# Patient Record
Sex: FEMALE | Race: WHITE | Employment: FULL TIME | URBAN - NONMETROPOLITAN AREA
[De-identification: names, ages, dates, MRNs, and addresses within clinical notes are randomized per-mention and may not be internally consistent; named-entity substitution may affect disease eponyms.]

---

## 2017-11-06 ENCOUNTER — HOSPITAL ENCOUNTER (EMERGENCY)
Age: 22
Discharge: HOME | End: 2017-11-06

## 2017-11-06 VITALS
RESPIRATION RATE: 18 BRPM | DIASTOLIC BLOOD PRESSURE: 79 MMHG | BODY MASS INDEX: 22.68 KG/M2 | SYSTOLIC BLOOD PRESSURE: 122 MMHG | HEIGHT: 63 IN | OXYGEN SATURATION: 97 % | HEART RATE: 85 BPM | WEIGHT: 128 LBS | TEMPERATURE: 97.7 F

## 2017-11-06 DIAGNOSIS — N30.00 ACUTE CYSTITIS WITHOUT HEMATURIA: Primary | ICD-10-CM

## 2017-11-06 LAB
BACTERIA: ABNORMAL /HPF
BILIRUBIN URINE: NEGATIVE
BLOOD, URINE: ABNORMAL
CLARITY: ABNORMAL
COLOR: YELLOW
EPITHELIAL CELLS, UA: 4 /HPF (ref 0–5)
GLUCOSE URINE: NEGATIVE MG/DL
HCG(URINE) PREGNANCY TEST: NEGATIVE
HYALINE CASTS: 3 /HPF (ref 0–8)
KETONES, URINE: NEGATIVE MG/DL
LEUKOCYTE ESTERASE, URINE: ABNORMAL
NITRITE, URINE: NEGATIVE
PH UA: 7.5
PROTEIN UA: NEGATIVE MG/DL
RBC UA: 4 /HPF (ref 0–4)
SPECIFIC GRAVITY UA: 1.01
UROBILINOGEN, URINE: 0.2 E.U./DL
WBC UA: 49 /HPF (ref 0–5)

## 2017-11-06 PROCEDURE — 81001 URINALYSIS AUTO W/SCOPE: CPT

## 2017-11-06 PROCEDURE — 99284 EMERGENCY DEPT VISIT MOD MDM: CPT

## 2017-11-06 PROCEDURE — 99282 EMERGENCY DEPT VISIT SF MDM: CPT | Performed by: NURSE PRACTITIONER

## 2017-11-06 PROCEDURE — 87077 CULTURE AEROBIC IDENTIFY: CPT

## 2017-11-06 PROCEDURE — 81025 URINE PREGNANCY TEST: CPT

## 2017-11-06 PROCEDURE — 87086 URINE CULTURE/COLONY COUNT: CPT

## 2017-11-06 PROCEDURE — 87186 SC STD MICRODIL/AGAR DIL: CPT

## 2017-11-06 RX ORDER — CEPHALEXIN 500 MG/1
500 CAPSULE ORAL 2 TIMES DAILY
Qty: 20 CAPSULE | Refills: 0 | Status: SHIPPED | OUTPATIENT
Start: 2017-11-06 | End: 2017-11-16

## 2017-11-06 ASSESSMENT — ENCOUNTER SYMPTOMS
EYES NEGATIVE: 1
CHEST TIGHTNESS: 0
SHORTNESS OF BREATH: 0
NAUSEA: 0
VOMITING: 1
CONSTIPATION: 0
ABDOMINAL PAIN: 1
COUGH: 0
DIARRHEA: 0
BLOOD IN STOOL: 0

## 2017-11-06 ASSESSMENT — PAIN SCALES - GENERAL: PAINLEVEL_OUTOF10: 8

## 2017-11-07 NOTE — ED PROVIDER NOTES
[address] [facility] [place] EMERGENCY DEPT  eMERGENCY dEPARTMENT eNCOUnter      Pt Name: Jon Soulier  MRN: [id number]  [place] 1995  Date of evaluation: 11/6/2017  Provider: USAMA Fry    CHIEF COMPLAINT       Chief Complaint   Patient presents with    Abdominal Pain     to mid ab x 1 week worsening         HISTORY OF PRESENT ILLNESS  (Location/Symptom, Timing/Onset, Context/Setting, Quality, Duration, Modifying Factors, Severity.)   Jon Soulier is a 25 y.o. female who presents to the emergency department with complaints of lower abdominal pain. Onset 1 week ago with worsening of symptoms over the last 3 days. Patient reports that she started having abdominal pain 1 week ago and vomited one time yesterday. She further reports frequency, urgency and that she may be pregnant. She took a home pregnancy test today that was negative and would like to have a blood test. She denies fever, chills, cough, dysuria, vaginal discharge, odor, itching, or pain, diarrhea, or constipation. Her last menses was 1 week ago, but was not normal for her. HPI    Nursing Notes were reviewed and I agree. REVIEW OF SYSTEMS    (2-9 systems for level 4, 10 or more for level 5)     Review of Systems   Constitutional: Negative for chills, fatigue and fever. HENT: Negative. Eyes: Negative. Respiratory: Negative for cough, chest tightness and shortness of breath. Cardiovascular: Negative. Gastrointestinal: Positive for abdominal pain and vomiting. Negative for blood in stool, constipation, diarrhea and nausea. Abdominal pain below the umbilicus. Vomited one time yesterday. Genitourinary: Positive for frequency and urgency. Negative for decreased urine volume, difficulty urinating, dysuria, flank pain, genital sores, hematuria, menstrual problem, pelvic pain, vaginal bleeding, vaginal discharge and vaginal pain. Musculoskeletal: Negative. Skin: Negative. Neurological: Negative. Psychiatric/Behavioral: Negative. PAST MEDICAL HISTORY     Past Medical History:   Diagnosis Date    ADHD (attention deficit hyperactivity disorder)     Varicella          SURGICAL HISTORY       Past Surgical History:   Procedure Laterality Date    DENTAL SURGERY      patient reports oral surgery 3-4 years ago         CURRENT MEDICATIONS       Discharge Medication List as of 11/6/2017 10:26 PM          ALLERGIES     Review of patient's allergies indicates no known allergies. FAMILY HISTORY       Family History   Problem Relation Age of Onset    High Blood Pressure Maternal Grandmother     Diabetes Maternal Grandmother     COPD Maternal Grandmother     High Blood Pressure Maternal Grandfather     Diabetes Maternal Grandfather           SOCIAL HISTORY       Social History     Social History    Marital status: Single     Spouse name: N/A    Number of children: N/A    Years of education: N/A     Social History Main Topics    Smoking status: Never Smoker    Smokeless tobacco: Never Used    Alcohol use No    Drug use: No    Sexual activity: Yes     Partners: Male     Other Topics Concern    None     Social History Narrative    None       SCREENINGS           PHYSICAL EXAM    (up to 7 for level 4, 8 or more for level 5)     ED Triage Vitals [11/06/17 2054]   BP Temp Temp Source Pulse Resp SpO2 Height Weight   126/71 97.7 °F (36.5 °C) Temporal 98 18 99 % 5' 3\" (1.6 m) 128 lb (58.1 kg)       Physical Exam   Constitutional: She is oriented to person, place, and time. She appears well-developed and well-nourished. HENT:   Head: Normocephalic and atraumatic. Right Ear: External ear normal.   Left Ear: External ear normal.   Eyes: Conjunctivae and EOM are normal. Pupils are equal, round, and reactive to light. Neck: Normal range of motion. Cardiovascular: Normal rate, regular rhythm and normal heart sounds. Pulmonary/Chest: Effort normal and breath sounds normal.   Abdominal: Soft.  Bowel sounds are normal. She exhibits no distension. There is no tenderness. There is no rebound. Musculoskeletal: Normal range of motion. Neurological: She is alert and oriented to person, place, and time. Skin: Skin is warm and dry. Psychiatric: She has a normal mood and affect. Vitals reviewed. DIAGNOSTIC RESULTS     RADIOLOGY:   Non-plain film images such as CT, Ultrasound and MRI are read by the radiologist. [facility] radiographic images are visualized and preliminarily interpreted by No att. providers found with the below findings:      Interpretation per the Radiologist below, if available at the time of this note:    No orders to display       LABS:  Labs Reviewed   URINALYSIS - Abnormal; Notable for the following:        Result Value    Clarity, UA CLOUDY (*)     Blood, Urine MODERATE (*)     Leukocyte Esterase, Urine MODERATE (*)     All other components within normal limits   MICROSCOPIC URINALYSIS - Abnormal; Notable for the following:     WBC, UA 49 (*)     All other components within normal limits   URINE CULTURE   PREGNANCY, URINE       All other labs were within normal range or not returned as of this dictation. RE-ASSESSMENT        EMERGENCY DEPARTMENT COURSE and DIFFERENTIAL DIAGNOSIS/MDM:   Vitals:    Vitals:    11/06/17 2127 11/06/17 2132 11/06/17 2201 11/06/17 2233   BP: 136/81 129/86 120/79 122/79   Pulse: 92 82 88 85   Resp: 17 18 18 18   Temp:    97.7 °F (36.5 °C)   TempSrc:    Oral   SpO2: 99% 99% 99% 97%   Weight:       Height:           MDM  Number of Diagnoses or Management Options  Acute cystitis without hematuria:   Diagnosis management comments: Diagnosis management comments:   25year old female presents to the emergency room with complaints of lower abdominal pain. Onset 1 week ago with worsening 3 days ago, frequency and urgency. Mother reports that the patient has a history of urinary tract infections when she was young.  The patient advises that she thinks she may be

## 2017-11-08 LAB
ORGANISM: ABNORMAL
URINE CULTURE, ROUTINE: ABNORMAL
URINE CULTURE, ROUTINE: ABNORMAL

## 2017-12-01 ENCOUNTER — HOSPITAL ENCOUNTER (EMERGENCY)
Age: 22
Discharge: HOME | End: 2017-12-01

## 2017-12-01 VITALS
RESPIRATION RATE: 20 BRPM | OXYGEN SATURATION: 99 % | DIASTOLIC BLOOD PRESSURE: 74 MMHG | HEART RATE: 112 BPM | TEMPERATURE: 99.2 F | SYSTOLIC BLOOD PRESSURE: 124 MMHG

## 2017-12-01 DIAGNOSIS — J02.9 ACUTE PHARYNGITIS, UNSPECIFIED ETIOLOGY: Primary | ICD-10-CM

## 2017-12-01 LAB
RAPID INFLUENZA  B AGN: NEGATIVE
RAPID INFLUENZA A AGN: NEGATIVE
S PYO AG THROAT QL: NEGATIVE

## 2017-12-01 PROCEDURE — 6360000002 HC RX W HCPCS: Performed by: PHYSICIAN ASSISTANT

## 2017-12-01 PROCEDURE — 99283 EMERGENCY DEPT VISIT LOW MDM: CPT | Performed by: PHYSICIAN ASSISTANT

## 2017-12-01 PROCEDURE — 6370000000 HC RX 637 (ALT 250 FOR IP): Performed by: PHYSICIAN ASSISTANT

## 2017-12-01 PROCEDURE — 87081 CULTURE SCREEN ONLY: CPT

## 2017-12-01 PROCEDURE — 87804 INFLUENZA ASSAY W/OPTIC: CPT

## 2017-12-01 PROCEDURE — 87880 STREP A ASSAY W/OPTIC: CPT

## 2017-12-01 PROCEDURE — 99283 EMERGENCY DEPT VISIT LOW MDM: CPT

## 2017-12-01 RX ORDER — ONDANSETRON 4 MG/1
4 TABLET, ORALLY DISINTEGRATING ORAL EVERY 8 HOURS PRN
Qty: 15 TABLET | Refills: 0 | Status: SHIPPED | OUTPATIENT
Start: 2017-12-01 | End: 2020-09-21

## 2017-12-01 RX ORDER — FLUTICASONE PROPIONATE 50 MCG
1 SPRAY, SUSPENSION (ML) NASAL DAILY
Qty: 1 BOTTLE | Refills: 0 | Status: SHIPPED | OUTPATIENT
Start: 2017-12-01 | End: 2020-09-21

## 2017-12-01 RX ORDER — ONDANSETRON 4 MG/1
4 TABLET, ORALLY DISINTEGRATING ORAL ONCE
Status: COMPLETED | OUTPATIENT
Start: 2017-12-01 | End: 2017-12-01

## 2017-12-01 RX ORDER — AMOXICILLIN 875 MG/1
875 TABLET, COATED ORAL 2 TIMES DAILY
Qty: 20 TABLET | Refills: 0 | Status: SHIPPED | OUTPATIENT
Start: 2017-12-01 | End: 2017-12-11

## 2017-12-01 RX ORDER — IBUPROFEN 200 MG
600 TABLET ORAL ONCE
Status: COMPLETED | OUTPATIENT
Start: 2017-12-01 | End: 2017-12-01

## 2017-12-01 RX ADMIN — IBUPROFEN 600 MG: 200 TABLET, FILM COATED ORAL at 12:40

## 2017-12-01 RX ADMIN — ONDANSETRON 4 MG: 4 TABLET, ORALLY DISINTEGRATING ORAL at 12:38

## 2017-12-01 ASSESSMENT — ENCOUNTER SYMPTOMS
RHINORRHEA: 1
SORE THROAT: 1
SINUS PAIN: 1

## 2017-12-01 ASSESSMENT — PAIN SCALES - GENERAL
PAINLEVEL_OUTOF10: 5
PAINLEVEL_OUTOF10: 5

## 2017-12-01 NOTE — ED PROVIDER NOTES
Social History    Marital status: Single     Spouse name: N/A    Number of children: N/A    Years of education: N/A     Social History Main Topics    Smoking status: Never Smoker    Smokeless tobacco: Never Used    Alcohol use No    Drug use: No    Sexual activity: Yes     Partners: Male     Other Topics Concern    None     Social History Narrative    None       SCREENINGS           PHYSICAL EXAM    (up to 7 for level 4, 8 or more for level 5)     ED Triage Vitals   BP Temp Temp Source Pulse Resp SpO2 Height Weight   12/01/17 1220 12/01/17 1220 12/01/17 1337 12/01/17 1220 12/01/17 1220 12/01/17 1220 -- --   123/75 101 °F (38.3 °C) Oral 127 20 100 %         Physical Exam   Constitutional: She is oriented to person, place, and time. She appears well-developed and well-nourished. No distress. HENT:   Head: Normocephalic and atraumatic. Right Ear: Hearing, tympanic membrane, external ear and ear canal normal.   Left Ear: Hearing, tympanic membrane, external ear and ear canal normal.   Nose: Mucosal edema and rhinorrhea present. Mouth/Throat: Uvula is midline and mucous membranes are normal. No uvula swelling. Oropharyngeal exudate, posterior oropharyngeal edema and posterior oropharyngeal erythema present. No tonsillar abscesses. Cardiovascular: Normal rate, regular rhythm and normal heart sounds. Exam reveals no gallop and no friction rub. No murmur heard. Pulmonary/Chest: Effort normal and breath sounds normal. No respiratory distress. She has no wheezes. She has no rales. Abdominal: Soft. Bowel sounds are normal. She exhibits no distension. There is no tenderness. There is no rebound and no guarding. Neurological: She is alert and oriented to person, place, and time. Skin: Skin is warm and dry. No rash noted. She is not diaphoretic. Psychiatric: She has a normal mood and affect. Nursing note and vitals reviewed.         DIAGNOSTIC RESULTS     RADIOLOGY:   Non-plain film

## 2017-12-01 NOTE — LETTER
[facility] EMERGENCY DEPT  [address]  Phone: [phone]               December 1, 2017    Patient: Shawn Schwarz   YOB: 1995   Date of Visit: 12/1/2017       To Whom It May Concern:    Shawn Schwarz was seen and treated in our emergency department on 12/1/2017. She may return to work Monday 12/4/18.       Sincerely,       ED Provider        Signature:__________________________________

## 2017-12-03 LAB — S PYO THROAT QL CULT: NORMAL

## 2018-03-19 ENCOUNTER — HOSPITAL ENCOUNTER (EMERGENCY)
Facility: HOSPITAL | Age: 23
Discharge: HOME | End: 2018-03-19
Admitting: EMERGENCY MEDICINE

## 2018-03-19 VITALS
WEIGHT: 130 LBS | TEMPERATURE: 97.8 F | SYSTOLIC BLOOD PRESSURE: 113 MMHG | HEIGHT: 63 IN | RESPIRATION RATE: 17 BRPM | OXYGEN SATURATION: 100 % | BODY MASS INDEX: 23.04 KG/M2 | DIASTOLIC BLOOD PRESSURE: 83 MMHG | HEART RATE: 107 BPM

## 2018-03-19 DIAGNOSIS — R11.2 NAUSEA AND VOMITING, INTRACTABILITY OF VOMITING NOT SPECIFIED, UNSPECIFIED VOMITING TYPE: ICD-10-CM

## 2018-03-19 DIAGNOSIS — N39.0 ACUTE UTI: Primary | ICD-10-CM

## 2018-03-19 DIAGNOSIS — J02.0 STREP PHARYNGITIS: ICD-10-CM

## 2018-03-19 LAB
ALBUMIN SERPL-MCNC: 4.6 G/DL (ref 3.5–5)
ALBUMIN/GLOB SERPL: 1.2 G/DL (ref 1.1–2.5)
ALP SERPL-CCNC: 92 U/L (ref 24–120)
ALT SERPL W P-5'-P-CCNC: 22 U/L (ref 0–54)
AMYLASE SERPL-CCNC: 74 U/L (ref 30–110)
ANION GAP SERPL CALCULATED.3IONS-SCNC: 15 MMOL/L (ref 4–13)
AST SERPL-CCNC: 23 U/L (ref 7–45)
B-HCG UR QL: POSITIVE
BACTERIA UR QL AUTO: ABNORMAL /HPF
BASOPHILS # BLD AUTO: 0.08 10*3/MM3 (ref 0–0.2)
BASOPHILS NFR BLD AUTO: 0.3 % (ref 0–2)
BILIRUB SERPL-MCNC: 0.7 MG/DL (ref 0.1–1)
BILIRUB UR QL STRIP: ABNORMAL
BUN BLD-MCNC: 9 MG/DL (ref 5–21)
BUN/CREAT SERPL: 13.4 (ref 7–25)
CALCIUM SPEC-SCNC: 9.5 MG/DL (ref 8.4–10.4)
CHLORIDE SERPL-SCNC: 100 MMOL/L (ref 98–110)
CLARITY UR: ABNORMAL
CO2 SERPL-SCNC: 24 MMOL/L (ref 24–31)
COLOR UR: ABNORMAL
CREAT BLD-MCNC: 0.67 MG/DL (ref 0.5–1.4)
D-LACTATE SERPL-SCNC: 0.7 MMOL/L (ref 0.5–2)
DEPRECATED RDW RBC AUTO: 41.1 FL (ref 40–54)
EOSINOPHIL # BLD AUTO: 0.01 10*3/MM3 (ref 0–0.7)
EOSINOPHIL NFR BLD AUTO: 0 % (ref 0–4)
ERYTHROCYTE [DISTWIDTH] IN BLOOD BY AUTOMATED COUNT: 12.7 % (ref 12–15)
FLUAV AG NPH QL: NEGATIVE
FLUBV AG NPH QL IA: NEGATIVE
GFR SERPL CREATININE-BSD FRML MDRD: 111 ML/MIN/1.73
GLOBULIN UR ELPH-MCNC: 4 GM/DL
GLUCOSE BLD-MCNC: 96 MG/DL (ref 70–100)
GLUCOSE UR STRIP-MCNC: NEGATIVE MG/DL
HCG INTACT+B SERPL-ACNC: 9.8 MIU/ML (ref 0–5)
HCT VFR BLD AUTO: 42.1 % (ref 37–47)
HETEROPH AB SER QL LA: NEGATIVE
HGB BLD-MCNC: 14.2 G/DL (ref 12–16)
HGB UR QL STRIP.AUTO: ABNORMAL
HOLD SPECIMEN: NORMAL
HOLD SPECIMEN: NORMAL
HYALINE CASTS UR QL AUTO: ABNORMAL /LPF
IMM GRANULOCYTES # BLD: 0.11 10*3/MM3 (ref 0–0.03)
IMM GRANULOCYTES NFR BLD: 0.5 % (ref 0–5)
KETONES UR QL STRIP: ABNORMAL
LEUKOCYTE ESTERASE UR QL STRIP.AUTO: ABNORMAL
LIPASE SERPL-CCNC: 29 U/L (ref 23–203)
LYMPHOCYTES # BLD AUTO: 1.27 10*3/MM3 (ref 0.72–4.86)
LYMPHOCYTES NFR BLD AUTO: 5.3 % (ref 15–45)
MCH RBC QN AUTO: 29.8 PG (ref 28–32)
MCHC RBC AUTO-ENTMCNC: 33.7 G/DL (ref 33–36)
MCV RBC AUTO: 88.3 FL (ref 82–98)
MONOCYTES # BLD AUTO: 1.61 10*3/MM3 (ref 0.19–1.3)
MONOCYTES NFR BLD AUTO: 6.7 % (ref 4–12)
MUCOUS THREADS URNS QL MICRO: ABNORMAL /HPF
NEUTROPHILS # BLD AUTO: 20.81 10*3/MM3 (ref 1.87–8.4)
NEUTROPHILS NFR BLD AUTO: 87.2 % (ref 39–78)
NITRITE UR QL STRIP: NEGATIVE
NRBC BLD MANUAL-RTO: 0 /100 WBC (ref 0–0)
PH UR STRIP.AUTO: 6 [PH] (ref 5–8)
PLATELET # BLD AUTO: 301 10*3/MM3 (ref 130–400)
PMV BLD AUTO: 10.9 FL (ref 6–12)
POTASSIUM BLD-SCNC: 3.8 MMOL/L (ref 3.5–5.3)
PROT SERPL-MCNC: 8.6 G/DL (ref 6.3–8.7)
PROT UR QL STRIP: ABNORMAL
RBC # BLD AUTO: 4.77 10*6/MM3 (ref 4.2–5.4)
RBC # UR: ABNORMAL /HPF
REF LAB TEST METHOD: ABNORMAL
S PYO AG THROAT QL: POSITIVE
SODIUM BLD-SCNC: 139 MMOL/L (ref 135–145)
SP GR UR STRIP: >1.03 (ref 1–1.03)
SQUAMOUS #/AREA URNS HPF: ABNORMAL /HPF
UROBILINOGEN UR QL STRIP: ABNORMAL
WBC NRBC COR # BLD: 23.89 10*3/MM3 (ref 4.8–10.8)
WBC UR QL AUTO: ABNORMAL /HPF
WHOLE BLOOD HOLD SPECIMEN: NORMAL
WHOLE BLOOD HOLD SPECIMEN: NORMAL

## 2018-03-19 PROCEDURE — 87804 INFLUENZA ASSAY W/OPTIC: CPT | Performed by: NURSE PRACTITIONER

## 2018-03-19 PROCEDURE — 86308 HETEROPHILE ANTIBODY SCREEN: CPT | Performed by: NURSE PRACTITIONER

## 2018-03-19 PROCEDURE — 87880 STREP A ASSAY W/OPTIC: CPT | Performed by: NURSE PRACTITIONER

## 2018-03-19 PROCEDURE — 84702 CHORIONIC GONADOTROPIN TEST: CPT | Performed by: NURSE PRACTITIONER

## 2018-03-19 PROCEDURE — 85025 COMPLETE CBC W/AUTO DIFF WBC: CPT | Performed by: NURSE PRACTITIONER

## 2018-03-19 PROCEDURE — 80053 COMPREHEN METABOLIC PANEL: CPT | Performed by: NURSE PRACTITIONER

## 2018-03-19 PROCEDURE — 96374 THER/PROPH/DIAG INJ IV PUSH: CPT

## 2018-03-19 PROCEDURE — 87077 CULTURE AEROBIC IDENTIFY: CPT | Performed by: NURSE PRACTITIONER

## 2018-03-19 PROCEDURE — 96361 HYDRATE IV INFUSION ADD-ON: CPT

## 2018-03-19 PROCEDURE — 83605 ASSAY OF LACTIC ACID: CPT | Performed by: NURSE PRACTITIONER

## 2018-03-19 PROCEDURE — 81001 URINALYSIS AUTO W/SCOPE: CPT | Performed by: NURSE PRACTITIONER

## 2018-03-19 PROCEDURE — 82150 ASSAY OF AMYLASE: CPT | Performed by: NURSE PRACTITIONER

## 2018-03-19 PROCEDURE — 96375 TX/PRO/DX INJ NEW DRUG ADDON: CPT

## 2018-03-19 PROCEDURE — 81025 URINE PREGNANCY TEST: CPT | Performed by: NURSE PRACTITIONER

## 2018-03-19 PROCEDURE — 87186 SC STD MICRODIL/AGAR DIL: CPT | Performed by: NURSE PRACTITIONER

## 2018-03-19 PROCEDURE — 83690 ASSAY OF LIPASE: CPT | Performed by: NURSE PRACTITIONER

## 2018-03-19 PROCEDURE — 25010000002 ONDANSETRON PER 1 MG: Performed by: NURSE PRACTITIONER

## 2018-03-19 PROCEDURE — 25010000002 CEFTRIAXONE PER 250 MG: Performed by: NURSE PRACTITIONER

## 2018-03-19 PROCEDURE — 99284 EMERGENCY DEPT VISIT MOD MDM: CPT

## 2018-03-19 PROCEDURE — 87086 URINE CULTURE/COLONY COUNT: CPT | Performed by: NURSE PRACTITIONER

## 2018-03-19 RX ORDER — CEFDINIR 300 MG/1
300 CAPSULE ORAL 2 TIMES DAILY
Qty: 20 CAPSULE | Refills: 0 | Status: SHIPPED | OUTPATIENT
Start: 2018-03-19 | End: 2018-03-29

## 2018-03-19 RX ORDER — ONDANSETRON 2 MG/ML
4 INJECTION INTRAMUSCULAR; INTRAVENOUS ONCE
Status: COMPLETED | OUTPATIENT
Start: 2018-03-19 | End: 2018-03-19

## 2018-03-19 RX ORDER — ONDANSETRON 4 MG/1
4 TABLET, ORALLY DISINTEGRATING ORAL EVERY 6 HOURS PRN
Qty: 10 TABLET | Refills: 0 | Status: SHIPPED | OUTPATIENT
Start: 2018-03-19 | End: 2018-11-30 | Stop reason: HOSPADM

## 2018-03-19 RX ADMIN — CEFTRIAXONE SODIUM 1 G: 1 INJECTION, POWDER, FOR SOLUTION INTRAMUSCULAR; INTRAVENOUS at 14:55

## 2018-03-19 RX ADMIN — ONDANSETRON HYDROCHLORIDE 4 MG: 2 INJECTION INTRAMUSCULAR; INTRAVENOUS at 14:55

## 2018-03-19 RX ADMIN — SODIUM CHLORIDE 1000 ML: 9 INJECTION, SOLUTION INTRAVENOUS at 16:43

## 2018-03-19 RX ADMIN — SODIUM CHLORIDE 1000 ML: 9 INJECTION, SOLUTION INTRAVENOUS at 14:51

## 2018-03-19 NOTE — ED NOTES
Patient reports that last night she started experiencing generalized body aches with shortness of breath. Patient also reports pain to midsternum chest with deep inspiration.      Dominguze Koenig RN  03/19/18 3447

## 2018-03-19 NOTE — ED PROVIDER NOTES
"Subjective   Patient is a 22-year-old white female presents with nausea with vomiting, cough, sore throat that started last night.  She states that she became very dizzy and weak today and had a near syncopal episode after vomiting.  She states that she has had chills with subjective fever as well.  He states last time she vomited was just a few hours ago.  She denies any abdominal pain.        History provided by:  Patient   used: No        Review of Systems   Constitutional: Negative.    HENT:        Patient is a 22-year-old white female presents with nausea with vomiting, cough, sore throat that started last night.  She states that she became very dizzy and weak today and had a near syncopal episode after vomiting.  She states that she has had chills with subjective fever as well.  He states last time she vomited was just a few hours ago.  She denies any abdominal pain.   Eyes: Negative.    Respiratory: Negative.    Cardiovascular: Negative.    Gastrointestinal: Negative.    Endocrine: Negative.    Genitourinary: Negative.    Musculoskeletal: Negative.    Skin: Negative.    Allergic/Immunologic: Negative.    Neurological: Negative.    Hematological: Negative.    Psychiatric/Behavioral: Negative.    All other systems reviewed and are negative.      History reviewed. No pertinent past medical history.    No Known Allergies    Past Surgical History:   Procedure Laterality Date   • DENTAL PROCEDURE         History reviewed. No pertinent family history.    Social History     Social History   • Marital status: Single     Social History Main Topics   • Smoking status: Never Smoker   • Smokeless tobacco: Never Used   • Alcohol use No   • Drug use: No     Other Topics Concern   • Not on file       Prior to Admission medications    Not on File       /83   Pulse 107   Temp 97.8 °F (36.6 °C) (Temporal Artery )   Resp 17   Ht 160 cm (63\")   Wt 59 kg (130 lb)   SpO2 100%   BMI 23.03 kg/m² "     Objective   Physical Exam   Constitutional: She is oriented to person, place, and time. She appears well-developed and well-nourished.   HENT:   Head: Normocephalic and atraumatic.   Moderate tonsillar enlargement noted with eryth. Mild exudate noted. No signs of peritonsillar abscess noted. Airway intact.    Eyes: Conjunctivae and EOM are normal. Pupils are equal, round, and reactive to light.   Neck: Normal range of motion. Neck supple. No tracheal deviation present. No thyromegaly present.   bilat anterior cervical adenopathy noted.    Cardiovascular: Normal rate, regular rhythm, normal heart sounds and intact distal pulses.    Pulmonary/Chest: Effort normal and breath sounds normal. No respiratory distress. She has no wheezes. She has no rales. She exhibits no tenderness.   Abdominal: Soft. Bowel sounds are normal.   Musculoskeletal: Normal range of motion.   Neurological: She is alert and oriented to person, place, and time. She has normal reflexes. No cranial nerve deficit.   Skin: Skin is warm and dry.   Psychiatric: She has a normal mood and affect. Her behavior is normal. Judgment and thought content normal.   Nursing note and vitals reviewed.      Procedures         Lab Results (last 24 hours)     Procedure Component Value Units Date/Time    CBC & Differential [[id number]] Collected:  03/19/18 1450    Specimen:  Blood Updated:  03/19/18 1512    Narrative:       The following orders were created for panel order CBC & Differential.  Procedure                               Abnormality         Status                     ---------                               -----------         ------                     CBC Auto Differential[[id number]]         Abnormal            Final result                 Please view results for these tests on the individual orders.    Comprehensive Metabolic Panel [[id number]]  (Abnormal) Collected:  03/19/18 1450    Specimen:  Blood Updated:  03/19/18 1510     Glucose 96 mg/dL      BUN 9  mg/dL      Creatinine 0.67 mg/dL      Sodium 139 mmol/L      Potassium 3.8 mmol/L      Chloride 100 mmol/L      CO2 24.0 mmol/L      Calcium 9.5 mg/dL      Total Protein 8.6 g/dL      Albumin 4.60 g/dL      ALT (SGPT) 22 U/L      AST (SGOT) 23 U/L      Alkaline Phosphatase 92 U/L      Total Bilirubin 0.7 mg/dL      eGFR Non African Amer 111 mL/min/1.73      Globulin 4.0 gm/dL      A/G Ratio 1.2 g/dL      BUN/Creatinine Ratio 13.4     Anion Gap 15.0 (H) mmol/L     Lipase [[id number]]  (Normal) Collected:  03/19/18 1450    Specimen:  Blood Updated:  03/19/18 1510     Lipase 29 U/L     Amylase [[id number]]  (Normal) Collected:  03/19/18 1450    Specimen:  Blood Updated:  03/19/18 1510     Amylase 74 U/L     Pregnancy, Urine - Urine, Clean Catch [[id number]]  (Abnormal) Collected:  03/19/18 1450    Specimen:  Urine from Urine, Clean Catch Updated:  03/19/18 1523     HCG, Urine QL Positive (A)     Comment: Corrected result. Previous result was Negative on 3/19/2018 at 1507 CDT.       Urinalysis With / Culture If Indicated - Urine, Clean Catch [[id number]]  (Abnormal) Collected:  03/19/18 1450    Specimen:  Urine from Urine, Clean Catch Updated:  03/19/18 1526     Color, UA Dark Yellow (A)     Appearance, UA Cloudy (A)     pH, UA 6.0     Specific Gravity, UA >1.030 (H)     Glucose, UA Negative     Ketones, UA 15 mg/dL (1+) (A)     Bilirubin, UA Small (1+) (A)     Blood, UA Trace (A)     Protein, UA 30 mg/dL (1+) (A)     Leuk Esterase, UA Moderate (2+) (A)     Nitrite, UA Negative     Urobilinogen, UA 2.0 E.U./dL (A)    Rapid Strep A Screen - Swab, Throat [[id number]]  (Abnormal) Collected:  03/19/18 1450    Specimen:  Swab from Throat Updated:  03/19/18 1527     Strep A Ag Positive (A)    Mononucleosis Screen [[id number]]  (Normal) Collected:  03/19/18 1450    Specimen:  Blood Updated:  03/19/18 1509     Monospot Negative    Influenza Antigen, Rapid - Swab, Nasopharynx [[id number]]  (Normal) Collected:  03/19/18 1450    Specimen:  Swab  from Nasopharynx Updated:  03/19/18 1526     Influenza A Ag, EIA Negative     Influenza B Ag, EIA Negative    Narrative:         Recommend confirmation of negative results by viral culture or molecular assay.    CBC Auto Differential [[id number]]  (Abnormal) Collected:  03/19/18 1450    Specimen:  Blood Updated:  03/19/18 1512     WBC 23.89 (H) 10*3/mm3      RBC 4.77 10*6/mm3      Hemoglobin 14.2 g/dL      Hematocrit 42.1 %      MCV 88.3 fL      MCH 29.8 pg      MCHC 33.7 g/dL      RDW 12.7 %      RDW-SD 41.1 fl      MPV 10.9 fL      Platelets 301 10*3/mm3      Neutrophil % 87.2 (H) %      Lymphocyte % 5.3 (L) %      Monocyte % 6.7 %      Eosinophil % 0.0 %      Basophil % 0.3 %      Immature Grans % 0.5 %      Neutrophils, Absolute 20.81 (H) 10*3/mm3      Lymphocytes, Absolute 1.27 10*3/mm3      Monocytes, Absolute 1.61 (H) 10*3/mm3      Eosinophils, Absolute 0.01 10*3/mm3      Basophils, Absolute 0.08 10*3/mm3      Immature Grans, Absolute 0.11 (H) 10*3/mm3      nRBC 0.0 /100 WBC     Urine Culture - Urine, Urine, Clean Catch [[id number]]  (Abnormal) Collected:  03/19/18 1450    Specimen:  Urine from Urine, Clean Catch Updated:  03/20/18 0929     Urine Culture --      >100,000 CFU/mL Gram Negative Bacilli (A)    Urinalysis, Microscopic Only - Urine, Clean Catch [[id number]]  (Abnormal) Collected:  03/19/18 1450    Specimen:  Urine from Urine, Clean Catch Updated:  03/19/18 1526     RBC, UA 3-5 (A) /HPF      WBC, UA 31-50 (A) /HPF      Bacteria, UA 1+ (A) /HPF      Squamous Epithelial Cells, UA 7-12 (A) /HPF      Hyaline Casts, UA None Seen /LPF      Mucus, UA Moderate/2+ (A) /HPF      Methodology Manual Light Microscopy    hCG, Quantitative, Pregnancy [[id number]]  (Abnormal) Collected:  03/19/18 1450    Specimen:  Blood Updated:  03/19/18 1553     HCG Quantitative 9.80 (H) mIU/mL     Lactic Acid, Plasma [[id number]]  (Normal) Collected:  03/19/18 1640    Specimen:  Blood from Arm, Left Updated:  03/19/18 1703     Lactate  0.7 mmol/L           No orders to display       ED Course  ED Course   Comment By Time   Pending lactic acid and infusion of 2nd liter of ivf. Pt states that she is feeling much better at this time. No further vomiting since has been in the er.  Cristal Dickerson, APRN 03/19 1651   Reeval pt - states that she is feeling much better. Reviewed pt and pt care plan with dr nuñez- also assessed pt and in agreement with pt care plan - pt will be discharged home shortly in stable condition to follow up with pcp this week. Advised also she needs to follow up with ob for further. Will be discharged home in stable condition  Cristal Dickerson, APRN 03/19 2646          MDM  Number of Diagnoses or Management Options  Acute UTI: minor  Nausea and vomiting, intractability of vomiting not specified, unspecified vomiting type: minor  Strep pharyngitis: minor  Patient Progress  Patient progress: stable      Final diagnoses:   Acute UTI   Strep pharyngitis   Nausea and vomiting, intractability of vomiting not specified, unspecified vomiting type          Cristal Dickerson, KIANA  03/20/18 1244

## 2018-03-22 LAB
BACTERIA SPEC AEROBE CULT: ABNORMAL
BACTERIA SPEC AEROBE CULT: ABNORMAL

## 2018-03-29 LAB
EXTERNAL HEPATITIS B SURFACE ANTIGEN: NEGATIVE
EXTERNAL HERPES CULTURE: NORMAL
EXTERNAL SYPHILIS RPR SCREEN: NEGATIVE
HIV1 P24 AG SERPL QL IA: NORMAL

## 2018-03-31 LAB — EXTERNAL RUBELLA QUALITATIVE: NORMAL

## 2018-05-17 LAB
EXTERNAL CHLAMYDIA SCREEN: NEGATIVE
EXTERNAL GONORRHEA SCREEN: NEGATIVE

## 2018-09-27 ENCOUNTER — APPOINTMENT (OUTPATIENT)
Dept: LAB | Facility: HOSPITAL | Age: 23
End: 2018-09-27
Attending: OBSTETRICS & GYNECOLOGY

## 2018-09-27 ENCOUNTER — TRANSCRIBE ORDERS (OUTPATIENT)
Dept: ADMINISTRATIVE | Facility: HOSPITAL | Age: 23
End: 2018-09-27

## 2018-09-27 DIAGNOSIS — O09.893 HISTORY OF MATERNAL HEMATOMA OF BROAD LIGAMENT, CURRENTLY PREGNANT, THIRD TRIMESTER: ICD-10-CM

## 2018-09-27 DIAGNOSIS — Z67.91 RHD NEGATIVE: Primary | ICD-10-CM

## 2018-09-27 LAB
ABO GROUP BLD: NORMAL
BLD GP AB SCN SERPL QL: NEGATIVE
NUMBER OF DOSES: NORMAL
RH BLD: NEGATIVE

## 2018-09-27 PROCEDURE — 36415 COLL VENOUS BLD VENIPUNCTURE: CPT | Performed by: OBSTETRICS & GYNECOLOGY

## 2018-09-27 PROCEDURE — 86900 BLOOD TYPING SEROLOGIC ABO: CPT | Performed by: OBSTETRICS & GYNECOLOGY

## 2018-09-27 PROCEDURE — 86850 RBC ANTIBODY SCREEN: CPT | Performed by: OBSTETRICS & GYNECOLOGY

## 2018-09-27 PROCEDURE — 86901 BLOOD TYPING SEROLOGIC RH(D): CPT | Performed by: OBSTETRICS & GYNECOLOGY

## 2018-09-28 ENCOUNTER — HOSPITAL ENCOUNTER (OUTPATIENT)
Facility: HOSPITAL | Age: 23
Discharge: HOME | End: 2018-09-28
Attending: OBSTETRICS & GYNECOLOGY | Admitting: OBSTETRICS & GYNECOLOGY

## 2018-09-28 PROBLEM — Z29.13 NEED FOR RHOGAM DUE TO RH NEGATIVE MOTHER: Status: ACTIVE | Noted: 2018-09-28

## 2018-09-28 PROCEDURE — 96372 THER/PROPH/DIAG INJ SC/IM: CPT

## 2018-09-28 PROCEDURE — 25010000003 RHO D IMMUNE GLOBULIN 1500 UNITS SOLUTION PREFILLED SYRINGE: Performed by: OBSTETRICS & GYNECOLOGY

## 2018-09-28 RX ADMIN — HUMAN RHO(D) IMMUNE GLOBULIN 1500 UNITS: 300 INJECTION, SOLUTION INTRAMUSCULAR at 14:46

## 2018-10-08 NOTE — DISCHARGE INSTRUCTIONS
Antibiotic Medicine, Adult  Antibiotic medicines treat infections caused by a type of germ called bacteria. They work by killing the bacteria that make you sick.  When do I need to take antibiotics?  You often need these medicines to treat bacterial infections, such as:  · A urinary tract infection (UTI).  · Strep throat.  · Meningitis. This affects the spinal cord and brain.  · A bad lung infection.  You may start the medicines while your doctor waits for tests to come back. When the tests come back, your doctor may change or stop your medicine.  When are antibiotics not needed?  You do not need these medicines for most common illnesses, such as:  · A cold.  · The flu.  · A sore throat.  Antibiotics are not always needed for all infections caused by bacteria. Do not ask for these medicines, or take them, when they are not needed.  What are the risks of taking antibiotics?  Most antibiotics can cause an infection called Clostridium difficile.This causes watery poop (diarrhea). Let your doctor know right away if:  · You have watery poop while taking an antibiotic.  · You have watery poop after you stop taking an antibiotic. The illness can happen weeks after you stop the medicine.  You also have a risk of getting an infection in the future that antibiotics cannot treat (antibiotic-resistant infection). This type of infection can be dangerous.  What else should I know about taking antibiotics?  · You need to take the entire prescription.  ¨ Take the medicine for as long as told by your doctor.  ¨ Do not stop taking it even if you start to feel better.  · Try not to miss any doses. If you miss a dose, call your doctor.  · Birth control pills may not work. If you take birth control pills:  ¨ Keep on taking them.  ¨ Use a second form of birth control, such as a condom. Do this for as long as told by your doctor.  · Ask your doctor:  ¨ How long to wait in between doses.  ¨ If you should take the medicine with food.  ¨ If  there is anything you should stay away from while taking the antibiotic, such as:  ¨ Food.  ¨ Drinks.  ¨ Medicines.  ¨ If there are any side effects you should watch for.  · Only take the medicines that your doctor told you to take. Do not take medicines that were given to someone else.  · Drink a large glass of water with the medicine.  · Ask the pharmacist for a tool to measure the medicine, such as:  ¨ A syringe.  ¨ A cup.  ¨ A spoon.  · Throw away any extra medicine.  Contact a doctor if:  · You get worse.  · You have new joint pain or muscle aches after starting the medicine.  · You have side effects from the medicine, such as:  ¨ Stomach pain.  ¨ Watery poop.  ¨ Feeling sick to your stomach (nausea).  Get help right away if:  · You have signs of a very bad allergic reaction. If this happens, stop taking the medicine right away. Signs may include:  ¨ Hives. These are raised, itchy, red bumps on the skin.  ¨ Skin rash.  ¨ Trouble breathing.  ¨ Wheezing.  ¨ Swelling.  ¨ Feeling dizzy.  ¨ Throwing up (vomiting).  · Your pee (urine) is dark, or is the color of blood.  · Your skin turns yellow.  · You bruise easily.  · You bleed easily.  · You have very bad watery poop and cramps in your belly.  · You have a very bad headache.  Summary  · Antibiotics are often used to treat infections caused by bacteria.  · Only take these medicines when needed.  · Let your doctor know if you have watery poop while taking an antibiotic.  · You need to take the entire prescription.  This information is not intended to replace advice given to you by your health care provider. Make sure you discuss any questions you have with your health care provider.  Document Released: 09/26/2009 Document Revised: 12/20/2017 Document Reviewed: 12/20/2017  [employer] Interactive Patient Education © 2017 [employer]      Nausea and Vomiting, Adult  Feeling sick to your stomach (nausea) means that your stomach is upset or you feel like you have to  throw up (vomit). Feeling more and more sick to your stomach can lead to throwing up. Throwing up happens when food and liquid from your stomach are thrown up and out the mouth. Throwing up can make you feel weak and cause you to get dehydrated. Dehydration can make you tired and thirsty, make you have a dry mouth, and make it so you pee (urinate) less often. Older adults and people with other diseases or a weak defense system (immune system) are at higher risk for dehydration. If you feel sick to your stomach or if you throw up, it is important to follow instructions from your doctor about how to take care of yourself.  Follow these instructions at home:  Eating and drinking   Follow these instructions as told by your doctor:  · Take an oral rehydration solution (ORS). This is a drink that is sold at pharmacies and stores.  · Drink clear fluids in small amounts as you are able, such as:  ¨ Water.  ¨ Ice chips.  ¨ Diluted fruit juice.  ¨ Low-calorie sports drinks.  · Eat bland, easy-to-digest foods in small amounts as you are able, such as:  ¨ Bananas.  ¨ Applesauce.  ¨ Rice.  ¨ Low-fat (lean) meats.  ¨ Toast.  ¨ Crackers.  · Avoid fluids that have a lot of sugar or caffeine in them.  · Avoid alcohol.  · Avoid spicy or fatty foods.  General instructions   · Drink enough fluid to keep your pee (urine) clear or pale yellow.  · Wash your hands often. If you cannot use soap and water, use hand .  · Make sure that all people in your home wash their hands well and often.  · Take over-the-counter and prescription medicines only as told by your doctor.  · Rest at home while you get better.  · Watch your condition for any changes.  · Breathe slowly and deeply when you feel sick to your stomach.  · Keep all follow-up visits as told by your doctor. This is important.  Contact a doctor if:  · You have a fever.  · You cannot keep fluids down.  · Your symptoms get worse.  · You have new symptoms.  · You feel sick to your  Name band; stomach for more than two days.  · You feel light-headed or dizzy.  · You have a headache.  · You have muscle cramps.  Get help right away if:  · You have pain in your chest, neck, arm, or jaw.  · You feel very weak or you pass out (faint).  · You throw up again and again.  · You see blood in your throw-up.  · Your throw-up looks like black coffee grounds.  · You have bloody or black poop (stools) or poop that look like tar.  · You have a very bad headache, a stiff neck, or both.  · You have a rash.  · You have very bad pain, cramping, or bloating in your belly (abdomen).  · You have trouble breathing.  · You are breathing very quickly.  · Your heart is beating very quickly.  · Your skin feels cold and clammy.  · You feel confused.  · You have pain when you pee.  · You have signs of dehydration, such as:  ¨ Dark pee, hardly any pee, or no pee.  ¨ Cracked lips.  ¨ Dry mouth.  ¨ Sunken eyes.  ¨ Sleepiness.  ¨ Weakness.  These symptoms may be an emergency. Do not wait to see if the symptoms will go away. Get medical help right away. Call your local emergency services (911 in the [place] Do not drive yourself to the hospital.  This information is not intended to replace advice given to you by your health care provider. Make sure you discuss any questions you have with your health care provider.  Document Released: 06/05/2009 Document Revised: 07/07/2017 Document Reviewed: 08/23/2016  [employer] Interactive Patient Education © 2017 [employer]      Pregnancy and Urinary Tract Infection  What is a urinary tract infection?  A urinary tract infection (UTI) is an infection of any part of the urinary tract. This includes the kidneys, the tubes that connect your kidneys to your bladder (ureters), the bladder, and the tube that carries urine out of your body (urethra). These organs make, store, and get rid of urine in the body. A UTI can be a bladder infection (cystitis) or a kidney infection (pyelonephritis). This infection may  be caused by fungi, viruses, and bacteria. Bacteria are the most common cause of UTIs.  You are more likely to develop a UTI during pregnancy because:  · The physical and hormonal changes your body goes through can make it easier for bacteria to get into your urinary tract.  · Your growing baby puts pressure on your uterus and can affect urine flow.  Does a UTI place my baby at risk?  An untreated UTI during pregnancy could lead to a kidney infection, which can cause health problems that could affect your baby. Possible complications of an untreated UTI include:  · Having your baby before 37 weeks of pregnancy (premature).  · Having a baby with a low birth weight.  · Developing high blood pressure during pregnancy (preeclampsia).  What are the symptoms of a UTI?  Symptoms of a UTI include:  · Fever.  · Frequent urination or passing small amounts of urine frequently.  · Needing to urinate urgently.  · Pain or a burning sensation with urination.  · Urine that smells bad or unusual.  · Cloudy urine.  · Pain in the lower abdomen or back.  · Trouble urinating.  · Blood in the urine.  · Vomiting or being less hungry than normal.  · Diarrhea or abdominal pain.  · Vaginal discharge.  What are the treatment options for a UTI during pregnancy?  Treatment for this condition may include:  · Antibiotic medicines that are safe to take during pregnancy.  · Other medicines to treat less common causes of UTI.  How can I prevent a UTI?     To prevent a UTI:  · Go to the bathroom as soon as you feel the need.  · Always wipe from front to back.  · Wash your genital area with soap and warm water daily.  · Empty your bladder before and after sex.  · Wear cotton underwear.  · Limit your intake of high sugar foods or drinks, such as regular soda, juice, and sweets.  · Drink 6-8 glasses of water daily.  · Do not wear tight-fitting pants.  · Do not douche or use deodorant sprays.  · Do not drink alcohol, caffeine, or carbonated drinks. These  can irritate the bladder.  Contact a health care provider if:  · Your symptoms do not improve or get worse.  · You have a fever after two days of treatment.  · You have a rash.  · You have abnormal vaginal discharge.  · You have back or side pain.  · You have chills.  · You have nausea and vomiting.  Get help right away if:  Seek immediate medical care if you are pregnant and:  · You feel contractions in your uterus.  · You have lower belly pain.  · You have a gush of fluid from your vagina.  · You have blood in your urine.  · You are vomiting and cannot keep down any medicines or water.  This information is not intended to replace advice given to you by your health care provider. Make sure you discuss any questions you have with your health care provider.  Document Released: 04/13/2012 Document Revised: 12/01/2017 Document Reviewed: 11/07/2016  [employer] Interactive Patient Education © 2017 [employer]      Strep Throat  Strep throat is an infection of the throat. It is caused by germs. Strep throat spreads from person to person because of coughing, sneezing, or close contact.  Follow these instructions at home:  Medicines   · Take over-the-counter and prescription medicines only as told by your doctor.  · Take your antibiotic medicine as told by your doctor. Do not stop taking the medicine even if you feel better.  · Have family members who also have a sore throat or fever go to a doctor.  Eating and drinking   · Do not share food, drinking cups, or personal items.  · Try eating soft foods until your sore throat feels better.  · Drink enough fluid to keep your pee (urine) clear or pale yellow.  General instructions   · Rinse your mouth (gargle) with a salt-water mixture 3-4 times per day or as needed. To make a salt-water mixture, stir ½-1 tsp of salt into 1 cup of warm water.  · Make sure that all people in your house wash their hands well.  · Rest.  · Stay home from school or work until you have been taking  antibiotics for 24 hours.  · Keep all follow-up visits as told by your doctor. This is important.  Contact a doctor if:  · Your neck keeps getting bigger.  · You get a rash, cough, or earache.  · You cough up thick liquid that is green, yellow-brown, or bloody.  · You have pain that does not get better with medicine.  · Your problems get worse instead of getting better.  · You have a fever.  Get help right away if:  · You throw up (vomit).  · You get a very bad headache.  · You neck hurts or it feels stiff.  · You have chest pain or you are short of breath.  · You have drooling, very bad throat pain, or changes in your voice.  · Your neck is swollen or the skin gets red and tender.  · Your mouth is dry or you are peeing less than normal.  · You keep feeling more tired or it is hard to wake up.  · Your joints are red or they hurt.  This information is not intended to replace advice given to you by your health care provider. Make sure you discuss any questions you have with your health care provider.  Document Released: 06/05/2009 Document Revised: 08/16/2017 Document Reviewed: 04/11/2016  [employer] Interactive Patient Education © 2017 [employer]

## 2018-10-10 ENCOUNTER — HOSPITAL ENCOUNTER (INPATIENT)
Facility: HOSPITAL | Age: 23
LOS: 1 days | Discharge: HOME | End: 2018-10-11
Attending: EMERGENCY MEDICINE | Admitting: OBSTETRICS & GYNECOLOGY

## 2018-10-10 ENCOUNTER — APPOINTMENT (OUTPATIENT)
Dept: ULTRASOUND IMAGING | Facility: HOSPITAL | Age: 23
End: 2018-10-10
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
## 2018-10-10 DIAGNOSIS — R10.30 LOWER ABDOMINAL PAIN: ICD-10-CM

## 2018-10-10 DIAGNOSIS — Z3A.32 32 WEEKS GESTATION OF PREGNANCY: ICD-10-CM

## 2018-10-10 LAB
ABO GROUP BLD: NORMAL
BASOPHILS # BLD MANUAL: 0.16 10*3/MM3 (ref 0–0.2)
BASOPHILS NFR BLD AUTO: 1 % (ref 0–2)
BILIRUB UR QL STRIP: NEGATIVE
BLD GP AB SCN SERPL QL: POSITIVE
CLARITY UR: CLEAR
COLOR UR: YELLOW
DEPRECATED RDW RBC AUTO: 41.3 FL (ref 40–54)
ERYTHROCYTE [DISTWIDTH] IN BLOOD BY AUTOMATED COUNT: 12.5 % (ref 12–15)
FIBRONECTIN FETAL VAG QL: POSITIVE
GLUCOSE UR STRIP-MCNC: NEGATIVE MG/DL
HCT VFR BLD AUTO: 34.7 % (ref 37–47)
HGB BLD-MCNC: 11.8 G/DL (ref 12–16)
HGB UR QL STRIP.AUTO: NEGATIVE
KETONES UR QL STRIP: NEGATIVE
LEUKOCYTE ESTERASE UR QL STRIP.AUTO: NEGATIVE
LYMPHOCYTES # BLD MANUAL: 1.61 10*3/MM3 (ref 0.72–4.86)
LYMPHOCYTES NFR BLD MANUAL: 10.1 % (ref 15–45)
LYMPHOCYTES NFR BLD MANUAL: 6.1 % (ref 4–12)
MCH RBC QN AUTO: 31.2 PG (ref 28–32)
MCHC RBC AUTO-ENTMCNC: 34 G/DL (ref 33–36)
MCV RBC AUTO: 91.8 FL (ref 82–98)
MONOCYTES # BLD AUTO: 0.97 10*3/MM3 (ref 0.19–1.3)
NEUTROPHILS # BLD AUTO: 12.88 10*3/MM3 (ref 1.87–8.4)
NEUTROPHILS NFR BLD MANUAL: 80.8 % (ref 39–78)
NITRITE UR QL STRIP: NEGATIVE
PH UR STRIP.AUTO: 7 [PH] (ref 5–8)
PLAT MORPH BLD: NORMAL
PLATELET # BLD AUTO: 263 10*3/MM3 (ref 130–400)
PMV BLD AUTO: 10.5 FL (ref 6–12)
POLYCHROMASIA BLD QL SMEAR: ABNORMAL
PROT UR QL STRIP: NEGATIVE
RBC # BLD AUTO: 3.78 10*6/MM3 (ref 4.2–5.4)
RESIDUAL RHIG DETECTED: NORMAL
RH BLD: NEGATIVE
SP GR UR STRIP: 1.02 (ref 1–1.03)
T&S EXPIRATION DATE: NORMAL
UROBILINOGEN UR QL STRIP: NORMAL
VARIANT LYMPHS NFR BLD MANUAL: 2 % (ref 0–5)
WBC MORPH BLD: NORMAL
WBC NRBC COR # BLD: 15.94 10*3/MM3 (ref 4.8–10.8)

## 2018-10-10 PROCEDURE — 87086 URINE CULTURE/COLONY COUNT: CPT | Performed by: OBSTETRICS & GYNECOLOGY

## 2018-10-10 PROCEDURE — 82731 ASSAY OF FETAL FIBRONECTIN: CPT | Performed by: OBSTETRICS & GYNECOLOGY

## 2018-10-10 PROCEDURE — 86901 BLOOD TYPING SEROLOGIC RH(D): CPT | Performed by: OBSTETRICS & GYNECOLOGY

## 2018-10-10 PROCEDURE — 85007 BL SMEAR W/DIFF WBC COUNT: CPT | Performed by: OBSTETRICS & GYNECOLOGY

## 2018-10-10 PROCEDURE — 86900 BLOOD TYPING SEROLOGIC ABO: CPT | Performed by: OBSTETRICS & GYNECOLOGY

## 2018-10-10 PROCEDURE — 25010000002 PENICILLIN G POTASSIUM PER 600000 UNITS: Performed by: OBSTETRICS & GYNECOLOGY

## 2018-10-10 PROCEDURE — 99284 EMERGENCY DEPT VISIT MOD MDM: CPT

## 2018-10-10 PROCEDURE — 86870 RBC ANTIBODY IDENTIFICATION: CPT | Performed by: OBSTETRICS & GYNECOLOGY

## 2018-10-10 PROCEDURE — 76815 OB US LIMITED FETUS(S): CPT

## 2018-10-10 PROCEDURE — 86850 RBC ANTIBODY SCREEN: CPT | Performed by: OBSTETRICS & GYNECOLOGY

## 2018-10-10 PROCEDURE — 81003 URINALYSIS AUTO W/O SCOPE: CPT | Performed by: OBSTETRICS & GYNECOLOGY

## 2018-10-10 PROCEDURE — 0T9B70Z DRAINAGE OF BLADDER WITH DRAINAGE DEVICE, VIA NATURAL OR ARTIFICIAL OPENING: ICD-10-PCS | Performed by: OBSTETRICS & GYNECOLOGY

## 2018-10-10 PROCEDURE — 25010000002 BETAMETHASONE ACET & SOD PHOS PER 4 MG: Performed by: OBSTETRICS & GYNECOLOGY

## 2018-10-10 PROCEDURE — G0378 HOSPITAL OBSERVATION PER HR: HCPCS

## 2018-10-10 PROCEDURE — 85027 COMPLETE CBC AUTOMATED: CPT | Performed by: OBSTETRICS & GYNECOLOGY

## 2018-10-10 RX ORDER — PENICILLIN G POTASSIUM 5000000 [IU]/1
3 INJECTION, POWDER, FOR SOLUTION INTRAMUSCULAR; INTRAVENOUS EVERY 4 HOURS
Status: DISCONTINUED | OUTPATIENT
Start: 2018-10-10 | End: 2018-10-10 | Stop reason: SDUPTHER

## 2018-10-10 RX ORDER — MAGNESIUM SULFATE HEPTAHYDRATE 40 MG/ML
4 INJECTION, SOLUTION INTRAVENOUS ONCE
Status: DISCONTINUED | OUTPATIENT
Start: 2018-10-10 | End: 2018-10-10

## 2018-10-10 RX ORDER — FERROUS SULFATE 325(65) MG
325 TABLET ORAL
COMMUNITY
End: 2020-07-31

## 2018-10-10 RX ORDER — BETAMETHASONE SODIUM PHOSPHATE AND BETAMETHASONE ACETATE 3; 3 MG/ML; MG/ML
12 INJECTION, SUSPENSION INTRA-ARTICULAR; INTRALESIONAL; INTRAMUSCULAR; SOFT TISSUE EVERY 24 HOURS
Status: COMPLETED | OUTPATIENT
Start: 2018-10-10 | End: 2018-10-11

## 2018-10-10 RX ORDER — SODIUM CHLORIDE 0.9 % (FLUSH) 0.9 %
5 SYRINGE (ML) INJECTION AS NEEDED
Status: DISCONTINUED | OUTPATIENT
Start: 2018-10-10 | End: 2018-10-11 | Stop reason: HOSPADM

## 2018-10-10 RX ORDER — ACETAMINOPHEN 325 MG/1
650 TABLET ORAL EVERY 6 HOURS PRN
Status: DISCONTINUED | OUTPATIENT
Start: 2018-10-10 | End: 2018-10-11 | Stop reason: HOSPADM

## 2018-10-10 RX ORDER — PRENATAL VIT NO.126/IRON/FOLIC 28MG-0.8MG
TABLET ORAL DAILY
COMMUNITY
End: 2020-07-31

## 2018-10-10 RX ORDER — PENICILLIN G 3000000 [IU]/50ML
3 INJECTION, SOLUTION INTRAVENOUS EVERY 4 HOURS
Status: DISCONTINUED | OUTPATIENT
Start: 2018-10-10 | End: 2018-10-11 | Stop reason: HOSPADM

## 2018-10-10 RX ORDER — SODIUM CHLORIDE 0.9 % (FLUSH) 0.9 %
3 SYRINGE (ML) INJECTION EVERY 12 HOURS SCHEDULED
Status: DISCONTINUED | OUTPATIENT
Start: 2018-10-10 | End: 2018-10-11 | Stop reason: HOSPADM

## 2018-10-10 RX ORDER — MAGNESIUM SULFATE HEPTAHYDRATE 40 MG/ML
4 INJECTION, SOLUTION INTRAVENOUS ONCE
Status: COMPLETED | OUTPATIENT
Start: 2018-10-10 | End: 2018-10-11

## 2018-10-10 RX ORDER — SODIUM CHLORIDE, SODIUM LACTATE, POTASSIUM CHLORIDE, CALCIUM CHLORIDE 600; 310; 30; 20 MG/100ML; MG/100ML; MG/100ML; MG/100ML
75 INJECTION, SOLUTION INTRAVENOUS CONTINUOUS
Status: DISCONTINUED | OUTPATIENT
Start: 2018-10-10 | End: 2018-10-11

## 2018-10-10 RX ORDER — MAGNESIUM SULFATE HEPTAHYDRATE 40 MG/ML
2 INJECTION, SOLUTION INTRAVENOUS CONTINUOUS
Status: DISPENSED | OUTPATIENT
Start: 2018-10-10 | End: 2018-10-11

## 2018-10-10 RX ORDER — CALCIUM GLUCONATE 94 MG/ML
1 INJECTION, SOLUTION INTRAVENOUS ONCE AS NEEDED
Status: DISCONTINUED | OUTPATIENT
Start: 2018-10-10 | End: 2018-10-11 | Stop reason: HOSPADM

## 2018-10-10 RX ORDER — NITROFURANTOIN 25; 75 MG/1; MG/1
100 CAPSULE ORAL 2 TIMES DAILY
COMMUNITY
End: 2018-11-30 | Stop reason: HOSPADM

## 2018-10-10 RX ADMIN — MAGNESIUM SULFATE HEPTAHYDRATE 2 G/HR: 40 INJECTION, SOLUTION INTRAVENOUS at 23:56

## 2018-10-10 RX ADMIN — SODIUM CHLORIDE 5 MILLION UNITS: 900 INJECTION INTRAVENOUS at 15:50

## 2018-10-10 RX ADMIN — SODIUM CHLORIDE, POTASSIUM CHLORIDE, SODIUM LACTATE AND CALCIUM CHLORIDE 1000 ML: 600; 310; 30; 20 INJECTION, SOLUTION INTRAVENOUS at 13:04

## 2018-10-10 RX ADMIN — SODIUM CHLORIDE, POTASSIUM CHLORIDE, SODIUM LACTATE AND CALCIUM CHLORIDE 75 ML/HR: 600; 310; 30; 20 INJECTION, SOLUTION INTRAVENOUS at 15:28

## 2018-10-10 RX ADMIN — BETAMETHASONE SODIUM PHOSPHATE AND BETAMETHASONE ACETATE 12 MG: 3; 3 INJECTION, SUSPENSION INTRA-ARTICULAR; INTRALESIONAL; INTRAMUSCULAR at 15:02

## 2018-10-10 RX ADMIN — PENICILLIN G 3 MILLION UNITS: 3000000 INJECTION, SOLUTION INTRAVENOUS at 23:58

## 2018-10-10 RX ADMIN — PENICILLIN G 3 MILLION UNITS: 3000000 INJECTION, SOLUTION INTRAVENOUS at 19:52

## 2018-10-10 RX ADMIN — MAGNESIUM SULFATE HEPTAHYDRATE 4 G: 40 INJECTION, SOLUTION INTRAVENOUS at 15:28

## 2018-10-10 RX ADMIN — MAGNESIUM SULFATE HEPTAHYDRATE 2 G/HR: 40 INJECTION, SOLUTION INTRAVENOUS at 15:47

## 2018-10-11 VITALS
HEIGHT: 63 IN | WEIGHT: 135 LBS | TEMPERATURE: 97.1 F | HEART RATE: 109 BPM | OXYGEN SATURATION: 99 % | BODY MASS INDEX: 23.92 KG/M2 | DIASTOLIC BLOOD PRESSURE: 75 MMHG | RESPIRATION RATE: 16 BRPM | SYSTOLIC BLOOD PRESSURE: 126 MMHG

## 2018-10-11 PROBLEM — Z29.13 NEED FOR RHOGAM DUE TO RH NEGATIVE MOTHER: Status: RESOLVED | Noted: 2018-09-28 | Resolved: 2018-10-11
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
## 2018-10-11 PROCEDURE — 25010000002 BETAMETHASONE ACET & SOD PHOS PER 4 MG: Performed by: OBSTETRICS & GYNECOLOGY

## 2018-10-11 PROCEDURE — 25010000002 PENICILLIN G POTASSIUM PER 600000 UNITS: Performed by: OBSTETRICS & GYNECOLOGY

## 2018-10-11 PROCEDURE — 25810000003 DEXTROSE-NACL PER 500 ML: Performed by: OBSTETRICS & GYNECOLOGY

## 2018-10-11 PROCEDURE — 51702 INSERT TEMP BLADDER CATH: CPT

## 2018-10-11 PROCEDURE — G0378 HOSPITAL OBSERVATION PER HR: HCPCS

## 2018-10-11 RX ORDER — DEXTROSE AND SODIUM CHLORIDE 5; .9 G/100ML; G/100ML
75 INJECTION, SOLUTION INTRAVENOUS CONTINUOUS
Status: DISCONTINUED | OUTPATIENT
Start: 2018-10-11 | End: 2018-10-11 | Stop reason: HOSPADM

## 2018-10-11 RX ADMIN — DEXTROSE AND SODIUM CHLORIDE 75 ML/HR: 5; 900 INJECTION, SOLUTION INTRAVENOUS at 09:25

## 2018-10-11 RX ADMIN — PENICILLIN G 3 MILLION UNITS: 3000000 INJECTION, SOLUTION INTRAVENOUS at 12:50

## 2018-10-11 RX ADMIN — PENICILLIN G 3 MILLION UNITS: 3000000 INJECTION, SOLUTION INTRAVENOUS at 04:46

## 2018-10-11 RX ADMIN — SODIUM CHLORIDE, POTASSIUM CHLORIDE, SODIUM LACTATE AND CALCIUM CHLORIDE 75 ML/HR: 600; 310; 30; 20 INJECTION, SOLUTION INTRAVENOUS at 06:41

## 2018-10-11 RX ADMIN — PENICILLIN G 3 MILLION UNITS: 3000000 INJECTION, SOLUTION INTRAVENOUS at 08:40

## 2018-10-11 RX ADMIN — MAGNESIUM SULFATE HEPTAHYDRATE 2 G/HR: 40 INJECTION, SOLUTION INTRAVENOUS at 09:50

## 2018-10-11 RX ADMIN — BETAMETHASONE SODIUM PHOSPHATE AND BETAMETHASONE ACETATE 12 MG: 3; 3 INJECTION, SUSPENSION INTRA-ARTICULAR; INTRALESIONAL; INTRAMUSCULAR at 15:30

## 2018-10-12 LAB — BACTERIA SPEC AEROBE CULT: NORMAL
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
## 2018-11-09 LAB — EXTERNAL GROUP B STREP ANTIGEN: NEGATIVE

## 2018-11-16 ENCOUNTER — HOSPITAL ENCOUNTER (OUTPATIENT)
Facility: HOSPITAL | Age: 23
Setting detail: OBSERVATION
Discharge: HOME | End: 2018-11-17
Attending: OBSTETRICS & GYNECOLOGY | Admitting: OBSTETRICS & GYNECOLOGY

## 2018-11-16 ENCOUNTER — APPOINTMENT (OUTPATIENT)
Dept: ULTRASOUND IMAGING | Facility: HOSPITAL | Age: 23
End: 2018-11-16

## 2018-11-16 PROBLEM — Z34.90 PREGNANCY: Status: ACTIVE | Noted: 2018-11-16

## 2018-11-16 LAB
ABO GROUP BLD: NORMAL
ALBUMIN SERPL-MCNC: 3.3 G/DL (ref 3.5–5)
ALBUMIN/GLOB SERPL: 1 G/DL (ref 1.1–2.5)
ALP SERPL-CCNC: 199 U/L (ref 24–120)
ALT SERPL W P-5'-P-CCNC: <15 U/L (ref 0–54)
ANION GAP SERPL CALCULATED.3IONS-SCNC: 8 MMOL/L (ref 4–13)
ARTERIAL PATENCY WRIST A: POSITIVE
AST SERPL-CCNC: 27 U/L (ref 7–45)
ATMOSPHERIC PRESS: 754 MMHG
BASE EXCESS BLDA CALC-SCNC: -1 MMOL/L (ref 0–2)
BDY SITE: ABNORMAL
BILIRUB SERPL-MCNC: 0.3 MG/DL (ref 0.1–1)
BLD GP AB SCN SERPL QL: POSITIVE
BODY TEMPERATURE: 37 C
BUN BLD-MCNC: 14 MG/DL (ref 5–21)
BUN/CREAT SERPL: 24.1 (ref 7–25)
CALCIUM SPEC-SCNC: 9.1 MG/DL (ref 8.4–10.4)
CHLORIDE SERPL-SCNC: 106 MMOL/L (ref 98–110)
CO2 SERPL-SCNC: 24 MMOL/L (ref 24–31)
CREAT BLD-MCNC: 0.58 MG/DL (ref 0.5–1.4)
DEPRECATED RDW RBC AUTO: 42.7 FL (ref 40–54)
ERYTHROCYTE [DISTWIDTH] IN BLOOD BY AUTOMATED COUNT: 13 % (ref 12–15)
GFR SERPL CREATININE-BSD FRML MDRD: 130 ML/MIN/1.73
GLOBULIN UR ELPH-MCNC: 3.3 GM/DL
GLUCOSE BLD-MCNC: 82 MG/DL (ref 70–100)
HCO3 BLDA-SCNC: 22.5 MMOL/L (ref 20–26)
HCT VFR BLD AUTO: 37.1 % (ref 37–47)
HGB BLD-MCNC: 12.7 G/DL (ref 12–16)
Lab: ABNORMAL
MCH RBC QN AUTO: 30.9 PG (ref 28–32)
MCHC RBC AUTO-ENTMCNC: 34.2 G/DL (ref 33–36)
MCV RBC AUTO: 90.3 FL (ref 82–98)
MODALITY: ABNORMAL
NT-PROBNP SERPL-MCNC: 56.5 PG/ML (ref 0–450)
PCO2 BLDA: 33 MM HG (ref 35–45)
PH BLDA: 7.44 PH UNITS (ref 7.35–7.45)
PLATELET # BLD AUTO: 245 10*3/MM3 (ref 130–400)
PMV BLD AUTO: 11.6 FL (ref 6–12)
PO2 BLDA: 98 MM HG (ref 83–108)
POTASSIUM BLD-SCNC: 4.5 MMOL/L (ref 3.5–5.3)
PROT SERPL-MCNC: 6.6 G/DL (ref 6.3–8.7)
RBC # BLD AUTO: 4.11 10*6/MM3 (ref 4.2–5.4)
RESIDUAL RHIG DETECTED: NORMAL
RH BLD: NEGATIVE
SAO2 % BLDCOA: 97.9 % (ref 94–99)
SODIUM BLD-SCNC: 138 MMOL/L (ref 135–145)
T&S EXPIRATION DATE: NORMAL
TROPONIN I SERPL-MCNC: <0.012 NG/ML (ref 0–0.03)
VENTILATOR MODE: ABNORMAL
WBC NRBC COR # BLD: 17.16 10*3/MM3 (ref 4.8–10.8)

## 2018-11-16 PROCEDURE — 96361 HYDRATE IV INFUSION ADD-ON: CPT

## 2018-11-16 PROCEDURE — G0378 HOSPITAL OBSERVATION PER HR: HCPCS

## 2018-11-16 PROCEDURE — 96360 HYDRATION IV INFUSION INIT: CPT

## 2018-11-16 PROCEDURE — 86870 RBC ANTIBODY IDENTIFICATION: CPT | Performed by: OBSTETRICS & GYNECOLOGY

## 2018-11-16 PROCEDURE — 76815 OB US LIMITED FETUS(S): CPT

## 2018-11-16 PROCEDURE — 84484 ASSAY OF TROPONIN QUANT: CPT | Performed by: HOSPITALIST

## 2018-11-16 PROCEDURE — 86850 RBC ANTIBODY SCREEN: CPT | Performed by: OBSTETRICS & GYNECOLOGY

## 2018-11-16 PROCEDURE — 94799 UNLISTED PULMONARY SVC/PX: CPT

## 2018-11-16 PROCEDURE — 82803 BLOOD GASES ANY COMBINATION: CPT

## 2018-11-16 PROCEDURE — 93010 ELECTROCARDIOGRAM REPORT: CPT | Performed by: INTERNAL MEDICINE

## 2018-11-16 PROCEDURE — 80053 COMPREHEN METABOLIC PANEL: CPT | Performed by: HOSPITALIST

## 2018-11-16 PROCEDURE — 93005 ELECTROCARDIOGRAM TRACING: CPT | Performed by: HOSPITALIST

## 2018-11-16 PROCEDURE — 86900 BLOOD TYPING SEROLOGIC ABO: CPT | Performed by: OBSTETRICS & GYNECOLOGY

## 2018-11-16 PROCEDURE — 36600 WITHDRAWAL OF ARTERIAL BLOOD: CPT

## 2018-11-16 PROCEDURE — 86901 BLOOD TYPING SEROLOGIC RH(D): CPT | Performed by: OBSTETRICS & GYNECOLOGY

## 2018-11-16 PROCEDURE — 83880 ASSAY OF NATRIURETIC PEPTIDE: CPT | Performed by: HOSPITALIST

## 2018-11-16 PROCEDURE — 85027 COMPLETE CBC AUTOMATED: CPT | Performed by: OBSTETRICS & GYNECOLOGY

## 2018-11-16 RX ORDER — SODIUM CHLORIDE 0.9 % (FLUSH) 0.9 %
3 SYRINGE (ML) INJECTION EVERY 12 HOURS SCHEDULED
Status: DISCONTINUED | OUTPATIENT
Start: 2018-11-16 | End: 2018-11-17 | Stop reason: HOSPADM

## 2018-11-16 RX ORDER — SODIUM CHLORIDE 0.9 % (FLUSH) 0.9 %
5 SYRINGE (ML) INJECTION AS NEEDED
Status: DISCONTINUED | OUTPATIENT
Start: 2018-11-16 | End: 2018-11-17 | Stop reason: HOSPADM

## 2018-11-16 RX ORDER — SODIUM CHLORIDE, SODIUM LACTATE, POTASSIUM CHLORIDE, CALCIUM CHLORIDE 600; 310; 30; 20 MG/100ML; MG/100ML; MG/100ML; MG/100ML
125 INJECTION, SOLUTION INTRAVENOUS CONTINUOUS
Status: DISCONTINUED | OUTPATIENT
Start: 2018-11-16 | End: 2018-11-17 | Stop reason: HOSPADM

## 2018-11-16 RX ORDER — AZITHROMYCIN 250 MG/1
250 TABLET, FILM COATED ORAL DAILY
COMMUNITY
End: 2018-11-30 | Stop reason: HOSPADM

## 2018-11-16 RX ORDER — SODIUM CHLORIDE, SODIUM LACTATE, POTASSIUM CHLORIDE, CALCIUM CHLORIDE 600; 310; 30; 20 MG/100ML; MG/100ML; MG/100ML; MG/100ML
999 INJECTION, SOLUTION INTRAVENOUS ONCE
Status: COMPLETED | OUTPATIENT
Start: 2018-11-16 | End: 2018-11-16

## 2018-11-16 RX ADMIN — SODIUM CHLORIDE, POTASSIUM CHLORIDE, SODIUM LACTATE AND CALCIUM CHLORIDE 999 ML/HR: 600; 310; 30; 20 INJECTION, SOLUTION INTRAVENOUS at 21:30

## 2018-11-16 RX ADMIN — SODIUM CHLORIDE, POTASSIUM CHLORIDE, SODIUM LACTATE AND CALCIUM CHLORIDE 125 ML/HR: 600; 310; 30; 20 INJECTION, SOLUTION INTRAVENOUS at 22:08

## 2018-11-17 ENCOUNTER — APPOINTMENT (OUTPATIENT)
Dept: CARDIOLOGY | Facility: HOSPITAL | Age: 23
End: 2018-11-17

## 2018-11-17 VITALS
RESPIRATION RATE: 18 BRPM | DIASTOLIC BLOOD PRESSURE: 65 MMHG | TEMPERATURE: 98.1 F | HEART RATE: 92 BPM | SYSTOLIC BLOOD PRESSURE: 109 MMHG | OXYGEN SATURATION: 97 %

## 2018-11-17 LAB
AMPHET+METHAMPHET UR QL: NEGATIVE
BARBITURATES UR QL SCN: NEGATIVE
BENZODIAZ UR QL SCN: NEGATIVE
BH CV ECHO MEAS - AO MAX PG (FULL): 3.1 MMHG
BH CV ECHO MEAS - AO MAX PG: 9.2 MMHG
BH CV ECHO MEAS - AO MEAN PG (FULL): 1 MMHG
BH CV ECHO MEAS - AO MEAN PG: 5 MMHG
BH CV ECHO MEAS - AO ROOT AREA (BSA CORRECTED): 1.4
BH CV ECHO MEAS - AO ROOT AREA: 4.2 CM^2
BH CV ECHO MEAS - AO ROOT DIAM: 2.3 CM
BH CV ECHO MEAS - AO V2 MAX: 152 CM/SEC
BH CV ECHO MEAS - AO V2 MEAN: 106 CM/SEC
BH CV ECHO MEAS - AO V2 VTI: 33.8 CM
BH CV ECHO MEAS - AVA(I,A): 2.7 CM^2
BH CV ECHO MEAS - AVA(I,D): 2.7 CM^2
BH CV ECHO MEAS - AVA(V,A): 2.6 CM^2
BH CV ECHO MEAS - AVA(V,D): 2.6 CM^2
BH CV ECHO MEAS - BSA(HAYCOCK): 1.7 M^2
BH CV ECHO MEAS - BSA: 1.6 M^2
BH CV ECHO MEAS - BZI_BMI: 23.9 KILOGRAMS/M^2
BH CV ECHO MEAS - BZI_METRIC_HEIGHT: 160 CM
BH CV ECHO MEAS - BZI_METRIC_WEIGHT: 61.2 KG
BH CV ECHO MEAS - EDV(CUBED): 110.6 ML
BH CV ECHO MEAS - EDV(MOD-SP4): 93.7 ML
BH CV ECHO MEAS - EDV(TEICH): 107.5 ML
BH CV ECHO MEAS - EF(CUBED): 73.1 %
BH CV ECHO MEAS - EF(MOD-SP4): 64 %
BH CV ECHO MEAS - EF(TEICH): 64.7 %
BH CV ECHO MEAS - ESV(CUBED): 29.8 ML
BH CV ECHO MEAS - ESV(MOD-SP4): 33.7 ML
BH CV ECHO MEAS - ESV(TEICH): 37.9 ML
BH CV ECHO MEAS - FS: 35.4 %
BH CV ECHO MEAS - IVS/LVPW: 1.5
BH CV ECHO MEAS - IVSD: 0.9 CM
BH CV ECHO MEAS - LA DIMENSION: 2.8 CM
BH CV ECHO MEAS - LA/AO: 1.2
BH CV ECHO MEAS - LAT PEAK E' VEL: 5 CM/SEC
BH CV ECHO MEAS - LV DIASTOLIC VOL/BSA (35-75): 57.3 ML/M^2
BH CV ECHO MEAS - LV MASS(C)D: 116.6 GRAMS
BH CV ECHO MEAS - LV MASS(C)DI: 71.3 GRAMS/M^2
BH CV ECHO MEAS - LV MAX PG: 6.2 MMHG
BH CV ECHO MEAS - LV MEAN PG: 4 MMHG
BH CV ECHO MEAS - LV SYSTOLIC VOL/BSA (12-30): 20.6 ML/M^2
BH CV ECHO MEAS - LV V1 MAX: 124 CM/SEC
BH CV ECHO MEAS - LV V1 MEAN: 88.3 CM/SEC
BH CV ECHO MEAS - LV V1 VTI: 29.5 CM
BH CV ECHO MEAS - LVIDD: 4.8 CM
BH CV ECHO MEAS - LVIDS: 3.1 CM
BH CV ECHO MEAS - LVLD AP4: 7.9 CM
BH CV ECHO MEAS - LVLS AP4: 6.2 CM
BH CV ECHO MEAS - LVOT AREA (M): 3.1 CM^2
BH CV ECHO MEAS - LVOT AREA: 3.1 CM^2
BH CV ECHO MEAS - LVOT DIAM: 2 CM
BH CV ECHO MEAS - LVPWD: 0.6 CM
BH CV ECHO MEAS - MED PEAK E' VEL: 7.7 CM/SEC
BH CV ECHO MEAS - MV A MAX VEL: 34.9 CM/SEC
BH CV ECHO MEAS - MV DEC TIME: 0.24 SEC
BH CV ECHO MEAS - MV E MAX VEL: 87.8 CM/SEC
BH CV ECHO MEAS - MV E/A: 2.5
BH CV ECHO MEAS - RAP SYSTOLE: 5 MMHG
BH CV ECHO MEAS - RVSP: 24.9 MMHG
BH CV ECHO MEAS - SI(AO): 85.8 ML/M^2
BH CV ECHO MEAS - SI(CUBED): 49.4 ML/M^2
BH CV ECHO MEAS - SI(LVOT): 56.6 ML/M^2
BH CV ECHO MEAS - SI(MOD-SP4): 36.7 ML/M^2
BH CV ECHO MEAS - SI(TEICH): 42.5 ML/M^2
BH CV ECHO MEAS - SV(AO): 140.4 ML
BH CV ECHO MEAS - SV(CUBED): 80.8 ML
BH CV ECHO MEAS - SV(LVOT): 92.7 ML
BH CV ECHO MEAS - SV(MOD-SP4): 60 ML
BH CV ECHO MEAS - SV(TEICH): 69.6 ML
BH CV ECHO MEAS - TR MAX VEL: 223 CM/SEC
BH CV ECHO MEASUREMENTS AVERAGE E/E' RATIO: 13.83
CANNABINOIDS SERPL QL: NEGATIVE
COCAINE UR QL: NEGATIVE
LEFT ATRIUM VOLUME INDEX: 26.5 ML/M2
LEFT ATRIUM VOLUME: 43.4 CM3
MAXIMAL PREDICTED HEART RATE: 198 BPM
METHADONE UR QL SCN: NEGATIVE
OPIATES UR QL: NEGATIVE
PCP UR QL SCN: NEGATIVE
STRESS TARGET HR: 168 BPM
TROPONIN I SERPL-MCNC: <0.012 NG/ML (ref 0–0.03)
TROPONIN I SERPL-MCNC: <0.012 NG/ML (ref 0–0.03)

## 2018-11-17 PROCEDURE — 93010 ELECTROCARDIOGRAM REPORT: CPT | Performed by: INTERNAL MEDICINE

## 2018-11-17 PROCEDURE — 80307 DRUG TEST PRSMV CHEM ANLYZR: CPT | Performed by: HOSPITALIST

## 2018-11-17 PROCEDURE — 87486 CHLMYD PNEUM DNA AMP PROBE: CPT | Performed by: HOSPITALIST

## 2018-11-17 PROCEDURE — 87798 DETECT AGENT NOS DNA AMP: CPT | Performed by: HOSPITALIST

## 2018-11-17 PROCEDURE — 93306 TTE W/DOPPLER COMPLETE: CPT | Performed by: INTERNAL MEDICINE

## 2018-11-17 PROCEDURE — 93005 ELECTROCARDIOGRAM TRACING: CPT | Performed by: HOSPITALIST

## 2018-11-17 PROCEDURE — G0378 HOSPITAL OBSERVATION PER HR: HCPCS

## 2018-11-17 PROCEDURE — 93306 TTE W/DOPPLER COMPLETE: CPT

## 2018-11-17 PROCEDURE — 87581 M.PNEUMON DNA AMP PROBE: CPT | Performed by: HOSPITALIST

## 2018-11-17 PROCEDURE — 87633 RESP VIRUS 12-25 TARGETS: CPT | Performed by: HOSPITALIST

## 2018-11-17 PROCEDURE — 96361 HYDRATE IV INFUSION ADD-ON: CPT

## 2018-11-17 PROCEDURE — 94799 UNLISTED PULMONARY SVC/PX: CPT

## 2018-11-17 PROCEDURE — 84484 ASSAY OF TROPONIN QUANT: CPT | Performed by: HOSPITALIST

## 2018-11-17 PROCEDURE — 94760 N-INVAS EAR/PLS OXIMETRY 1: CPT

## 2018-11-17 RX ADMIN — SODIUM CHLORIDE, POTASSIUM CHLORIDE, SODIUM LACTATE AND CALCIUM CHLORIDE 125 ML/HR: 600; 310; 30; 20 INJECTION, SOLUTION INTRAVENOUS at 06:29

## 2018-11-17 NOTE — CONSULTS
[facility] Medicine Consult  Inpatient Hospitalist Consult  Consult performed by: Rut Brooks MD  Consult ordered by: Gretel Lei MD          Date of Admission: 11/16/2018  Date of Consult: 11/16/18    Primary Care Physician: Provider, No Known  Referring Physician: Dr. Harry    Chief Complaint/Reason for Consultation: SOB and peluritic CP    Subjective   History of Present Illness  Patient is a 22 yo female pregnant for the first time at 37 weeks and 3 days. Presented with pleuritic chest pains whenever she takes a deep breath, felt in both anterior and posterior back. Described as pressure and also a squeezing sensation. Started 1 day PTA. Feels a little SOB and nauseous with it. No wheezing or dizziness. No h/o asthma or heart disease. No leg pain or swelling.    Review of Systems   Otherwise complete ROS is negative except as mentioned above.    Past Medical History: History reviewed. No pertinent past medical history.    Past Surgical History:  Past Surgical History:   Procedure Laterality Date   • DENTAL PROCEDURE     • WISDOM TOOTH EXTRACTION         Social History:  reports that  has never smoked. she has never used smokeless tobacco. She reports that she does not drink alcohol or use drugs.    Family History: family history is not on file.     Allergies: No Known Allergies    Medications: Scheduled Meds:  lactated ringers 999 mL/hr Intravenous Once   sodium chloride 3 mL Intravenous Q12H     Continuous Infusions:  lactated ringers 125 mL/hr     PRN Meds:.sodium chloride    Objective     Physical Exam:   Temp:  [98.1 °F (36.7 °C)] 98.1 °F (36.7 °C)  Heart Rate:  [79-94] 94  Resp:  [16] 16  BP: (129-130)/(80-88) 130/88    Constitutional: Patient is oriented to person, place, and time and well-developed, well-nourished, and in no distress.   Head: Normocephalic and atraumatic.   Eyes: Conjunctivae and EOM are normal. Pupils are equal, round, and  reactive to light.   Neck: Normal range of motion. Neck supple. No JVD present. No tracheal deviation present. No thyromegaly present.   Cardiovascular: Normal rate, regular rhythm and normal heart sounds.   Pulmonary/Chest: Effort normal and breath sounds normal. No stridor. No respiratory distress. No wheezes or crackles. Patient exhibits no chest tenderness.   Abdominal: distended/pregnancy. Soft. Bowel sounds are normal. Patient exhibits no distension and no mass. There is no tenderness. There is no rebound and no guarding.   Musculoskeletal: Normal range of motion. Patient exhibits no edema or deformity.   Neurological: Patient is neurological grossly intact.   Skin: Skin is warm and dry. No rash noted. No erythema.   Psychiatric: Mood and affect normal.       Results Reviewed:  I have personally reviewed current lab, radiology and data.    Lab Results (last 24 hours)     ** No results found for the last 24 hours. **          Imaging Results (last 24 hours)     ** No results found for the last 24 hours. **          Assessment / Plan     SOB and pleuritic chest pain (problem)  EKG showed no ischemic changes (SB at 59 with short UT interval of 100 ms  Initial troponin nl, serial troponins pend, on tele  ABGs showed no hypoxia, on continuous pulse ox, PE unlikely, HF unlikely  UDS pend, echo pend  ? Due to pregnancy itself pushing up against the diaphragm - advised to stay on L side    Leukocytosis and anemia  Likely related to pregnancy    I discussed the patient's findings and my recommendations with patient.   Will follow    Rut Brooks MD  11/16/18  9:40 PM

## 2018-11-17 NOTE — NURSING NOTE
2-D echo report shows no valvular abnormalities.  Spoke with Dr. Lei by phone and she is ok for pt to be discharged.

## 2018-11-17 NOTE — PROGRESS NOTES
Alexi Damon  : 1995  MRN: [id number]  CSN: [id number]    Labor progress note    Subjective   She reports is noting fluid leakage, all testing on LDR normal without evidence of ROM.  Also reported CP & SOB - hospitalist evaluating.       Objective   Min/max vitals past 24 hours:  Temp  Min: 98.1 °F (36.7 °C)  Max: 98.1 °F (36.7 °C)   BP  Min: 129/80  Max: 131/80   Pulse  Min: 65  Max: 94   Resp  Min: 16  Max: 16        FHT's: reactive and category 1.  external monitors used   Cervix: was checked (by RN): 1 cm / 50 % / -3   Contractions: irregular      Assessment   1. IUP at 37w4d  2. SOB/CP - normal workup so far  3. No evidence of rupture of membranes     Plan   1. Expectant management   2. OK for d/c per OB when cleared by medicine    Gretel Lei MD  2018  4:24 AM

## 2018-11-20 LAB

## 2018-11-27 ENCOUNTER — HOSPITAL ENCOUNTER (OUTPATIENT)
Facility: HOSPITAL | Age: 23
Setting detail: OBSERVATION
Discharge: HOME | End: 2018-11-28
Attending: OBSTETRICS & GYNECOLOGY | Admitting: OBSTETRICS & GYNECOLOGY

## 2018-11-27 VITALS
SYSTOLIC BLOOD PRESSURE: 140 MMHG | RESPIRATION RATE: 16 BRPM | TEMPERATURE: 97.9 F | HEART RATE: 75 BPM | DIASTOLIC BLOOD PRESSURE: 87 MMHG

## 2018-11-27 PROCEDURE — G0378 HOSPITAL OBSERVATION PER HR: HCPCS

## 2018-11-27 RX ORDER — SODIUM CHLORIDE 0.9 % (FLUSH) 0.9 %
3 SYRINGE (ML) INJECTION EVERY 12 HOURS SCHEDULED
Status: DISCONTINUED | OUTPATIENT
Start: 2018-11-27 | End: 2018-11-28 | Stop reason: HOSPADM

## 2018-11-27 RX ORDER — SODIUM CHLORIDE 0.9 % (FLUSH) 0.9 %
5 SYRINGE (ML) INJECTION AS NEEDED
Status: DISCONTINUED | OUTPATIENT
Start: 2018-11-27 | End: 2018-11-28 | Stop reason: HOSPADM

## 2018-11-27 RX ADMIN — SODIUM CHLORIDE, POTASSIUM CHLORIDE, SODIUM LACTATE AND CALCIUM CHLORIDE 1000 ML: 600; 310; 30; 20 INJECTION, SOLUTION INTRAVENOUS at 21:55

## 2018-11-28 ENCOUNTER — HOSPITAL ENCOUNTER (INPATIENT)
Facility: HOSPITAL | Age: 23
LOS: 2 days | Discharge: HOME | End: 2018-11-30
Attending: OBSTETRICS & GYNECOLOGY | Admitting: OBSTETRICS & GYNECOLOGY

## 2018-11-28 ENCOUNTER — ANESTHESIA EVENT (OUTPATIENT)
Dept: LABOR AND DELIVERY | Facility: HOSPITAL | Age: 23
End: 2018-11-28

## 2018-11-28 ENCOUNTER — ANESTHESIA (OUTPATIENT)
Dept: LABOR AND DELIVERY | Facility: HOSPITAL | Age: 23
End: 2018-11-28

## 2018-11-28 PROBLEM — Z37.9 NORMAL LABOR: Status: RESOLVED | Noted: 2018-11-28 | Resolved: 2018-11-28
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
## 2018-11-28 PROBLEM — Z37.9 NORMAL LABOR: Status: ACTIVE | Noted: 2018-11-28

## 2018-11-28 PROBLEM — Z34.90 PREGNANCY: Status: RESOLVED | Noted: 2018-11-16 | Resolved: 2018-11-28

## 2018-11-28 LAB
A1 MICROGLOB PLACENTAL VAG QL: NEGATIVE
ABO GROUP BLD: NORMAL
BLD GP AB SCN SERPL QL: POSITIVE
DEPRECATED RDW RBC AUTO: 43.1 FL (ref 40–54)
ERYTHROCYTE [DISTWIDTH] IN BLOOD BY AUTOMATED COUNT: 13.5 % (ref 12–15)
GIANT PLATELETS: ABNORMAL
HCT VFR BLD AUTO: 36 % (ref 37–47)
HGB BLD-MCNC: 12.6 G/DL (ref 12–16)
LYMPHOCYTES # BLD MANUAL: 2.12 10*3/MM3 (ref 0.72–4.86)
LYMPHOCYTES NFR BLD MANUAL: 10 % (ref 15–45)
LYMPHOCYTES NFR BLD MANUAL: 12 % (ref 4–12)
MCH RBC QN AUTO: 31 PG (ref 28–32)
MCHC RBC AUTO-ENTMCNC: 35 G/DL (ref 33–36)
MCV RBC AUTO: 88.5 FL (ref 82–98)
MONOCYTES # BLD AUTO: 2.55 10*3/MM3 (ref 0.19–1.3)
NEUTROPHILS # BLD AUTO: 16.55 10*3/MM3 (ref 1.87–8.4)
NEUTROPHILS NFR BLD MANUAL: 78 % (ref 39–78)
PLATELET # BLD AUTO: 238 10*3/MM3 (ref 130–400)
PMV BLD AUTO: 11.1 FL (ref 6–12)
RBC # BLD AUTO: 4.07 10*6/MM3 (ref 4.2–5.4)
RBC MORPH BLD: NORMAL
RESIDUAL RHIG DETECTED: NORMAL
RH BLD: NEGATIVE
T&S EXPIRATION DATE: NORMAL
WBC MORPH BLD: NORMAL
WBC NRBC COR # BLD: 21.22 10*3/MM3 (ref 4.8–10.8)

## 2018-11-28 PROCEDURE — 86870 RBC ANTIBODY IDENTIFICATION: CPT | Performed by: OBSTETRICS & GYNECOLOGY

## 2018-11-28 PROCEDURE — 25010000002 ROPIVACAINE PER 1 MG: Performed by: NURSE ANESTHETIST, CERTIFIED REGISTERED

## 2018-11-28 PROCEDURE — C1755 CATHETER, INTRASPINAL: HCPCS | Performed by: NURSE ANESTHETIST, CERTIFIED REGISTERED

## 2018-11-28 PROCEDURE — 4A1HX4Z MONITORING OF PRODUCTS OF CONCEPTION, CARDIAC ELECTRICAL ACTIVITY, EXTERNAL APPROACH: ICD-10-PCS | Performed by: OBSTETRICS & GYNECOLOGY

## 2018-11-28 PROCEDURE — 0HQ9XZZ REPAIR PERINEUM SKIN, EXTERNAL APPROACH: ICD-10-PCS | Performed by: OBSTETRICS & GYNECOLOGY

## 2018-11-28 PROCEDURE — 85027 COMPLETE CBC AUTOMATED: CPT | Performed by: OBSTETRICS & GYNECOLOGY

## 2018-11-28 PROCEDURE — 84112 EVAL AMNIOTIC FLUID PROTEIN: CPT | Performed by: OBSTETRICS & GYNECOLOGY

## 2018-11-28 PROCEDURE — 25010000002 NALBUPHINE PER 10 MG: Performed by: OBSTETRICS & GYNECOLOGY

## 2018-11-28 PROCEDURE — 25010000002 FENTANYL CITRATE (PF) 250 MCG/5ML SOLUTION: Performed by: NURSE ANESTHETIST, CERTIFIED REGISTERED

## 2018-11-28 PROCEDURE — 25010000002 BUTORPHANOL PER 1 MG: Performed by: OBSTETRICS & GYNECOLOGY

## 2018-11-28 PROCEDURE — G0378 HOSPITAL OBSERVATION PER HR: HCPCS

## 2018-11-28 PROCEDURE — 96374 THER/PROPH/DIAG INJ IV PUSH: CPT

## 2018-11-28 PROCEDURE — 51702 INSERT TEMP BLADDER CATH: CPT

## 2018-11-28 PROCEDURE — 88307 TISSUE EXAM BY PATHOLOGIST: CPT | Performed by: OBSTETRICS & GYNECOLOGY

## 2018-11-28 PROCEDURE — 86850 RBC ANTIBODY SCREEN: CPT | Performed by: OBSTETRICS & GYNECOLOGY

## 2018-11-28 PROCEDURE — 86901 BLOOD TYPING SEROLOGIC RH(D): CPT | Performed by: OBSTETRICS & GYNECOLOGY

## 2018-11-28 PROCEDURE — 86900 BLOOD TYPING SEROLOGIC ABO: CPT | Performed by: OBSTETRICS & GYNECOLOGY

## 2018-11-28 RX ORDER — OXYTOCIN/0.9 % SODIUM CHLORIDE 30/500 ML
2-30 PLASTIC BAG, INJECTION (ML) INTRAVENOUS
Status: DISCONTINUED | OUTPATIENT
Start: 2018-11-28 | End: 2018-11-29

## 2018-11-28 RX ORDER — PROMETHAZINE HYDROCHLORIDE 25 MG/1
12.5 TABLET ORAL EVERY 6 HOURS PRN
Status: DISCONTINUED | OUTPATIENT
Start: 2018-11-28 | End: 2018-11-29 | Stop reason: HOSPADM

## 2018-11-28 RX ORDER — ONDANSETRON 2 MG/ML
4 INJECTION INTRAMUSCULAR; INTRAVENOUS EVERY 6 HOURS PRN
Status: DISCONTINUED | OUTPATIENT
Start: 2018-11-28 | End: 2018-11-29 | Stop reason: HOSPADM

## 2018-11-28 RX ORDER — ONDANSETRON 2 MG/ML
4 INJECTION INTRAMUSCULAR; INTRAVENOUS EVERY 6 HOURS PRN
Status: CANCELLED | OUTPATIENT
Start: 2018-11-28

## 2018-11-28 RX ORDER — FAMOTIDINE 10 MG/ML
20 INJECTION, SOLUTION INTRAVENOUS ONCE AS NEEDED
Status: DISCONTINUED | OUTPATIENT
Start: 2018-11-28 | End: 2018-11-29

## 2018-11-28 RX ORDER — LIDOCAINE HYDROCHLORIDE 20 MG/ML
INJECTION, SOLUTION EPIDURAL; INFILTRATION; INTRACAUDAL; PERINEURAL AS NEEDED
Status: DISCONTINUED | OUTPATIENT
Start: 2018-11-28 | End: 2018-11-29 | Stop reason: SURG

## 2018-11-28 RX ORDER — MORPHINE SULFATE 2 MG/ML
2 INJECTION, SOLUTION INTRAMUSCULAR; INTRAVENOUS
Status: CANCELLED | OUTPATIENT
Start: 2018-11-28 | End: 2018-12-08

## 2018-11-28 RX ORDER — ONDANSETRON 2 MG/ML
4 INJECTION INTRAMUSCULAR; INTRAVENOUS EVERY 6 HOURS PRN
Status: DISCONTINUED | OUTPATIENT
Start: 2018-11-28 | End: 2018-11-29

## 2018-11-28 RX ORDER — FENTANYL CITRATE 50 UG/ML
INJECTION, SOLUTION INTRAMUSCULAR; INTRAVENOUS AS NEEDED
Status: DISCONTINUED | OUTPATIENT
Start: 2018-11-28 | End: 2018-11-29 | Stop reason: SURG

## 2018-11-28 RX ORDER — METHYLERGONOVINE MALEATE 0.2 MG/ML
200 INJECTION INTRAVENOUS ONCE AS NEEDED
Status: DISCONTINUED | OUTPATIENT
Start: 2018-11-28 | End: 2018-11-29 | Stop reason: HOSPADM

## 2018-11-28 RX ORDER — MISOPROSTOL 200 UG/1
800 TABLET ORAL AS NEEDED
Status: CANCELLED | OUTPATIENT
Start: 2018-11-28

## 2018-11-28 RX ORDER — MISOPROSTOL 200 UG/1
800 TABLET ORAL AS NEEDED
Status: DISCONTINUED | OUTPATIENT
Start: 2018-11-28 | End: 2018-11-29 | Stop reason: HOSPADM

## 2018-11-28 RX ORDER — PROMETHAZINE HYDROCHLORIDE 25 MG/ML
12.5 INJECTION, SOLUTION INTRAMUSCULAR; INTRAVENOUS EVERY 6 HOURS PRN
Status: DISCONTINUED | OUTPATIENT
Start: 2018-11-28 | End: 2018-11-29 | Stop reason: HOSPADM

## 2018-11-28 RX ORDER — SODIUM CHLORIDE 0.9 % (FLUSH) 0.9 %
3-10 SYRINGE (ML) INJECTION AS NEEDED
Status: DISCONTINUED | OUTPATIENT
Start: 2018-11-28 | End: 2018-11-29

## 2018-11-28 RX ORDER — ONDANSETRON 4 MG/1
4 TABLET, FILM COATED ORAL EVERY 6 HOURS PRN
Status: CANCELLED | OUTPATIENT
Start: 2018-11-28

## 2018-11-28 RX ORDER — EPHEDRINE SULFATE 50 MG/ML
5 INJECTION, SOLUTION INTRAVENOUS
Status: DISCONTINUED | OUTPATIENT
Start: 2018-11-28 | End: 2018-11-29

## 2018-11-28 RX ORDER — LIDOCAINE HYDROCHLORIDE 10 MG/ML
5 INJECTION, SOLUTION EPIDURAL; INFILTRATION; INTRACAUDAL; PERINEURAL AS NEEDED
Status: DISCONTINUED | OUTPATIENT
Start: 2018-11-28 | End: 2018-11-29

## 2018-11-28 RX ORDER — TRISODIUM CITRATE DIHYDRATE AND CITRIC ACID MONOHYDRATE 500; 334 MG/5ML; MG/5ML
15 SOLUTION ORAL ONCE
Status: DISCONTINUED | OUTPATIENT
Start: 2018-11-28 | End: 2018-11-29

## 2018-11-28 RX ORDER — SODIUM CHLORIDE, SODIUM LACTATE, POTASSIUM CHLORIDE, CALCIUM CHLORIDE 600; 310; 30; 20 MG/100ML; MG/100ML; MG/100ML; MG/100ML
125 INJECTION, SOLUTION INTRAVENOUS CONTINUOUS
Status: DISCONTINUED | OUTPATIENT
Start: 2018-11-28 | End: 2018-11-29

## 2018-11-28 RX ORDER — OXYTOCIN/RINGER'S LACTATE 20/1000 ML
999 PLASTIC BAG, INJECTION (ML) INTRAVENOUS ONCE
Status: COMPLETED | OUTPATIENT
Start: 2018-11-28 | End: 2018-11-29

## 2018-11-28 RX ORDER — CARBOPROST TROMETHAMINE 250 UG/ML
250 INJECTION, SOLUTION INTRAMUSCULAR AS NEEDED
Status: DISCONTINUED | OUTPATIENT
Start: 2018-11-28 | End: 2018-11-29 | Stop reason: HOSPADM

## 2018-11-28 RX ORDER — IBUPROFEN 800 MG/1
800 TABLET ORAL EVERY 8 HOURS PRN
Status: DISCONTINUED | OUTPATIENT
Start: 2018-11-28 | End: 2018-11-29 | Stop reason: HOSPADM

## 2018-11-28 RX ORDER — PROMETHAZINE HYDROCHLORIDE 25 MG/ML
12.5 INJECTION, SOLUTION INTRAMUSCULAR; INTRAVENOUS EVERY 6 HOURS PRN
Status: CANCELLED | OUTPATIENT
Start: 2018-11-28

## 2018-11-28 RX ORDER — SODIUM CHLORIDE, SODIUM LACTATE, POTASSIUM CHLORIDE, CALCIUM CHLORIDE 600; 310; 30; 20 MG/100ML; MG/100ML; MG/100ML; MG/100ML
999 INJECTION, SOLUTION INTRAVENOUS ONCE
Status: COMPLETED | OUTPATIENT
Start: 2018-11-28 | End: 2018-11-28

## 2018-11-28 RX ORDER — ONDANSETRON 4 MG/1
4 TABLET, ORALLY DISINTEGRATING ORAL EVERY 6 HOURS PRN
Status: CANCELLED | OUTPATIENT
Start: 2018-11-28

## 2018-11-28 RX ORDER — PROMETHAZINE HYDROCHLORIDE 12.5 MG/1
12.5 SUPPOSITORY RECTAL EVERY 6 HOURS PRN
Status: DISCONTINUED | OUTPATIENT
Start: 2018-11-28 | End: 2018-11-29 | Stop reason: HOSPADM

## 2018-11-28 RX ORDER — NALBUPHINE HCL 10 MG/ML
5 AMPUL (ML) INJECTION ONCE
Status: COMPLETED | OUTPATIENT
Start: 2018-11-28 | End: 2018-11-28

## 2018-11-28 RX ORDER — HYDROCODONE BITARTRATE AND ACETAMINOPHEN 5; 325 MG/1; MG/1
1 TABLET ORAL EVERY 4 HOURS PRN
Status: CANCELLED | OUTPATIENT
Start: 2018-11-28 | End: 2018-12-08

## 2018-11-28 RX ORDER — CARBOPROST TROMETHAMINE 250 UG/ML
250 INJECTION, SOLUTION INTRAMUSCULAR AS NEEDED
Status: CANCELLED | OUTPATIENT
Start: 2018-11-28

## 2018-11-28 RX ORDER — BUPIVACAINE HCL/0.9 % NACL/PF 0.1 %
2 PLASTIC BAG, INJECTION (ML) EPIDURAL ONCE
Status: DISCONTINUED | OUTPATIENT
Start: 2018-11-28 | End: 2018-11-29

## 2018-11-28 RX ORDER — IBUPROFEN 800 MG/1
800 TABLET ORAL EVERY 8 HOURS PRN
Status: CANCELLED | OUTPATIENT
Start: 2018-11-28

## 2018-11-28 RX ORDER — LIDOCAINE HYDROCHLORIDE AND EPINEPHRINE 15; 5 MG/ML; UG/ML
INJECTION, SOLUTION EPIDURAL AS NEEDED
Status: DISCONTINUED | OUTPATIENT
Start: 2018-11-28 | End: 2018-11-29 | Stop reason: SURG

## 2018-11-28 RX ORDER — BUTORPHANOL TARTRATE 1 MG/ML
1 INJECTION, SOLUTION INTRAMUSCULAR; INTRAVENOUS
Status: DISCONTINUED | OUTPATIENT
Start: 2018-11-28 | End: 2018-11-29

## 2018-11-28 RX ORDER — METHYLERGONOVINE MALEATE 0.2 MG/ML
200 INJECTION INTRAVENOUS AS NEEDED
Status: CANCELLED | OUTPATIENT
Start: 2018-11-28

## 2018-11-28 RX ORDER — ONDANSETRON 4 MG/1
4 TABLET, ORALLY DISINTEGRATING ORAL EVERY 6 HOURS PRN
Status: DISCONTINUED | OUTPATIENT
Start: 2018-11-28 | End: 2018-11-29 | Stop reason: HOSPADM

## 2018-11-28 RX ORDER — ONDANSETRON 4 MG/1
4 TABLET, FILM COATED ORAL EVERY 6 HOURS PRN
Status: DISCONTINUED | OUTPATIENT
Start: 2018-11-28 | End: 2018-11-29 | Stop reason: HOSPADM

## 2018-11-28 RX ORDER — SODIUM CHLORIDE 0.9 % (FLUSH) 0.9 %
3 SYRINGE (ML) INJECTION EVERY 12 HOURS SCHEDULED
Status: DISCONTINUED | OUTPATIENT
Start: 2018-11-28 | End: 2018-11-29

## 2018-11-28 RX ORDER — ROPIVACAINE HYDROCHLORIDE 5 MG/ML
INJECTION, SOLUTION EPIDURAL; INFILTRATION; PERINEURAL AS NEEDED
Status: DISCONTINUED | OUTPATIENT
Start: 2018-11-28 | End: 2018-11-29 | Stop reason: SURG

## 2018-11-28 RX ORDER — PROMETHAZINE HYDROCHLORIDE 12.5 MG/1
12.5 SUPPOSITORY RECTAL EVERY 6 HOURS PRN
Status: CANCELLED | OUTPATIENT
Start: 2018-11-28

## 2018-11-28 RX ORDER — PROMETHAZINE HYDROCHLORIDE 25 MG/1
12.5 TABLET ORAL EVERY 6 HOURS PRN
Status: CANCELLED | OUTPATIENT
Start: 2018-11-28

## 2018-11-28 RX ORDER — OXYTOCIN/RINGER'S LACTATE 20/1000 ML
125 PLASTIC BAG, INJECTION (ML) INTRAVENOUS AS NEEDED
Status: DISCONTINUED | OUTPATIENT
Start: 2018-11-28 | End: 2018-11-29 | Stop reason: HOSPADM

## 2018-11-28 RX ADMIN — ROPIVACAINE HYDROCHLORIDE 8 ML: 5 INJECTION, SOLUTION EPIDURAL; INFILTRATION; PERINEURAL at 20:08

## 2018-11-28 RX ADMIN — LIDOCAINE HYDROCHLORIDE 3 ML: 20 INJECTION, SOLUTION EPIDURAL; INFILTRATION; INTRACAUDAL; PERINEURAL at 20:02

## 2018-11-28 RX ADMIN — LIDOCAINE HYDROCHLORIDE AND EPINEPHRINE 3 ML: 15; 5 INJECTION, SOLUTION EPIDURAL at 20:06

## 2018-11-28 RX ADMIN — NALBUPHINE HYDROCHLORIDE 5 MG: 10 INJECTION, SOLUTION INTRAMUSCULAR; INTRAVENOUS; SUBCUTANEOUS at 00:26

## 2018-11-28 RX ADMIN — FENTANYL CITRATE 150 MCG: 50 INJECTION INTRAMUSCULAR; INTRAVENOUS at 20:15

## 2018-11-28 RX ADMIN — SODIUM CHLORIDE, POTASSIUM CHLORIDE, SODIUM LACTATE AND CALCIUM CHLORIDE 125 ML/HR: 600; 310; 30; 20 INJECTION, SOLUTION INTRAVENOUS at 20:19

## 2018-11-28 RX ADMIN — SODIUM CHLORIDE, POTASSIUM CHLORIDE, SODIUM LACTATE AND CALCIUM CHLORIDE 125 ML/HR: 600; 310; 30; 20 INJECTION, SOLUTION INTRAVENOUS at 16:33

## 2018-11-28 RX ADMIN — OXYTOCIN 999 ML/HR: 10 INJECTION INTRAVENOUS at 22:07

## 2018-11-28 RX ADMIN — ROPIVACAINE HYDROCHLORIDE 4 ML/HR: 2 INJECTION, SOLUTION EPIDURAL; INFILTRATION at 20:15

## 2018-11-28 RX ADMIN — OXYTOCIN-SODIUM CHLORIDE 0.9% IV SOLN 30 UNIT/500ML 2 MILLI-UNITS/MIN: 30-0.9/5 SOLUTION at 21:18

## 2018-11-28 RX ADMIN — BUTORPHANOL TARTRATE 1 MG: 1 INJECTION, SOLUTION INTRAMUSCULAR; INTRAVENOUS at 17:59

## 2018-11-28 RX ADMIN — SODIUM CHLORIDE, POTASSIUM CHLORIDE, SODIUM LACTATE AND CALCIUM CHLORIDE 999 ML/HR: 600; 310; 30; 20 INJECTION, SOLUTION INTRAVENOUS at 19:32

## 2018-11-28 RX ADMIN — FENTANYL CITRATE 100 MCG: 50 INJECTION INTRAMUSCULAR; INTRAVENOUS at 20:08

## 2018-11-28 RX ADMIN — BUTORPHANOL TARTRATE 1 MG: 1 INJECTION, SOLUTION INTRAMUSCULAR; INTRAVENOUS at 15:45

## 2018-11-28 RX ADMIN — SODIUM CHLORIDE, POTASSIUM CHLORIDE, SODIUM LACTATE AND CALCIUM CHLORIDE 1000 ML: 600; 310; 30; 20 INJECTION, SOLUTION INTRAVENOUS at 15:30

## 2018-11-29 LAB
ABO GROUP BLD: NORMAL
BLD GP AB SCN SERPL QL: NEGATIVE
FETAL BLEED: NEGATIVE
HCT VFR BLD AUTO: 28.5 % (ref 37–47)
HGB BLD-MCNC: 9.9 G/DL (ref 12–16)
NUMBER OF DOSES: NORMAL
RH BLD: NEGATIVE

## 2018-11-29 PROCEDURE — 85018 HEMOGLOBIN: CPT | Performed by: OBSTETRICS & GYNECOLOGY

## 2018-11-29 PROCEDURE — 90471 IMMUNIZATION ADMIN: CPT | Performed by: OBSTETRICS & GYNECOLOGY

## 2018-11-29 PROCEDURE — 25010000003 RHO D IMMUNE GLOBULIN 1500 UNITS SOLUTION PREFILLED SYRINGE: Performed by: OBSTETRICS & GYNECOLOGY

## 2018-11-29 PROCEDURE — 86850 RBC ANTIBODY SCREEN: CPT

## 2018-11-29 PROCEDURE — 85461 HEMOGLOBIN FETAL: CPT | Performed by: OBSTETRICS & GYNECOLOGY

## 2018-11-29 PROCEDURE — 86900 BLOOD TYPING SEROLOGIC ABO: CPT | Performed by: OBSTETRICS & GYNECOLOGY

## 2018-11-29 PROCEDURE — 25010000002 TDAP 5-2.5-18.5 LF-MCG/0.5 SUSPENSION: Performed by: OBSTETRICS & GYNECOLOGY

## 2018-11-29 PROCEDURE — 85014 HEMATOCRIT: CPT | Performed by: OBSTETRICS & GYNECOLOGY

## 2018-11-29 PROCEDURE — 86901 BLOOD TYPING SEROLOGIC RH(D): CPT | Performed by: OBSTETRICS & GYNECOLOGY

## 2018-11-29 PROCEDURE — 90715 TDAP VACCINE 7 YRS/> IM: CPT | Performed by: OBSTETRICS & GYNECOLOGY

## 2018-11-29 RX ORDER — PROMETHAZINE HYDROCHLORIDE 25 MG/ML
12.5 INJECTION, SOLUTION INTRAMUSCULAR; INTRAVENOUS EVERY 6 HOURS PRN
Status: DISCONTINUED | OUTPATIENT
Start: 2018-11-29 | End: 2018-11-30 | Stop reason: HOSPADM

## 2018-11-29 RX ORDER — PROMETHAZINE HYDROCHLORIDE 12.5 MG/1
12.5 SUPPOSITORY RECTAL EVERY 6 HOURS PRN
Status: DISCONTINUED | OUTPATIENT
Start: 2018-11-29 | End: 2018-11-30 | Stop reason: HOSPADM

## 2018-11-29 RX ORDER — ONDANSETRON 4 MG/1
4 TABLET, FILM COATED ORAL EVERY 6 HOURS PRN
Status: DISCONTINUED | OUTPATIENT
Start: 2018-11-29 | End: 2018-11-30 | Stop reason: HOSPADM

## 2018-11-29 RX ORDER — MISOPROSTOL 200 UG/1
600 TABLET ORAL ONCE AS NEEDED
Status: DISCONTINUED | OUTPATIENT
Start: 2018-11-29 | End: 2018-11-30 | Stop reason: HOSPADM

## 2018-11-29 RX ORDER — PROMETHAZINE HYDROCHLORIDE 25 MG/1
25 TABLET ORAL EVERY 6 HOURS PRN
Status: DISCONTINUED | OUTPATIENT
Start: 2018-11-29 | End: 2018-11-30 | Stop reason: HOSPADM

## 2018-11-29 RX ORDER — CARBOPROST TROMETHAMINE 250 UG/ML
250 INJECTION, SOLUTION INTRAMUSCULAR ONCE AS NEEDED
Status: DISCONTINUED | OUTPATIENT
Start: 2018-11-29 | End: 2018-11-30 | Stop reason: HOSPADM

## 2018-11-29 RX ORDER — METHYLERGONOVINE MALEATE 0.2 MG/ML
200 INJECTION INTRAVENOUS ONCE AS NEEDED
Status: DISCONTINUED | OUTPATIENT
Start: 2018-11-29 | End: 2018-11-30 | Stop reason: HOSPADM

## 2018-11-29 RX ORDER — DOCUSATE SODIUM 100 MG/1
100 CAPSULE, LIQUID FILLED ORAL 2 TIMES DAILY PRN
Status: DISCONTINUED | OUTPATIENT
Start: 2018-11-29 | End: 2018-11-30 | Stop reason: HOSPADM

## 2018-11-29 RX ORDER — ONDANSETRON 2 MG/ML
4 INJECTION INTRAMUSCULAR; INTRAVENOUS EVERY 6 HOURS PRN
Status: DISCONTINUED | OUTPATIENT
Start: 2018-11-29 | End: 2018-11-30 | Stop reason: HOSPADM

## 2018-11-29 RX ORDER — PRENATAL VIT/IRON FUM/FOLIC AC 27MG-0.8MG
1 TABLET ORAL DAILY
Status: DISCONTINUED | OUTPATIENT
Start: 2018-11-29 | End: 2018-11-30 | Stop reason: HOSPADM

## 2018-11-29 RX ORDER — IBUPROFEN 800 MG/1
800 TABLET ORAL EVERY 8 HOURS PRN
Status: DISCONTINUED | OUTPATIENT
Start: 2018-11-29 | End: 2018-11-30 | Stop reason: HOSPADM

## 2018-11-29 RX ORDER — BISACODYL 10 MG
10 SUPPOSITORY, RECTAL RECTAL DAILY PRN
Status: DISCONTINUED | OUTPATIENT
Start: 2018-11-29 | End: 2018-11-30 | Stop reason: HOSPADM

## 2018-11-29 RX ORDER — ONDANSETRON 4 MG/1
4 TABLET, ORALLY DISINTEGRATING ORAL EVERY 6 HOURS PRN
Status: DISCONTINUED | OUTPATIENT
Start: 2018-11-29 | End: 2018-11-30 | Stop reason: HOSPADM

## 2018-11-29 RX ORDER — OXYCODONE HYDROCHLORIDE AND ACETAMINOPHEN 5; 325 MG/1; MG/1
1 TABLET ORAL EVERY 6 HOURS PRN
Status: DISCONTINUED | OUTPATIENT
Start: 2018-11-29 | End: 2018-11-30 | Stop reason: HOSPADM

## 2018-11-29 RX ORDER — SODIUM CHLORIDE 0.9 % (FLUSH) 0.9 %
1-10 SYRINGE (ML) INJECTION AS NEEDED
Status: DISCONTINUED | OUTPATIENT
Start: 2018-11-29 | End: 2018-11-30 | Stop reason: HOSPADM

## 2018-11-29 RX ADMIN — OXYTOCIN 125 ML/HR: 10 INJECTION INTRAVENOUS at 01:09

## 2018-11-29 RX ADMIN — TETANUS TOXOID, REDUCED DIPHTHERIA TOXOID AND ACELLULAR PERTUSSIS VACCINE, ADSORBED 0.5 ML: 5; 2.5; 8; 8; 2.5 SUSPENSION INTRAMUSCULAR at 13:37

## 2018-11-29 RX ADMIN — OXYCODONE HYDROCHLORIDE AND ACETAMINOPHEN 1 TABLET: 5; 325 TABLET ORAL at 09:28

## 2018-11-29 RX ADMIN — HUMAN RHO(D) IMMUNE GLOBULIN 1500 UNITS: 300 INJECTION, SOLUTION INTRAMUSCULAR at 13:28

## 2018-11-29 RX ADMIN — OXYCODONE HYDROCHLORIDE AND ACETAMINOPHEN 1 TABLET: 5; 325 TABLET ORAL at 19:57

## 2018-11-29 RX ADMIN — IBUPROFEN 800 MG: 800 TABLET ORAL at 02:34

## 2018-11-29 RX ADMIN — IBUPROFEN 800 MG: 800 TABLET ORAL at 13:37

## 2018-11-29 RX ADMIN — PRENATAL VIT W/ FE FUMARATE-FA TAB 27-0.8 MG 1 TABLET: 27-0.8 TAB at 09:28

## 2018-11-29 RX ADMIN — OXYCODONE HYDROCHLORIDE AND ACETAMINOPHEN 1 TABLET: 5; 325 TABLET ORAL at 02:34

## 2018-11-29 NOTE — ANESTHESIA POSTPROCEDURE EVALUATION
Patient: Dianna Damon    Procedure Summary     Date:  11/28/18 Room / Location:      Anesthesia Start:  1944 Anesthesia Stop:  2201    Procedure:  LABOR ANALGESIA Diagnosis:      Scheduled Providers:   Provider:  Jhonny Randall CRNA    Anesthesia Type:  epidural ASA Status:  2          Anesthesia Type: epidural  Last vitals  BP   119/61 (11/29/18 0435)   Temp   98.6 °F (37 °C) (11/29/18 0435)   Pulse   104 (11/29/18 0435)   Resp   18 (11/29/18 0435)     SpO2   96 % (11/29/18 0435)     Post Anesthesia Care and Evaluation    Patient location during evaluation: floor  Patient participation: complete - patient participated  Level of consciousness: awake  Pain score: 2  Pain management: adequate  Airway patency: patent  Anesthetic complications: No anesthetic complications  PONV Status: none  Cardiovascular status: acceptable  Respiratory status: acceptable  Hydration status: acceptable

## 2018-11-29 NOTE — ANESTHESIA PREPROCEDURE EVALUATION
Anesthesia Evaluation     Patient summary reviewed and Nursing notes reviewed   NPO Solid Status: > 8 hours  NPO Liquid Status: > 6 hours           Airway   Mallampati: II  TM distance: >3 FB  Neck ROM: full  No difficulty expected  Dental - normal exam     Pulmonary - negative pulmonary ROS and normal exam   Cardiovascular - negative cardio ROS and normal exam    ECG reviewed      ROS comment: Pt presented to ER 11/17/18 c/o of CP SOB EKG and 2Decho normal  · Structurally normal heart - normal size and function of all chambers, no valvular problems.  · Normal LVEF.    Neuro/Psych- negative ROS  GI/Hepatic/Renal/Endo - negative ROS     Musculoskeletal (-) negative ROS    Abdominal  - normal exam   Substance History - negative use     OB/GYN    (+) Pregnant,         Other - negative ROS                       Anesthesia Plan    ASA 2     epidural     Anesthetic plan, all risks, benefits, and alternatives have been provided, discussed and informed consent has been obtained with: patient.

## 2018-11-29 NOTE — ANESTHESIA PROCEDURE NOTES
Labor Epidural      Patient location during procedure: OB  Indication:at surgeon's request  Preanesthetic Checklist  Completed: patient identified, site marked, surgical consent, pre-op evaluation, timeout performed, IV checked, risks and benefits discussed and monitors and equipment checked  Prep:  Pt Position:sitting  Sterile Tech:cap, gloves and mask  Prep:chlorhexidine gluconate and isopropyl alcohol  Monitoring:blood pressure monitoring and continuous pulse oximetry  Epidural Block Procedure:  Approach:midline  Guidance:landmark technique and palpation technique  Location:L4-L5  Needle Type:Tuohy  Needle Gauge:18 G  Loss of Resistance Medium: saline  Paresthesia: none  Aspiration:negative  Test Dose:negative  Number of Attempts: 1  Post Assessment:  Dressing:biopatch applied, occlusive dressing applied and secured with tape  Pt Tolerance:patient tolerated the procedure well with no apparent complications  Complications:no

## 2018-11-30 ENCOUNTER — APPOINTMENT (OUTPATIENT)
Dept: GENERAL RADIOLOGY | Facility: HOSPITAL | Age: 23
End: 2018-11-30

## 2018-11-30 VITALS
SYSTOLIC BLOOD PRESSURE: 126 MMHG | DIASTOLIC BLOOD PRESSURE: 78 MMHG | RESPIRATION RATE: 16 BRPM | HEART RATE: 67 BPM | TEMPERATURE: 96.7 F | OXYGEN SATURATION: 96 %

## 2018-11-30 LAB
BACTERIA UR QL AUTO: ABNORMAL /HPF
BILIRUB UR QL STRIP: NEGATIVE
CLARITY UR: CLEAR
COLOR UR: YELLOW
FLUAV AG NPH QL: NEGATIVE
FLUBV AG NPH QL IA: NEGATIVE
GLUCOSE UR STRIP-MCNC: NEGATIVE MG/DL
HGB UR QL STRIP.AUTO: ABNORMAL
HYALINE CASTS UR QL AUTO: ABNORMAL /LPF
KETONES UR QL STRIP: NEGATIVE
LEUKOCYTE ESTERASE UR QL STRIP.AUTO: ABNORMAL
NITRITE UR QL STRIP: NEGATIVE
PH UR STRIP.AUTO: 7.5 [PH] (ref 5–8)
PROT UR QL STRIP: NEGATIVE
RBC # UR: ABNORMAL /HPF
REF LAB TEST METHOD: ABNORMAL
S PYO AG THROAT QL: NEGATIVE
SP GR UR STRIP: 1.01 (ref 1–1.03)
SQUAMOUS #/AREA URNS HPF: ABNORMAL /HPF
UROBILINOGEN UR QL STRIP: ABNORMAL
WBC UR QL AUTO: ABNORMAL /HPF

## 2018-11-30 PROCEDURE — 87880 STREP A ASSAY W/OPTIC: CPT | Performed by: OBSTETRICS & GYNECOLOGY

## 2018-11-30 PROCEDURE — 81001 URINALYSIS AUTO W/SCOPE: CPT | Performed by: OBSTETRICS & GYNECOLOGY

## 2018-11-30 PROCEDURE — 87185 SC STD ENZYME DETCJ PER NZM: CPT | Performed by: OBSTETRICS & GYNECOLOGY

## 2018-11-30 PROCEDURE — 71046 X-RAY EXAM CHEST 2 VIEWS: CPT

## 2018-11-30 PROCEDURE — 25010000002 CEFTRIAXONE PER 250 MG: Performed by: OBSTETRICS & GYNECOLOGY

## 2018-11-30 PROCEDURE — 87040 BLOOD CULTURE FOR BACTERIA: CPT | Performed by: OBSTETRICS & GYNECOLOGY

## 2018-11-30 PROCEDURE — 87186 SC STD MICRODIL/AGAR DIL: CPT | Performed by: OBSTETRICS & GYNECOLOGY

## 2018-11-30 PROCEDURE — 87086 URINE CULTURE/COLONY COUNT: CPT | Performed by: OBSTETRICS & GYNECOLOGY

## 2018-11-30 PROCEDURE — 87077 CULTURE AEROBIC IDENTIFY: CPT | Performed by: OBSTETRICS & GYNECOLOGY

## 2018-11-30 PROCEDURE — 87147 CULTURE TYPE IMMUNOLOGIC: CPT | Performed by: OBSTETRICS & GYNECOLOGY

## 2018-11-30 PROCEDURE — 87804 INFLUENZA ASSAY W/OPTIC: CPT | Performed by: OBSTETRICS & GYNECOLOGY

## 2018-11-30 PROCEDURE — 87081 CULTURE SCREEN ONLY: CPT | Performed by: OBSTETRICS & GYNECOLOGY

## 2018-11-30 RX ORDER — OXYCODONE HYDROCHLORIDE AND ACETAMINOPHEN 5; 325 MG/1; MG/1
1 TABLET ORAL EVERY 6 HOURS PRN
Qty: 10 TABLET | Refills: 0 | Status: SHIPPED | OUTPATIENT
Start: 2018-11-30 | End: 2018-12-09

## 2018-11-30 RX ORDER — IBUPROFEN 800 MG/1
800 TABLET ORAL EVERY 8 HOURS PRN
Qty: 90 TABLET | Refills: 2 | Status: ON HOLD | OUTPATIENT
Start: 2018-11-30 | End: 2019-09-12

## 2018-11-30 RX ADMIN — PRENATAL VIT W/ FE FUMARATE-FA TAB 27-0.8 MG 1 TABLET: 27-0.8 TAB at 11:46

## 2018-11-30 RX ADMIN — IBUPROFEN 800 MG: 800 TABLET ORAL at 00:47

## 2018-11-30 RX ADMIN — VARICELLA VIRUS VACCINE LIVE 0.5 ML: 1350 INJECTION, POWDER, LYOPHILIZED, FOR SUSPENSION SUBCUTANEOUS at 18:51

## 2018-11-30 RX ADMIN — SODIUM CHLORIDE, PRESERVATIVE FREE 10 ML: 5 INJECTION INTRAVENOUS at 13:52

## 2018-11-30 RX ADMIN — CEFTRIAXONE SODIUM 2 G: 2 INJECTION, POWDER, FOR SOLUTION INTRAMUSCULAR; INTRAVENOUS at 13:52

## 2018-11-30 RX ADMIN — IBUPROFEN 800 MG: 800 TABLET ORAL at 11:46

## 2018-11-30 RX ADMIN — OXYCODONE HYDROCHLORIDE AND ACETAMINOPHEN 1 TABLET: 5; 325 TABLET ORAL at 19:07

## 2018-12-02 LAB — BACTERIA SPEC AEROBE CULT: NORMAL

## 2018-12-03 LAB
B-LACTAMASE USUAL SUSC ISLT: POSITIVE
BACTERIA SPEC AEROBE CULT: ABNORMAL
CYTO UR: NORMAL
LAB AP CASE REPORT: NORMAL
PATH REPORT.FINAL DX SPEC: NORMAL
PATH REPORT.GROSS SPEC: NORMAL

## 2018-12-05 LAB
BACTERIA SPEC AEROBE CULT: NORMAL
BACTERIA SPEC AEROBE CULT: NORMAL

## 2019-03-14 ENCOUNTER — HOSPITAL ENCOUNTER (EMERGENCY)
Facility: HOSPITAL | Age: 24
Discharge: HOME | End: 2019-03-14
Admitting: EMERGENCY MEDICINE

## 2019-03-14 VITALS
SYSTOLIC BLOOD PRESSURE: 127 MMHG | OXYGEN SATURATION: 100 % | HEIGHT: 63 IN | WEIGHT: 125.2 LBS | TEMPERATURE: 99.3 F | RESPIRATION RATE: 16 BRPM | DIASTOLIC BLOOD PRESSURE: 76 MMHG | HEART RATE: 119 BPM | BODY MASS INDEX: 22.18 KG/M2

## 2019-03-14 DIAGNOSIS — Z34.90 PREGNANCY, UNSPECIFIED GESTATIONAL AGE: Primary | ICD-10-CM

## 2019-03-14 LAB
B-HCG UR QL: POSITIVE
BACTERIA UR QL AUTO: ABNORMAL /HPF
BILIRUB UR QL STRIP: NEGATIVE
CLARITY UR: CLEAR
COLOR UR: YELLOW
GLUCOSE UR STRIP-MCNC: NEGATIVE MG/DL
HGB UR QL STRIP.AUTO: NEGATIVE
HYALINE CASTS UR QL AUTO: ABNORMAL /LPF
INTERNAL NEGATIVE CONTROL: NEGATIVE
INTERNAL POSITIVE CONTROL: POSITIVE
KETONES UR QL STRIP: NEGATIVE
LEUKOCYTE ESTERASE UR QL STRIP.AUTO: ABNORMAL
Lab: ABNORMAL
NITRITE UR QL STRIP: NEGATIVE
PH UR STRIP.AUTO: 6.5 [PH] (ref 5–8)
PROT UR QL STRIP: NEGATIVE
RBC # UR: ABNORMAL /HPF
REF LAB TEST METHOD: ABNORMAL
SP GR UR STRIP: 1.01 (ref 1–1.03)
SQUAMOUS #/AREA URNS HPF: ABNORMAL /HPF
UROBILINOGEN UR QL STRIP: ABNORMAL
WBC UR QL AUTO: ABNORMAL /HPF

## 2019-03-14 PROCEDURE — 81001 URINALYSIS AUTO W/SCOPE: CPT

## 2019-03-14 PROCEDURE — 81025 URINE PREGNANCY TEST: CPT

## 2019-03-14 PROCEDURE — 99283 EMERGENCY DEPT VISIT LOW MDM: CPT

## 2019-03-15 ENCOUNTER — HOSPITAL ENCOUNTER (EMERGENCY)
Facility: HOSPITAL | Age: 24
Discharge: HOME | End: 2019-03-16
Attending: EMERGENCY MEDICINE | Admitting: EMERGENCY MEDICINE

## 2019-03-15 DIAGNOSIS — Z34.90 PREGNANCY, UNSPECIFIED GESTATIONAL AGE: ICD-10-CM

## 2019-03-15 DIAGNOSIS — N30.90 CYSTITIS: Primary | ICD-10-CM

## 2019-03-15 LAB
FLUAV AG NPH QL: NEGATIVE
FLUBV AG NPH QL IA: NEGATIVE
S PYO AG THROAT QL: NEGATIVE

## 2019-03-15 PROCEDURE — 87880 STREP A ASSAY W/OPTIC: CPT | Performed by: PHYSICIAN ASSISTANT

## 2019-03-15 PROCEDURE — 80053 COMPREHEN METABOLIC PANEL: CPT | Performed by: PHYSICIAN ASSISTANT

## 2019-03-15 PROCEDURE — 87086 URINE CULTURE/COLONY COUNT: CPT | Performed by: PHYSICIAN ASSISTANT

## 2019-03-15 PROCEDURE — 85025 COMPLETE CBC W/AUTO DIFF WBC: CPT | Performed by: PHYSICIAN ASSISTANT

## 2019-03-15 PROCEDURE — 81001 URINALYSIS AUTO W/SCOPE: CPT | Performed by: PHYSICIAN ASSISTANT

## 2019-03-15 PROCEDURE — 87081 CULTURE SCREEN ONLY: CPT | Performed by: PHYSICIAN ASSISTANT

## 2019-03-15 PROCEDURE — 87804 INFLUENZA ASSAY W/OPTIC: CPT | Performed by: PHYSICIAN ASSISTANT

## 2019-03-15 PROCEDURE — 99283 EMERGENCY DEPT VISIT LOW MDM: CPT

## 2019-03-15 RX ORDER — ACETAMINOPHEN 500 MG
1000 TABLET ORAL ONCE
Status: COMPLETED | OUTPATIENT
Start: 2019-03-15 | End: 2019-03-16

## 2019-03-16 VITALS
WEIGHT: 125 LBS | RESPIRATION RATE: 16 BRPM | OXYGEN SATURATION: 100 % | HEART RATE: 92 BPM | DIASTOLIC BLOOD PRESSURE: 72 MMHG | SYSTOLIC BLOOD PRESSURE: 120 MMHG | BODY MASS INDEX: 22.15 KG/M2 | HEIGHT: 63 IN | TEMPERATURE: 98.4 F

## 2019-03-16 LAB
ALBUMIN SERPL-MCNC: 4.6 G/DL (ref 3.5–5)
ALBUMIN/GLOB SERPL: 1.2 G/DL (ref 1.1–2.5)
ALP SERPL-CCNC: 96 U/L (ref 24–120)
ALT SERPL W P-5'-P-CCNC: 19 U/L (ref 0–54)
ANION GAP SERPL CALCULATED.3IONS-SCNC: 14 MMOL/L (ref 4–13)
AST SERPL-CCNC: 22 U/L (ref 7–45)
BACTERIA UR QL AUTO: ABNORMAL /HPF
BASOPHILS # BLD AUTO: 0.07 10*3/MM3 (ref 0–0.2)
BASOPHILS NFR BLD AUTO: 0.4 % (ref 0–2)
BILIRUB SERPL-MCNC: 0.4 MG/DL (ref 0.1–1)
BILIRUB UR QL STRIP: NEGATIVE
BUN BLD-MCNC: 9 MG/DL (ref 5–21)
BUN/CREAT SERPL: 18 (ref 7–25)
CALCIUM SPEC-SCNC: 9 MG/DL (ref 8.4–10.4)
CHLORIDE SERPL-SCNC: 99 MMOL/L (ref 98–110)
CLARITY UR: ABNORMAL
CO2 SERPL-SCNC: 24 MMOL/L (ref 24–31)
COLOR UR: ABNORMAL
CREAT BLD-MCNC: 0.5 MG/DL (ref 0.5–1.4)
DEPRECATED RDW RBC AUTO: 38.5 FL (ref 40–54)
EOSINOPHIL # BLD AUTO: 0 10*3/MM3 (ref 0–0.7)
EOSINOPHIL NFR BLD AUTO: 0 % (ref 0–4)
ERYTHROCYTE [DISTWIDTH] IN BLOOD BY AUTOMATED COUNT: 12.4 % (ref 12–15)
GFR SERPL CREATININE-BSD FRML MDRD: >150 ML/MIN/1.73
GLOBULIN UR ELPH-MCNC: 3.7 GM/DL
GLUCOSE BLD-MCNC: 105 MG/DL (ref 70–100)
GLUCOSE UR STRIP-MCNC: NEGATIVE MG/DL
HCT VFR BLD AUTO: 37.2 % (ref 37–47)
HGB BLD-MCNC: 12.6 G/DL (ref 12–16)
HGB UR QL STRIP.AUTO: NEGATIVE
HYALINE CASTS UR QL AUTO: ABNORMAL /LPF
IMM GRANULOCYTES # BLD AUTO: 0.08 10*3/MM3 (ref 0–0.05)
IMM GRANULOCYTES NFR BLD AUTO: 0.4 % (ref 0–5)
KETONES UR QL STRIP: ABNORMAL
LEUKOCYTE ESTERASE UR QL STRIP.AUTO: ABNORMAL
LYMPHOCYTES # BLD AUTO: 1.3 10*3/MM3 (ref 0.72–4.86)
LYMPHOCYTES NFR BLD AUTO: 7 % (ref 15–45)
MCH RBC QN AUTO: 28.8 PG (ref 28–32)
MCHC RBC AUTO-ENTMCNC: 33.9 G/DL (ref 33–36)
MCV RBC AUTO: 85.1 FL (ref 82–98)
MONOCYTES # BLD AUTO: 1.98 10*3/MM3 (ref 0.19–1.3)
MONOCYTES NFR BLD AUTO: 10.7 % (ref 4–12)
NEUTROPHILS # BLD AUTO: 15.1 10*3/MM3 (ref 1.87–8.4)
NEUTROPHILS NFR BLD AUTO: 81.5 % (ref 39–78)
NITRITE UR QL STRIP: NEGATIVE
NRBC BLD AUTO-RTO: 0 /100 WBC (ref 0–0)
PH UR STRIP.AUTO: 6.5 [PH] (ref 5–8)
PLATELET # BLD AUTO: 267 10*3/MM3 (ref 130–400)
PMV BLD AUTO: 10.1 FL (ref 6–12)
POTASSIUM BLD-SCNC: 3.7 MMOL/L (ref 3.5–5.3)
PROT SERPL-MCNC: 8.3 G/DL (ref 6.3–8.7)
PROT UR QL STRIP: ABNORMAL
RBC # BLD AUTO: 4.37 10*6/MM3 (ref 4.2–5.4)
RBC # UR: ABNORMAL /HPF
REF LAB TEST METHOD: ABNORMAL
SODIUM BLD-SCNC: 137 MMOL/L (ref 135–145)
SP GR UR STRIP: 1.02 (ref 1–1.03)
SQUAMOUS #/AREA URNS HPF: ABNORMAL /HPF
UROBILINOGEN UR QL STRIP: ABNORMAL
WBC NRBC COR # BLD: 18.53 10*3/MM3 (ref 4.8–10.8)
WBC UR QL AUTO: ABNORMAL /HPF

## 2019-03-16 PROCEDURE — 96365 THER/PROPH/DIAG IV INF INIT: CPT

## 2019-03-16 PROCEDURE — 25010000002 CEFTRIAXONE PER 250 MG: Performed by: PHYSICIAN ASSISTANT

## 2019-03-16 RX ORDER — NITROFURANTOIN 25; 75 MG/1; MG/1
100 CAPSULE ORAL 2 TIMES DAILY
Qty: 14 CAPSULE | Refills: 0 | Status: ON HOLD | OUTPATIENT
Start: 2019-03-16 | End: 2019-07-27

## 2019-03-16 RX ADMIN — ACETAMINOPHEN 1000 MG: 500 TABLET ORAL at 00:00

## 2019-03-16 RX ADMIN — SODIUM CHLORIDE 1000 ML: 9 INJECTION, SOLUTION INTRAVENOUS at 00:39

## 2019-03-16 RX ADMIN — CEFTRIAXONE SODIUM 1 G: 1 INJECTION, POWDER, FOR SOLUTION INTRAMUSCULAR; INTRAVENOUS at 00:39

## 2019-03-16 NOTE — ED PROVIDER NOTES
Subjective   History of Present Illness  23-year-old female presents the chief complaint of frequency urgency burning fever and cough and felt she is pregnant yesterday and has intermittent lower abdominal pressure but no vaginal bleeding or discharge.    Review of Systems   All other systems reviewed and are negative.      Past Medical History:   Diagnosis Date   • Gonorrhea     + on 4/16/2018... - on 5/17/2018   • Herpes     HSV1- valtrex prescribed   • Urinary tract infection    • Urogenital trichomoniasis     + on 3/29/2018 - on 5/17/2018       No Known Allergies    Past Surgical History:   Procedure Laterality Date   • DENTAL PROCEDURE     • WISDOM TOOTH EXTRACTION         No family history on file.    Social History     Socioeconomic History   • Marital status: Significant Other     Spouse name: Not on file   • Number of children: Not on file   • Years of education: Not on file   • Highest education level: Not on file   Tobacco Use   • Smoking status: Never Smoker   • Smokeless tobacco: Never Used   Substance and Sexual Activity   • Alcohol use: No   • Drug use: No   • Sexual activity: Defer           Objective   Physical Exam   Constitutional: She is oriented to person, place, and time. She appears well-developed.   HENT:   Head: Normocephalic and atraumatic.   Mouth/Throat: Uvula is midline and oropharynx is clear and moist.   Eyes: EOM are normal. Pupils are equal, round, and reactive to light.   Cardiovascular: Normal rate and normal heart sounds.   Pulmonary/Chest: Effort normal and breath sounds normal. No respiratory distress. She has no wheezes.   Abdominal: Soft. Bowel sounds are normal.   Neurological: She is alert and oriented to person, place, and time.   Skin: Skin is warm and dry. Capillary refill takes less than 2 seconds.   Psychiatric: She has a normal mood and affect. Her behavior is normal.   Nursing note and vitals reviewed.      Procedures           ED Course        Labs Reviewed    URINALYSIS W/ CULTURE IF INDICATED - Abnormal; Notable for the following components:       Result Value    Color, UA Dark Yellow (*)     Appearance, UA Cloudy (*)     Ketones, UA 15 mg/dL (1+) (*)     Protein, UA 30 mg/dL (1+) (*)     Leuk Esterase, UA Large (3+) (*)     All other components within normal limits   COMPREHENSIVE METABOLIC PANEL - Abnormal; Notable for the following components:    Glucose 105 (*)     Anion Gap 14.0 (*)     All other components within normal limits    Narrative:     GFR Normal >60  Chronic Kidney Disease <60  Kidney Failure <15   CBC WITH AUTO DIFFERENTIAL - Abnormal; Notable for the following components:    WBC 18.53 (*)     RDW-SD 38.5 (*)     Neutrophil % 81.5 (*)     Lymphocyte % 7.0 (*)     Neutrophils, Absolute 15.10 (*)     Monocytes, Absolute 1.98 (*)     Immature Grans, Absolute 0.08 (*)     All other components within normal limits   URINALYSIS, MICROSCOPIC ONLY - Abnormal; Notable for the following components:    RBC, UA 3-5 (*)     WBC, UA Too Numerous to Count (*)     Bacteria, UA 1+ (*)     Squamous Epithelial Cells, UA 13-20 (*)     All other components within normal limits   RAPID STREP A SCREEN - Normal   INFLUENZA ANTIGEN, RAPID - Normal    Narrative:     Recommend confirmation of negative results by viral culture or molecular assay.   BETA HEMOLYTIC STREP CULTURE, THROAT   URINE CULTURE   CBC AND DIFFERENTIAL    Narrative:     The following orders were created for panel order CBC & Differential.  Procedure                               Abnormality         Status                     ---------                               -----------         ------                     CBC Auto Differential[[id number]]        Abnormal            Final result                 Please view results for these tests on the individual orders.     No orders to display               MDM  Number of Diagnoses or Management Options  Diagnosis management comments: Cystitis, will dc in stable  condition        Amount and/or Complexity of Data Reviewed  Clinical lab tests: reviewed and ordered  Tests in the radiology section of CPT®: ordered and reviewed  Tests in the medicine section of CPT®: ordered and reviewed  Decide to obtain previous medical records or to obtain history from someone other than the patient: yes    Risk of Complications, Morbidity, and/or Mortality  Presenting problems: moderate  Diagnostic procedures: moderate  Management options: moderate    Patient Progress  Patient progress: stable        Final diagnoses:   Cystitis   Pregnancy, unspecified gestational age            Homero Brice PA-C  03/16/19 0229

## 2019-03-17 LAB — BACTERIA SPEC AEROBE CULT: NORMAL

## 2019-03-18 LAB — BACTERIA SPEC AEROBE CULT: ABNORMAL

## 2019-03-21 LAB
EXTERNAL GONORRHEA SCREEN: POSITIVE
EXTERNAL HEPATITIS B SURFACE ANTIGEN: NEGATIVE
EXTERNAL HEPATITIS B SURFACE ANTIGEN: NEGATIVE
EXTERNAL HEPATITIS C AB: NEGATIVE
EXTERNAL RUBELLA QUALITATIVE: NORMAL
EXTERNAL SYPHILIS RPR SCREEN: NORMAL

## 2019-03-28 NOTE — ED PROVIDER NOTES
Subjective   History of Present Illness  23-year-old female presents with a chief complaint of nausea weakness intermittent lightheadedness abdominal bloating.  She has not had vomiting or diarrhea cough chest pain or shortness of breath.  She has felt this way in the past but only when she was pregnant.  Patient is sexually active and is not using protection or any contraceptives.  Review of Systems   All other systems reviewed and are negative.      Past Medical History:   Diagnosis Date   • Gonorrhea     + on 4/16/2018... - on 5/17/2018   • Herpes     HSV1- valtrex prescribed   • Urinary tract infection    • Urogenital trichomoniasis     + on 3/29/2018 - on 5/17/2018       No Known Allergies    Past Surgical History:   Procedure Laterality Date   • DENTAL PROCEDURE     • WISDOM TOOTH EXTRACTION         History reviewed. No pertinent family history.    Social History     Socioeconomic History   • Marital status: Significant Other     Spouse name: Not on file   • Number of children: Not on file   • Years of education: Not on file   • Highest education level: Not on file   Tobacco Use   • Smoking status: Never Smoker   • Smokeless tobacco: Never Used   Substance and Sexual Activity   • Alcohol use: No   • Drug use: No   • Sexual activity: Defer           Objective   Physical Exam   Constitutional: She is oriented to person, place, and time. She appears well-developed and well-nourished.   HENT:   Head: Normocephalic and atraumatic.   Eyes: EOM are normal. Pupils are equal, round, and reactive to light.   Cardiovascular: Normal rate, regular rhythm and normal heart sounds.   Pulmonary/Chest: Effort normal and breath sounds normal.   Abdominal: Normal appearance and normal aorta.   Neurological: She is alert and oriented to person, place, and time.   Skin: Skin is warm and dry. Capillary refill takes less than 2 seconds.   Psychiatric: She has a normal mood and affect. Her behavior is normal.   Nursing note and vitals  reviewed.      Procedures           ED Course                  MDM  Number of Diagnoses or Management Options  Pregnancy, unspecified gestational age: new and requires workup  Diagnosis management comments: Patient seen in triage.  Her pregnancy test is positive.  She reports that she thought she might be pregnant and would like to leave.  She was here essentially for pregnancy test.  Further evaluation was offered to the patient and she has declined.  Will be discharged in stable condition.  Strong return precautions were given if symptoms change.  No vaginal bleeding or discharge       Amount and/or Complexity of Data Reviewed  Clinical lab tests: reviewed and ordered  Tests in the medicine section of CPT®: ordered and reviewed    Risk of Complications, Morbidity, and/or Mortality  Presenting problems: moderate  Diagnostic procedures: moderate  Management options: moderate    Patient Progress  Patient progress: stable        Final diagnoses:   Pregnancy, unspecified gestational age            Homero Brice PA-C  03/28/19 1039

## 2019-05-17 LAB — EXTERNAL HERPES CULTURE: NORMAL

## 2019-07-27 ENCOUNTER — HOSPITAL ENCOUNTER (OUTPATIENT)
Facility: HOSPITAL | Age: 24
Setting detail: OBSERVATION
Discharge: HOME | End: 2019-07-28
Attending: OBSTETRICS & GYNECOLOGY | Admitting: OBSTETRICS & GYNECOLOGY

## 2019-07-27 ENCOUNTER — HOSPITAL ENCOUNTER (EMERGENCY)
Facility: HOSPITAL | Age: 24
Discharge: STILL ADMITTED OR ADMINISTRATIVE | End: 2019-07-27

## 2019-07-27 VITALS
DIASTOLIC BLOOD PRESSURE: 70 MMHG | TEMPERATURE: 97.2 F | SYSTOLIC BLOOD PRESSURE: 118 MMHG | RESPIRATION RATE: 18 BRPM | HEART RATE: 85 BPM

## 2019-07-27 VITALS
OXYGEN SATURATION: 100 % | DIASTOLIC BLOOD PRESSURE: 67 MMHG | HEART RATE: 82 BPM | WEIGHT: 128 LBS | SYSTOLIC BLOOD PRESSURE: 106 MMHG | RESPIRATION RATE: 18 BRPM | HEIGHT: 62 IN | TEMPERATURE: 98.2 F | BODY MASS INDEX: 23.55 KG/M2

## 2019-07-27 PROBLEM — N93.9 VAGINAL BLEEDING: Status: ACTIVE | Noted: 2019-07-27

## 2019-07-28 ENCOUNTER — APPOINTMENT (OUTPATIENT)
Dept: ULTRASOUND IMAGING | Facility: HOSPITAL | Age: 24
End: 2019-07-28

## 2019-07-28 LAB
AMORPH URATE CRY URNS QL MICRO: ABNORMAL /HPF
BACTERIA UR QL AUTO: ABNORMAL /HPF
BILIRUB UR QL STRIP: NEGATIVE
CLARITY UR: ABNORMAL
COLOR UR: YELLOW
GLUCOSE UR STRIP-MCNC: NEGATIVE MG/DL
HGB UR QL STRIP.AUTO: NEGATIVE
HYALINE CASTS UR QL AUTO: ABNORMAL /LPF
KETONES UR QL STRIP: ABNORMAL
LEUKOCYTE ESTERASE UR QL STRIP.AUTO: ABNORMAL
NITRITE UR QL STRIP: NEGATIVE
PH UR STRIP.AUTO: 6.5 [PH] (ref 5–8)
PROT UR QL STRIP: NEGATIVE
RBC # UR: ABNORMAL /HPF
REF LAB TEST METHOD: ABNORMAL
SP GR UR STRIP: 1.02 (ref 1–1.03)
SQUAMOUS #/AREA URNS HPF: ABNORMAL /HPF
UROBILINOGEN UR QL STRIP: ABNORMAL
WBC UR QL AUTO: ABNORMAL /HPF

## 2019-07-28 PROCEDURE — 81001 URINALYSIS AUTO W/SCOPE: CPT | Performed by: OBSTETRICS & GYNECOLOGY

## 2019-07-28 PROCEDURE — 87086 URINE CULTURE/COLONY COUNT: CPT | Performed by: OBSTETRICS & GYNECOLOGY

## 2019-07-28 PROCEDURE — 87185 SC STD ENZYME DETCJ PER NZM: CPT | Performed by: OBSTETRICS & GYNECOLOGY

## 2019-07-28 PROCEDURE — P9612 CATHETERIZE FOR URINE SPEC: HCPCS

## 2019-07-28 PROCEDURE — 87186 SC STD MICRODIL/AGAR DIL: CPT | Performed by: OBSTETRICS & GYNECOLOGY

## 2019-07-28 PROCEDURE — 87077 CULTURE AEROBIC IDENTIFY: CPT | Performed by: OBSTETRICS & GYNECOLOGY

## 2019-07-28 PROCEDURE — G0463 HOSPITAL OUTPT CLINIC VISIT: HCPCS

## 2019-07-28 PROCEDURE — 87147 CULTURE TYPE IMMUNOLOGIC: CPT | Performed by: OBSTETRICS & GYNECOLOGY

## 2019-07-28 PROCEDURE — G0378 HOSPITAL OBSERVATION PER HR: HCPCS

## 2019-07-28 PROCEDURE — 76815 OB US LIMITED FETUS(S): CPT

## 2019-07-30 LAB
B-LACTAMASE USUAL SUSC ISLT: POSITIVE
BACTERIA SPEC AEROBE CULT: ABNORMAL

## 2019-08-13 ENCOUNTER — HOSPITAL ENCOUNTER (OUTPATIENT)
Facility: HOSPITAL | Age: 24
Discharge: HOME | End: 2019-08-13
Attending: OBSTETRICS & GYNECOLOGY | Admitting: OBSTETRICS & GYNECOLOGY

## 2019-08-13 ENCOUNTER — APPOINTMENT (OUTPATIENT)
Dept: LAB | Facility: HOSPITAL | Age: 24
End: 2019-08-13

## 2019-08-13 ENCOUNTER — TRANSCRIBE ORDERS (OUTPATIENT)
Dept: LAB | Facility: HOSPITAL | Age: 24
End: 2019-08-13

## 2019-08-13 DIAGNOSIS — Z67.41 TYPE O BLOOD, RH NEGATIVE: Primary | ICD-10-CM

## 2019-08-13 LAB
ABO GROUP BLD: NORMAL
BLD GP AB SCN SERPL QL: NEGATIVE
NUMBER OF DOSES: NORMAL
RH BLD: NEGATIVE

## 2019-08-13 PROCEDURE — G0378 HOSPITAL OBSERVATION PER HR: HCPCS

## 2019-08-13 PROCEDURE — 86900 BLOOD TYPING SEROLOGIC ABO: CPT | Performed by: OBSTETRICS & GYNECOLOGY

## 2019-08-13 PROCEDURE — 36415 COLL VENOUS BLD VENIPUNCTURE: CPT | Performed by: OBSTETRICS & GYNECOLOGY

## 2019-08-13 PROCEDURE — 86850 RBC ANTIBODY SCREEN: CPT | Performed by: OBSTETRICS & GYNECOLOGY

## 2019-08-13 PROCEDURE — 96372 THER/PROPH/DIAG INJ SC/IM: CPT

## 2019-08-13 PROCEDURE — 86901 BLOOD TYPING SEROLOGIC RH(D): CPT | Performed by: OBSTETRICS & GYNECOLOGY

## 2019-08-13 PROCEDURE — 25010000003 RHO D IMMUNE GLOBULIN 1500 UNITS SOLUTION PREFILLED SYRINGE: Performed by: OBSTETRICS & GYNECOLOGY

## 2019-08-13 RX ADMIN — HUMAN RHO(D) IMMUNE GLOBULIN 1500 UNITS: 300 INJECTION, SOLUTION INTRAMUSCULAR at 21:47

## 2019-08-14 PROBLEM — Z34.90 PREGNANCY: Status: ACTIVE | Noted: 2019-08-14

## 2019-09-12 ENCOUNTER — HOSPITAL ENCOUNTER (OUTPATIENT)
Facility: HOSPITAL | Age: 24
Setting detail: OBSERVATION
Discharge: HOME | End: 2019-09-12
Attending: OBSTETRICS & GYNECOLOGY | Admitting: OBSTETRICS & GYNECOLOGY

## 2019-09-12 VITALS — OXYGEN SATURATION: 96 % | SYSTOLIC BLOOD PRESSURE: 122 MMHG | HEART RATE: 91 BPM | DIASTOLIC BLOOD PRESSURE: 65 MMHG

## 2019-09-12 LAB
A1 MICROGLOB PLACENTAL VAG QL: NEGATIVE
BACTERIA UR QL AUTO: ABNORMAL /HPF
BILIRUB UR QL STRIP: NEGATIVE
CLARITY UR: ABNORMAL
COLOR UR: ABNORMAL
GLUCOSE UR STRIP-MCNC: NEGATIVE MG/DL
HGB UR QL STRIP.AUTO: NEGATIVE
HYALINE CASTS UR QL AUTO: ABNORMAL /LPF
KETONES UR QL STRIP: ABNORMAL
LEUKOCYTE ESTERASE UR QL STRIP.AUTO: ABNORMAL
MUCOUS THREADS URNS QL MICRO: ABNORMAL /HPF
NITRITE UR QL STRIP: NEGATIVE
PH UR STRIP.AUTO: 6.5 [PH] (ref 5–8)
PROT UR QL STRIP: ABNORMAL
RBC # UR: ABNORMAL /HPF
REF LAB TEST METHOD: ABNORMAL
SP GR UR STRIP: 1.02 (ref 1–1.03)
SQUAMOUS #/AREA URNS HPF: ABNORMAL /HPF
UROBILINOGEN UR QL STRIP: ABNORMAL
WBC UR QL AUTO: ABNORMAL /HPF
YEAST URNS QL MICRO: ABNORMAL /HPF

## 2019-09-12 PROCEDURE — G0378 HOSPITAL OBSERVATION PER HR: HCPCS

## 2019-09-12 PROCEDURE — G0463 HOSPITAL OUTPT CLINIC VISIT: HCPCS

## 2019-09-12 PROCEDURE — 93010 ELECTROCARDIOGRAM REPORT: CPT | Performed by: INTERNAL MEDICINE

## 2019-09-12 PROCEDURE — 84112 EVAL AMNIOTIC FLUID PROTEIN: CPT | Performed by: OBSTETRICS & GYNECOLOGY

## 2019-09-12 PROCEDURE — 81001 URINALYSIS AUTO W/SCOPE: CPT | Performed by: OBSTETRICS & GYNECOLOGY

## 2019-09-12 PROCEDURE — 87086 URINE CULTURE/COLONY COUNT: CPT | Performed by: OBSTETRICS & GYNECOLOGY

## 2019-09-12 PROCEDURE — 51702 INSERT TEMP BLADDER CATH: CPT

## 2019-09-12 PROCEDURE — 93005 ELECTROCARDIOGRAM TRACING: CPT | Performed by: OBSTETRICS & GYNECOLOGY

## 2019-09-13 LAB — BACTERIA SPEC AEROBE CULT: NO GROWTH

## 2019-09-29 NOTE — PROGRESS NOTES
[facility]  INPATIENT PROGRESS NOTE    Patient Name: Dianna Damon  Date of Admission: 11/16/2018  Today's Date: 11/17/18  Length of Stay: 0  Primary Care Physician: Provider, No Known    Subjective   Chief Complaint: shortness of breath     HPI   Currently laying in bed on toco monitor with FHR at 154 bpm.  The patient has multiple family members at the bedside.  She states that she is doing well and has no symptoms at all currently and has never had chest pain just some shortness of breath.  Can go home when ECHO report is available.  Discussed with VIDA Smith.     Review of Systems   Constitutional: Negative for activity change, appetite change, chills, fatigue and fever.   HENT: Negative for hearing loss, nosebleeds, tinnitus and trouble swallowing.    Eyes: Negative for visual disturbance.   Respiratory: Negative for cough, chest tightness, shortness of breath and wheezing.    Cardiovascular: Negative for chest pain, palpitations and leg swelling.   Gastrointestinal: Negative for abdominal distention, abdominal pain, blood in stool, constipation, diarrhea, nausea and vomiting.   Endocrine: Negative for cold intolerance, heat intolerance, polydipsia, polyphagia and polyuria.   Genitourinary: Negative for decreased urine volume, difficulty urinating, dysuria, flank pain, frequency and hematuria.   Musculoskeletal: Negative for arthralgias, joint swelling and myalgias.   Skin: Negative for rash.   Allergic/Immunologic: Negative for immunocompromised state.   Neurological: Negative for dizziness, syncope, weakness, light-headedness and headaches.   Hematological: Negative for adenopathy. Does not bruise/bleed easily.   Psychiatric/Behavioral: Negative for confusion and sleep disturbance. The patient is not nervous/anxious.       All pertinent negatives and positives are as above. All other systems have been reviewed and are negative unless otherwise stated.      Objective    Temp:  [98.1 °F (36.7 °C)-98.2 °F (36.8 °C)] 98.2 °F (36.8 °C)  Heart Rate:  [54-98] 98  Resp:  [16-18] 16  BP: (100-131)/(59-92) 106/62     Physical Exam   Constitutional: She is oriented to person, place, and time. She appears well-developed and well-nourished.   HENT:   Head: Normocephalic and atraumatic.   Eyes: Conjunctivae and EOM are normal. Pupils are equal, round, and reactive to light.   Neck: Neck supple. No JVD present. No thyromegaly present.   Cardiovascular: Normal rate, regular rhythm, normal heart sounds and intact distal pulses. Exam reveals no gallop and no friction rub.   No murmur heard.  Pulmonary/Chest: Effort normal and breath sounds normal. No respiratory distress. She has no wheezes. She has no rales. She exhibits no tenderness.   Abdominal: Soft. Bowel sounds are normal. She exhibits no distension. There is no tenderness. There is no rebound and no guarding.   Musculoskeletal: Normal range of motion. She exhibits no edema, tenderness or deformity.   Lymphadenopathy:     She has no cervical adenopathy.   Neurological: She is alert and oriented to person, place, and time. She displays normal reflexes. No cranial nerve deficit. She exhibits normal muscle tone.   Skin: Skin is warm and dry. No rash noted.   Psychiatric: She has a normal mood and affect. Her behavior is normal. Judgment and thought content normal.   Nursing note and vitals reviewed.    Results Review:  I have reviewed the labs, radiology results, and diagnostic studies.    Laboratory Data:   Results from last 7 days   Lab Units  11/16/18   2138   WBC 10*3/mm3  17.16*   HEMOGLOBIN g/dL  12.7   HEMATOCRIT %  37.1   PLATELETS 10*3/mm3  245     Results from last 7 days   Lab Units  11/16/18   2249   SODIUM mmol/L  138   POTASSIUM mmol/L  4.5   CHLORIDE mmol/L  106   CO2 mmol/L  24.0   BUN mg/dL  14   CREATININE mg/dL  0.58   CALCIUM mg/dL  9.1   BILIRUBIN mg/dL  0.3   ALK PHOS U/L  199*   ALT (SGPT) U/L  <15   AST  (SGOT) U/L  27   GLUCOSE mg/dL  82     Radiology Data:   Imaging Results (last 24 hours)     Procedure Component Value Units Date/Time    US Ob Limited 1 + Fetuses [[id number]] Collected:  11/17/18 0723     Updated:  11/17/18 0727    Narrative:       EXAMINATION: US OB LIMITED 1 + FETUSES- 11/17/2018 7:23 AM CST     HISTORY: CHECK TRACEE FOR FLUIDS.     REPORT: Limited obstetric sonography was performed transabdominally,  comparison is made with the study from 10/10/2018.     A viable IUP is identified with a heart rate of 138 bpm. The fetus is in  a vertex position. The TRACEE measures 10.5 cm. The placenta is seen  anteriorly without evidence of placenta previa.       Impression:       Single viable IUP with a heart rate of 138 bpm, in a vertex  position. The TRACEE measures 10.5 cm.  This report was finalized on 11/17/2018 07:24 by Dr. Jhonny Barragan MD.        I have reviewed the patient's current medications.     Assessment/Plan     Assessment:  1.  Pleuritic chest pain, resolved  2.  Shortness of breath, likely secondary to pregnancy   3.  Anemia, secondary to pregnancy  4.  Intrauterine pregnancy, 37w4d    Plan:  1.  Troponin's negative   2.  2D echocardiogram   3.  Symptoms have resolved    Currently asymptomatic, has been up to the bathroom and did well.  No issues with shortness of breath.  OK to go home ECHO normal.     KIANA Martin   11/17/18   12:37 PM     I personally evaluated and examined the patient in conjunction with KIANA Brand and agree with the assessment, treatment plan, and disposition of the patient as recorded by her. My history, exam, and further recommendations are:     I visited the patient with Sunday in LDR.  She is doing better and no longer has any symptoms of chest pain or sob.   Work up as outlined above.  · Echo: Structurally normal heart - normal size and function of all chambers, no valvular problems.  · Normal LVEF.     ABG is ok w/o evidence of hypoxia  s1 s2 rrr, TCA, pregnant;  she is attached to tocometer  Vitals:    11/17/18 1315   BP: 109/65   Pulse: 92   Resp: 18   Temp:    SpO2: 97%     Agree with Sunday.  Medically appropriate from my standpoint to go home.      Hugo Ruano MD  11/17/18  3:14 PM       no

## 2019-10-14 LAB — EXTERNAL GROUP B STREP ANTIGEN: NEGATIVE

## 2019-10-25 ENCOUNTER — HOSPITAL ENCOUNTER (OUTPATIENT)
Facility: HOSPITAL | Age: 24
Setting detail: OBSERVATION
Discharge: HOME | End: 2019-10-25
Attending: OBSTETRICS & GYNECOLOGY | Admitting: OBSTETRICS & GYNECOLOGY

## 2019-10-25 VITALS
BODY MASS INDEX: 24.84 KG/M2 | SYSTOLIC BLOOD PRESSURE: 113 MMHG | HEIGHT: 62 IN | WEIGHT: 135 LBS | TEMPERATURE: 98.5 F | DIASTOLIC BLOOD PRESSURE: 61 MMHG | OXYGEN SATURATION: 98 % | HEART RATE: 107 BPM | RESPIRATION RATE: 18 BRPM

## 2019-10-25 PROCEDURE — G0378 HOSPITAL OBSERVATION PER HR: HCPCS

## 2019-10-25 PROCEDURE — G0463 HOSPITAL OUTPT CLINIC VISIT: HCPCS

## 2019-11-12 ENCOUNTER — ANESTHESIA (OUTPATIENT)
Dept: LABOR AND DELIVERY | Facility: HOSPITAL | Age: 24
End: 2019-11-12

## 2019-11-12 ENCOUNTER — HOSPITAL ENCOUNTER (INPATIENT)
Facility: HOSPITAL | Age: 24
LOS: 2 days | Discharge: HOME | End: 2019-11-14
Attending: OBSTETRICS & GYNECOLOGY | Admitting: OBSTETRICS & GYNECOLOGY

## 2019-11-12 ENCOUNTER — HOSPITAL ENCOUNTER (OUTPATIENT)
Facility: HOSPITAL | Age: 24
Setting detail: OBSERVATION
Discharge: HOME | End: 2019-11-12
Attending: OBSTETRICS & GYNECOLOGY | Admitting: OBSTETRICS & GYNECOLOGY

## 2019-11-12 ENCOUNTER — ANESTHESIA EVENT (OUTPATIENT)
Dept: LABOR AND DELIVERY | Facility: HOSPITAL | Age: 24
End: 2019-11-12

## 2019-11-12 VITALS
WEIGHT: 137 LBS | SYSTOLIC BLOOD PRESSURE: 124 MMHG | HEIGHT: 62 IN | DIASTOLIC BLOOD PRESSURE: 73 MMHG | TEMPERATURE: 97.7 F | BODY MASS INDEX: 25.21 KG/M2 | HEART RATE: 90 BPM | RESPIRATION RATE: 20 BRPM

## 2019-11-12 PROBLEM — Z34.90 SUPERVISION OF NORMAL PREGNANCY: Status: RESOLVED | Noted: 2019-11-12 | Resolved: 2019-11-12

## 2019-11-12 PROBLEM — Z37.9 NORMAL LABOR: Status: RESOLVED | Noted: 2019-11-12 | Resolved: 2019-11-12

## 2019-11-12 PROBLEM — Z37.9 NORMAL LABOR: Status: ACTIVE | Noted: 2019-11-12

## 2019-11-12 PROBLEM — Z34.90 PREGNANCY: Status: RESOLVED | Noted: 2019-08-14 | Resolved: 2019-11-12

## 2019-11-12 PROBLEM — Z34.90 SUPERVISION OF NORMAL PREGNANCY: Status: ACTIVE | Noted: 2019-11-12

## 2019-11-12 PROBLEM — N93.9 VAGINAL BLEEDING: Status: RESOLVED | Noted: 2019-07-27 | Resolved: 2019-11-12

## 2019-11-12 LAB
A1 MICROGLOB PLACENTAL VAG QL: POSITIVE
ABO GROUP BLD: NORMAL
BASOPHILS # BLD AUTO: 0.08 10*3/MM3 (ref 0–0.2)
BASOPHILS NFR BLD AUTO: 0.4 % (ref 0–1.5)
BLD GP AB SCN SERPL QL: POSITIVE
DEPRECATED RDW RBC AUTO: 41.6 FL (ref 37–54)
EOSINOPHIL # BLD AUTO: 0.28 10*3/MM3 (ref 0–0.4)
EOSINOPHIL NFR BLD AUTO: 1.3 % (ref 0.3–6.2)
ERYTHROCYTE [DISTWIDTH] IN BLOOD BY AUTOMATED COUNT: 13.8 % (ref 12.3–15.4)
HCT VFR BLD AUTO: 32.4 % (ref 34–46.6)
HGB BLD-MCNC: 10.8 G/DL (ref 12–15.9)
IMM GRANULOCYTES # BLD AUTO: 0.09 10*3/MM3 (ref 0–0.05)
IMM GRANULOCYTES NFR BLD AUTO: 0.4 % (ref 0–0.5)
LYMPHOCYTES # BLD AUTO: 2.02 10*3/MM3 (ref 0.7–3.1)
LYMPHOCYTES NFR BLD AUTO: 9.7 % (ref 19.6–45.3)
MCH RBC QN AUTO: 27.7 PG (ref 26.6–33)
MCHC RBC AUTO-ENTMCNC: 33.3 G/DL (ref 31.5–35.7)
MCV RBC AUTO: 83.1 FL (ref 79–97)
MONOCYTES # BLD AUTO: 1.2 10*3/MM3 (ref 0.1–0.9)
MONOCYTES NFR BLD AUTO: 5.7 % (ref 5–12)
NEUTROPHILS # BLD AUTO: 17.2 10*3/MM3 (ref 1.7–7)
NEUTROPHILS NFR BLD AUTO: 82.5 % (ref 42.7–76)
NRBC BLD AUTO-RTO: 0 /100 WBC (ref 0–0.2)
PLATELET # BLD AUTO: 383 10*3/MM3 (ref 140–450)
PMV BLD AUTO: 9.9 FL (ref 6–12)
RBC # BLD AUTO: 3.9 10*6/MM3 (ref 3.77–5.28)
RESIDUAL RHIG DETECTED: NORMAL
RH BLD: NEGATIVE
T&S EXPIRATION DATE: NORMAL
WBC NRBC COR # BLD: 20.87 10*3/MM3 (ref 3.4–10.8)

## 2019-11-12 PROCEDURE — 25010000002 FENTANYL CITRATE (PF) 250 MCG/5ML SOLUTION: Performed by: NURSE ANESTHETIST, CERTIFIED REGISTERED

## 2019-11-12 PROCEDURE — 85027 COMPLETE CBC AUTOMATED: CPT | Performed by: OBSTETRICS & GYNECOLOGY

## 2019-11-12 PROCEDURE — 88307 TISSUE EXAM BY PATHOLOGIST: CPT | Performed by: OBSTETRICS & GYNECOLOGY

## 2019-11-12 PROCEDURE — 86901 BLOOD TYPING SEROLOGIC RH(D): CPT | Performed by: OBSTETRICS & GYNECOLOGY

## 2019-11-12 PROCEDURE — 96360 HYDRATION IV INFUSION INIT: CPT

## 2019-11-12 PROCEDURE — C1755 CATHETER, INTRASPINAL: HCPCS | Performed by: NURSE ANESTHETIST, CERTIFIED REGISTERED

## 2019-11-12 PROCEDURE — 84112 EVAL AMNIOTIC FLUID PROTEIN: CPT | Performed by: OBSTETRICS & GYNECOLOGY

## 2019-11-12 PROCEDURE — G0463 HOSPITAL OUTPT CLINIC VISIT: HCPCS

## 2019-11-12 PROCEDURE — 36415 COLL VENOUS BLD VENIPUNCTURE: CPT | Performed by: OBSTETRICS & GYNECOLOGY

## 2019-11-12 PROCEDURE — 86900 BLOOD TYPING SEROLOGIC ABO: CPT | Performed by: OBSTETRICS & GYNECOLOGY

## 2019-11-12 PROCEDURE — 25010000002 ROPIVACAINE PER 1 MG: Performed by: NURSE ANESTHETIST, CERTIFIED REGISTERED

## 2019-11-12 PROCEDURE — G0378 HOSPITAL OBSERVATION PER HR: HCPCS

## 2019-11-12 PROCEDURE — 51703 INSERT BLADDER CATH COMPLEX: CPT

## 2019-11-12 PROCEDURE — 86870 RBC ANTIBODY IDENTIFICATION: CPT | Performed by: OBSTETRICS & GYNECOLOGY

## 2019-11-12 PROCEDURE — 86850 RBC ANTIBODY SCREEN: CPT | Performed by: OBSTETRICS & GYNECOLOGY

## 2019-11-12 RX ORDER — SODIUM CHLORIDE, SODIUM LACTATE, POTASSIUM CHLORIDE, CALCIUM CHLORIDE 600; 310; 30; 20 MG/100ML; MG/100ML; MG/100ML; MG/100ML
125 INJECTION, SOLUTION INTRAVENOUS CONTINUOUS
Status: DISCONTINUED | OUTPATIENT
Start: 2019-11-12 | End: 2019-11-13

## 2019-11-12 RX ORDER — OXYTOCIN/0.9 % SODIUM CHLORIDE 30/500 ML
250 PLASTIC BAG, INJECTION (ML) INTRAVENOUS CONTINUOUS
Status: DISPENSED | OUTPATIENT
Start: 2019-11-12 | End: 2019-11-12

## 2019-11-12 RX ORDER — SODIUM CHLORIDE, SODIUM LACTATE, POTASSIUM CHLORIDE, CALCIUM CHLORIDE 600; 310; 30; 20 MG/100ML; MG/100ML; MG/100ML; MG/100ML
999 INJECTION, SOLUTION INTRAVENOUS CONTINUOUS
Status: DISCONTINUED | OUTPATIENT
Start: 2019-11-12 | End: 2019-11-12 | Stop reason: HOSPADM

## 2019-11-12 RX ORDER — LIDOCAINE HYDROCHLORIDE AND EPINEPHRINE 15; 5 MG/ML; UG/ML
INJECTION, SOLUTION EPIDURAL AS NEEDED
Status: DISCONTINUED | OUTPATIENT
Start: 2019-11-12 | End: 2019-11-13 | Stop reason: SURG

## 2019-11-12 RX ORDER — SODIUM CHLORIDE 0.9 % (FLUSH) 0.9 %
10 SYRINGE (ML) INJECTION AS NEEDED
Status: DISCONTINUED | OUTPATIENT
Start: 2019-11-12 | End: 2019-11-12 | Stop reason: HOSPADM

## 2019-11-12 RX ORDER — ONDANSETRON 2 MG/ML
4 INJECTION INTRAMUSCULAR; INTRAVENOUS EVERY 6 HOURS PRN
Status: DISCONTINUED | OUTPATIENT
Start: 2019-11-12 | End: 2019-11-12 | Stop reason: HOSPADM

## 2019-11-12 RX ORDER — PROMETHAZINE HYDROCHLORIDE 25 MG/1
12.5 TABLET ORAL EVERY 6 HOURS PRN
Status: DISCONTINUED | OUTPATIENT
Start: 2019-11-12 | End: 2019-11-12 | Stop reason: HOSPADM

## 2019-11-12 RX ORDER — METHYLERGONOVINE MALEATE 0.2 MG/ML
200 INJECTION INTRAVENOUS ONCE AS NEEDED
Status: DISCONTINUED | OUTPATIENT
Start: 2019-11-12 | End: 2019-11-12 | Stop reason: HOSPADM

## 2019-11-12 RX ORDER — TERBUTALINE SULFATE 1 MG/ML
0.25 INJECTION, SOLUTION SUBCUTANEOUS AS NEEDED
Status: DISCONTINUED | OUTPATIENT
Start: 2019-11-12 | End: 2019-11-12 | Stop reason: HOSPADM

## 2019-11-12 RX ORDER — SODIUM CHLORIDE 0.9 % (FLUSH) 0.9 %
3 SYRINGE (ML) INJECTION EVERY 12 HOURS SCHEDULED
Status: DISCONTINUED | OUTPATIENT
Start: 2019-11-12 | End: 2019-11-12 | Stop reason: HOSPADM

## 2019-11-12 RX ORDER — LIDOCAINE HYDROCHLORIDE 10 MG/ML
5 INJECTION, SOLUTION EPIDURAL; INFILTRATION; INTRACAUDAL; PERINEURAL AS NEEDED
Status: DISCONTINUED | OUTPATIENT
Start: 2019-11-12 | End: 2019-11-12 | Stop reason: HOSPADM

## 2019-11-12 RX ORDER — ONDANSETRON 4 MG/1
4 TABLET, FILM COATED ORAL EVERY 6 HOURS PRN
Status: DISCONTINUED | OUTPATIENT
Start: 2019-11-12 | End: 2019-11-12 | Stop reason: HOSPADM

## 2019-11-12 RX ORDER — BUTORPHANOL TARTRATE 1 MG/ML
2 INJECTION, SOLUTION INTRAMUSCULAR; INTRAVENOUS
Status: DISCONTINUED | OUTPATIENT
Start: 2019-11-12 | End: 2019-11-12 | Stop reason: HOSPADM

## 2019-11-12 RX ORDER — PROMETHAZINE HYDROCHLORIDE 12.5 MG/1
12.5 SUPPOSITORY RECTAL EVERY 6 HOURS PRN
Status: DISCONTINUED | OUTPATIENT
Start: 2019-11-12 | End: 2019-11-12 | Stop reason: HOSPADM

## 2019-11-12 RX ORDER — OXYTOCIN/0.9 % SODIUM CHLORIDE 30/500 ML
999 PLASTIC BAG, INJECTION (ML) INTRAVENOUS ONCE
Status: COMPLETED | OUTPATIENT
Start: 2019-11-12 | End: 2019-11-12

## 2019-11-12 RX ORDER — BUTORPHANOL TARTRATE 1 MG/ML
1 INJECTION, SOLUTION INTRAMUSCULAR; INTRAVENOUS
Status: DISCONTINUED | OUTPATIENT
Start: 2019-11-12 | End: 2019-11-12 | Stop reason: HOSPADM

## 2019-11-12 RX ORDER — PROMETHAZINE HYDROCHLORIDE 25 MG/ML
12.5 INJECTION, SOLUTION INTRAMUSCULAR; INTRAVENOUS EVERY 6 HOURS PRN
Status: DISCONTINUED | OUTPATIENT
Start: 2019-11-12 | End: 2019-11-12 | Stop reason: HOSPADM

## 2019-11-12 RX ORDER — EPHEDRINE SULFATE 50 MG/ML
10 INJECTION, SOLUTION INTRAVENOUS
Status: DISCONTINUED | OUTPATIENT
Start: 2019-11-12 | End: 2019-11-12 | Stop reason: HOSPADM

## 2019-11-12 RX ORDER — CARBOPROST TROMETHAMINE 250 UG/ML
250 INJECTION, SOLUTION INTRAMUSCULAR AS NEEDED
Status: DISCONTINUED | OUTPATIENT
Start: 2019-11-12 | End: 2019-11-12 | Stop reason: HOSPADM

## 2019-11-12 RX ORDER — IBUPROFEN 800 MG/1
800 TABLET ORAL EVERY 8 HOURS PRN
Status: DISCONTINUED | OUTPATIENT
Start: 2019-11-12 | End: 2019-11-12 | Stop reason: HOSPADM

## 2019-11-12 RX ORDER — FENTANYL CITRATE 50 UG/ML
INJECTION, SOLUTION INTRAMUSCULAR; INTRAVENOUS AS NEEDED
Status: DISCONTINUED | OUTPATIENT
Start: 2019-11-12 | End: 2019-11-13 | Stop reason: SURG

## 2019-11-12 RX ORDER — MISOPROSTOL 200 UG/1
800 TABLET ORAL AS NEEDED
Status: DISCONTINUED | OUTPATIENT
Start: 2019-11-12 | End: 2019-11-12 | Stop reason: HOSPADM

## 2019-11-12 RX ORDER — OXYTOCIN/0.9 % SODIUM CHLORIDE 30/500 ML
125 PLASTIC BAG, INJECTION (ML) INTRAVENOUS CONTINUOUS PRN
Status: COMPLETED | OUTPATIENT
Start: 2019-11-12 | End: 2019-11-12

## 2019-11-12 RX ADMIN — OXYTOCIN-SODIUM CHLORIDE 0.9% IV SOLN 30 UNIT/500ML 999 ML/HR: 30-0.9/5 SOLUTION at 21:39

## 2019-11-12 RX ADMIN — FENTANYL CITRATE 200 MCG: 50 INJECTION INTRAMUSCULAR; INTRAVENOUS at 18:09

## 2019-11-12 RX ADMIN — SODIUM CHLORIDE, POTASSIUM CHLORIDE, SODIUM LACTATE AND CALCIUM CHLORIDE 1000 ML: 600; 310; 30; 20 INJECTION, SOLUTION INTRAVENOUS at 17:51

## 2019-11-12 RX ADMIN — SODIUM CHLORIDE, POTASSIUM CHLORIDE, SODIUM LACTATE AND CALCIUM CHLORIDE 999 ML/HR: 600; 310; 30; 20 INJECTION, SOLUTION INTRAVENOUS at 09:40

## 2019-11-12 RX ADMIN — FENTANYL CITRATE 50 MCG: 50 INJECTION INTRAMUSCULAR; INTRAVENOUS at 18:03

## 2019-11-12 RX ADMIN — SODIUM CHLORIDE, POTASSIUM CHLORIDE, SODIUM LACTATE AND CALCIUM CHLORIDE 125 ML/HR: 600; 310; 30; 20 INJECTION, SOLUTION INTRAVENOUS at 18:24

## 2019-11-12 RX ADMIN — SODIUM CHLORIDE, POTASSIUM CHLORIDE, SODIUM LACTATE AND CALCIUM CHLORIDE 1000 ML: 600; 310; 30; 20 INJECTION, SOLUTION INTRAVENOUS at 17:30

## 2019-11-12 RX ADMIN — LIDOCAINE HYDROCHLORIDE AND EPINEPHRINE 3 ML: 15; 5 INJECTION, SOLUTION EPIDURAL at 17:58

## 2019-11-12 RX ADMIN — ROPIVACAINE HYDROCHLORIDE 5 ML: 2 INJECTION, SOLUTION EPIDURAL; INFILTRATION at 18:08

## 2019-11-12 RX ADMIN — OXYTOCIN-SODIUM CHLORIDE 0.9% IV SOLN 30 UNIT/500ML 999 ML/HR: 30-0.9/5 SOLUTION at 21:16

## 2019-11-12 RX ADMIN — ROPIVACAINE HYDROCHLORIDE 5 ML: 2 INJECTION, SOLUTION EPIDURAL; INFILTRATION at 18:03

## 2019-11-13 LAB
ABO GROUP BLD: NORMAL
BLD GP AB SCN SERPL QL: NEGATIVE
FETAL BLEED: NEGATIVE
HCT VFR BLD AUTO: 30.9 % (ref 34–46.6)
HGB BLD-MCNC: 10.3 G/DL (ref 12–15.9)
NUMBER OF DOSES: NORMAL
RH BLD: NEGATIVE

## 2019-11-13 PROCEDURE — 85461 HEMOGLOBIN FETAL: CPT | Performed by: OBSTETRICS & GYNECOLOGY

## 2019-11-13 PROCEDURE — 85014 HEMATOCRIT: CPT | Performed by: OBSTETRICS & GYNECOLOGY

## 2019-11-13 PROCEDURE — 86850 RBC ANTIBODY SCREEN: CPT

## 2019-11-13 PROCEDURE — 85018 HEMOGLOBIN: CPT | Performed by: OBSTETRICS & GYNECOLOGY

## 2019-11-13 PROCEDURE — 86900 BLOOD TYPING SEROLOGIC ABO: CPT | Performed by: OBSTETRICS & GYNECOLOGY

## 2019-11-13 PROCEDURE — 25010000003 RHO D IMMUNE GLOBULIN 1500 UNITS SOLUTION PREFILLED SYRINGE: Performed by: OBSTETRICS & GYNECOLOGY

## 2019-11-13 PROCEDURE — 86901 BLOOD TYPING SEROLOGIC RH(D): CPT | Performed by: OBSTETRICS & GYNECOLOGY

## 2019-11-13 RX ORDER — CARBOPROST TROMETHAMINE 250 UG/ML
250 INJECTION, SOLUTION INTRAMUSCULAR ONCE AS NEEDED
Status: DISCONTINUED | OUTPATIENT
Start: 2019-11-13 | End: 2019-11-14 | Stop reason: HOSPADM

## 2019-11-13 RX ORDER — MISOPROSTOL 200 UG/1
600 TABLET ORAL ONCE AS NEEDED
Status: DISCONTINUED | OUTPATIENT
Start: 2019-11-13 | End: 2019-11-14 | Stop reason: HOSPADM

## 2019-11-13 RX ORDER — OXYTOCIN/0.9 % SODIUM CHLORIDE 30/500 ML
125 PLASTIC BAG, INJECTION (ML) INTRAVENOUS CONTINUOUS PRN
Status: DISCONTINUED | OUTPATIENT
Start: 2019-11-13 | End: 2019-11-14 | Stop reason: HOSPADM

## 2019-11-13 RX ORDER — OXYTOCIN/0.9 % SODIUM CHLORIDE 30/500 ML
250 PLASTIC BAG, INJECTION (ML) INTRAVENOUS CONTINUOUS
Status: ACTIVE | OUTPATIENT
Start: 2019-11-13 | End: 2019-11-13

## 2019-11-13 RX ORDER — PRENATAL VIT/IRON FUM/FOLIC AC 27MG-0.8MG
1 TABLET ORAL DAILY
Status: DISCONTINUED | OUTPATIENT
Start: 2019-11-13 | End: 2019-11-14 | Stop reason: HOSPADM

## 2019-11-13 RX ORDER — OXYCODONE HYDROCHLORIDE AND ACETAMINOPHEN 5; 325 MG/1; MG/1
1 TABLET ORAL EVERY 6 HOURS PRN
Status: DISCONTINUED | OUTPATIENT
Start: 2019-11-13 | End: 2019-11-14 | Stop reason: HOSPADM

## 2019-11-13 RX ORDER — ONDANSETRON 4 MG/1
4 TABLET, FILM COATED ORAL EVERY 6 HOURS PRN
Status: DISCONTINUED | OUTPATIENT
Start: 2019-11-13 | End: 2019-11-14 | Stop reason: HOSPADM

## 2019-11-13 RX ORDER — SODIUM CHLORIDE, SODIUM LACTATE, POTASSIUM CHLORIDE, CALCIUM CHLORIDE 600; 310; 30; 20 MG/100ML; MG/100ML; MG/100ML; MG/100ML
125 INJECTION, SOLUTION INTRAVENOUS CONTINUOUS
Status: DISCONTINUED | OUTPATIENT
Start: 2019-11-13 | End: 2019-11-14 | Stop reason: HOSPADM

## 2019-11-13 RX ORDER — ONDANSETRON 2 MG/ML
4 INJECTION INTRAMUSCULAR; INTRAVENOUS EVERY 6 HOURS PRN
Status: DISCONTINUED | OUTPATIENT
Start: 2019-11-13 | End: 2019-11-14 | Stop reason: HOSPADM

## 2019-11-13 RX ORDER — OXYTOCIN/0.9 % SODIUM CHLORIDE 30/500 ML
999 PLASTIC BAG, INJECTION (ML) INTRAVENOUS ONCE
Status: DISCONTINUED | OUTPATIENT
Start: 2019-11-13 | End: 2019-11-14 | Stop reason: HOSPADM

## 2019-11-13 RX ORDER — SODIUM CHLORIDE 0.9 % (FLUSH) 0.9 %
1-10 SYRINGE (ML) INJECTION AS NEEDED
Status: DISCONTINUED | OUTPATIENT
Start: 2019-11-13 | End: 2019-11-14 | Stop reason: HOSPADM

## 2019-11-13 RX ORDER — IBUPROFEN 800 MG/1
800 TABLET ORAL EVERY 8 HOURS PRN
Status: DISCONTINUED | OUTPATIENT
Start: 2019-11-13 | End: 2019-11-14 | Stop reason: HOSPADM

## 2019-11-13 RX ORDER — PROMETHAZINE HYDROCHLORIDE 25 MG/ML
12.5 INJECTION, SOLUTION INTRAMUSCULAR; INTRAVENOUS EVERY 6 HOURS PRN
Status: DISCONTINUED | OUTPATIENT
Start: 2019-11-13 | End: 2019-11-14 | Stop reason: HOSPADM

## 2019-11-13 RX ORDER — BISACODYL 10 MG
10 SUPPOSITORY, RECTAL RECTAL DAILY PRN
Status: DISCONTINUED | OUTPATIENT
Start: 2019-11-13 | End: 2019-11-14 | Stop reason: HOSPADM

## 2019-11-13 RX ORDER — DOCUSATE SODIUM 100 MG/1
100 CAPSULE, LIQUID FILLED ORAL 2 TIMES DAILY PRN
Status: DISCONTINUED | OUTPATIENT
Start: 2019-11-13 | End: 2019-11-14 | Stop reason: HOSPADM

## 2019-11-13 RX ORDER — PROMETHAZINE HYDROCHLORIDE 25 MG/1
25 TABLET ORAL EVERY 6 HOURS PRN
Status: DISCONTINUED | OUTPATIENT
Start: 2019-11-13 | End: 2019-11-14 | Stop reason: HOSPADM

## 2019-11-13 RX ORDER — PROMETHAZINE HYDROCHLORIDE 12.5 MG/1
12.5 SUPPOSITORY RECTAL EVERY 6 HOURS PRN
Status: DISCONTINUED | OUTPATIENT
Start: 2019-11-13 | End: 2019-11-14 | Stop reason: HOSPADM

## 2019-11-13 RX ORDER — METHYLERGONOVINE MALEATE 0.2 MG/ML
200 INJECTION INTRAVENOUS ONCE AS NEEDED
Status: DISCONTINUED | OUTPATIENT
Start: 2019-11-13 | End: 2019-11-14 | Stop reason: HOSPADM

## 2019-11-13 RX ADMIN — SODIUM CHLORIDE, PRESERVATIVE FREE 3 ML: 5 INJECTION INTRAVENOUS at 21:31

## 2019-11-13 RX ADMIN — HUMAN RHO(D) IMMUNE GLOBULIN 1500 UNITS: 300 INJECTION, SOLUTION INTRAMUSCULAR at 16:35

## 2019-11-13 RX ADMIN — IBUPROFEN 800 MG: 800 TABLET, FILM COATED ORAL at 16:48

## 2019-11-13 RX ADMIN — PRENATAL VIT W/ FE FUMARATE-FA TAB 27-0.8 MG 1 TABLET: 27-0.8 TAB at 09:59

## 2019-11-13 RX ADMIN — OXYCODONE HYDROCHLORIDE AND ACETAMINOPHEN 1 TABLET: 5; 325 TABLET ORAL at 22:13

## 2019-11-13 RX ADMIN — OXYCODONE HYDROCHLORIDE AND ACETAMINOPHEN 1 TABLET: 5; 325 TABLET ORAL at 04:21

## 2019-11-13 NOTE — PLAN OF CARE
"Problem: Patient Care Overview  Goal: Plan of Care Review  Outcome: Ongoing (interventions implemented as appropriate)   19   Coping/Psychosocial   Plan of Care Reviewed With patient;family;mother;significant other   Plan of Care Review   Progress improving   OTHER   Outcome Summary Successful vaginally delivery of  Syed walter, on 19 @ 2102. No LACs or Repairs. Uterus has been midline 1 above the umbilicus and firm with scant to light output, no clots noted. Uterus is slighty displaced to the right per last uterine fundal check. patient encouraged to ambulate to BR to void.      Goal: Individualization and Mutuality  Outcome: Ongoing (interventions implemented as appropriate)   19   Individualization   Patient Specific Preferences Patient prefers to formula feed her  for now.    Patient Specific Goals (Include Timeframe) Patient states her goal is to keep her pain at or under a level \"1\", out of \"10\" pain scale, by discharge   Patient Specific Interventions Uterine fundal checks q15min, pain meds offered PRN, support and encouragement provided, assistance with hygiene, questions encouraged and answered   Mutuality/Individual Preferences   What Anxieties, Fears, Concerns, or Questions Do You Have About Your Care? Patient denies any at this time   What Information Would Help Us Give You More Personalized Care? Patient would like to be kept updated frequently on her POC   How Would You and/or Your Support Person Like to Participate in Your Care? FOB @ BS & involved in newborns life per pt report     Goal: Discharge Needs Assessment  Outcome: Ongoing (interventions implemented as appropriate)   19   Discharge Needs Assessment   Concerns to be Addressed no discharge needs identified     Goal: Interprofessional Rounds/Family Conf  Outcome: Ongoing (interventions implemented as appropriate)   19   Interdisciplinary Rounds/Family Conf   Participants " family;nursing;patient;physician       Problem: Anesthesia/Analgesia, Neuraxial (Obstetrics)  Goal: Signs and Symptoms of Listed Potential Problems Will be Absent, Minimized or Managed (Anesthesia/Analgesia, Neuraxial)  Outcome: Ongoing (interventions implemented as appropriate)   11/12/19 4463   Goal/Outcome Evaluation   Problems Assessed (Neuraxial Anesthesia/Analgesia, OB) all   Problems Present (Neuraxial Anesth OB) none

## 2019-11-13 NOTE — PLAN OF CARE
Problem: Patient Care Overview  Goal: Plan of Care Review  Outcome: Ongoing (interventions implemented as appropriate)   11/13/19 0257   Coping/Psychosocial   Plan of Care Reviewed With patient   Plan of Care Review   Progress improving   OTHER   Outcome Summary vss, voiding, ff 1u to the right of umbilicus, frequent voiding encouraged, no lacs, formula feeding,      Goal: Individualization and Mutuality  Outcome: Ongoing (interventions implemented as appropriate)    Goal: Discharge Needs Assessment  Outcome: Ongoing (interventions implemented as appropriate)    Goal: Interprofessional Rounds/Family Conf  Outcome: Ongoing (interventions implemented as appropriate)      Problem: Anesthesia/Analgesia, Neuraxial (Obstetrics)  Goal: Signs and Symptoms of Listed Potential Problems Will be Absent, Minimized or Managed (Anesthesia/Analgesia, Neuraxial)  Outcome: Ongoing (interventions implemented as appropriate)      Problem: Postpartum (Vaginal Delivery) (Adult,Obstetrics,Pediatric)  Goal: Signs and Symptoms of Listed Potential Problems Will be Absent, Minimized or Managed (Postpartum)  Outcome: Ongoing (interventions implemented as appropriate)

## 2019-11-13 NOTE — PLAN OF CARE
Problem: Patient Care Overview  Goal: Plan of Care Review  Outcome: Ongoing (interventions implemented as appropriate)   11/13/19 0323   Coping/Psychosocial   Plan of Care Reviewed With patient   Plan of Care Review   Progress improving   OTHER   Outcome Summary VSS. FF 1U to the right of umbilicus scant to light bleeding. Voiding. Ambulating in room.     Goal: Individualization and Mutuality  Outcome: Ongoing (interventions implemented as appropriate)    Goal: Discharge Needs Assessment  Outcome: Ongoing (interventions implemented as appropriate)    Goal: Interprofessional Rounds/Family Conf  Outcome: Ongoing (interventions implemented as appropriate)      Problem: Postpartum (Vaginal Delivery) (Adult,Obstetrics,Pediatric)  Goal: Signs and Symptoms of Listed Potential Problems Will be Absent, Minimized or Managed (Postpartum)  Outcome: Ongoing (interventions implemented as appropriate)

## 2019-11-13 NOTE — PLAN OF CARE
Problem: Patient Care Overview  Goal: Plan of Care Review  Outcome: Ongoing (interventions implemented as appropriate)   11/12/19 1839   Coping/Psychosocial   Plan of Care Reviewed With patient   Plan of Care Review   Progress improving     Goal: Individualization and Mutuality  Outcome: Ongoing (interventions implemented as appropriate)   11/12/19 1839   Individualization   Patient Specific Preferences bottlefeeding   Patient Specific Goals (Include Timeframe) uncomplicated delivery   Patient Specific Interventions pt involvement in care     Goal: Discharge Needs Assessment  Outcome: Ongoing (interventions implemented as appropriate)   11/12/19 1839   Discharge Needs Assessment   Concerns to be Addressed denies needs/concerns at this time       Problem: Labor (Cervical Ripen, Induct, Augment) (Adult,Obstetrics,Pediatric)  Goal: Signs and Symptoms of Listed Potential Problems Will be Absent, Minimized or Managed (Labor)  Outcome: Ongoing (interventions implemented as appropriate)   11/12/19 1839   Goal/Outcome Evaluation   Problems Assessed (Labor) all   Problems Present (Labor) none

## 2019-11-13 NOTE — L&D DELIVERY NOTE
[facility]  Vaginal Delivery Note    Delivery details     Delivery: Vaginal, Spontaneous     YOB: 2018    Time of Birth: 9:02 PM      Anesthesia: Epidural     Delivering clinician: Gretel Lei    Forceps?   No   Vacuum? No    Shoulder dystocia present: No      Delivery narrative:      Infant details    Findings: male  infant     Infant observations: Weight: 3005 g (6 lb 10 oz)   Length: 18  in   Apgars: 8   @ 1 minute /    9   @ 5 minutes     Placenta, Cord, and Fluid    Placenta delivered  Spontaneous  at   2019  9:16 PM     Cord: 3 vessels  present.   Nuchal Cord?  yes; Number of nuchal loops present:  1    Cord blood obtained: Yes      Repair    Episiotomy: None    Lacerations: No   Estimated Blood Loss: Est. Blood Loss (mL): 100 mL(Filed from Delivery Summary) (19)   Suture used for repair: n/a       Complications  none    Disposition  Mother to Mother Baby/Postpartum  in stable condition currently.  Baby to remains with mom  in stable condition currently.      Gretel Lei MD  19  8:33 AM

## 2019-11-13 NOTE — ANESTHESIA POSTPROCEDURE EVALUATION
"Patient: Dianna Damon    Procedure Summary     Date:  11/12/19 Room / Location:      Anesthesia Start:  1751 Anesthesia Stop:  2102    Procedure:  LABOR ANALGESIA Diagnosis:      Scheduled Providers:   Provider:  Dora Marinelli CRNA    Anesthesia Type:  epidural ASA Status:  2          Anesthesia Type: epidural  Last vitals  BP   114/74 (11/13/19 1200)   Temp   98.7 °F (37.1 °C) (11/13/19 1200)   Pulse   87 (11/13/19 1200)   Resp   17 (11/13/19 1200)     SpO2   97 % (11/13/19 1200)     Post Anesthesia Care and Evaluation    Patient location during evaluation: bedside  Patient participation: complete - patient participated  Level of consciousness: awake and alert  Pain management: adequate  Airway patency: patent  Anesthetic complications: No anesthetic complications    Cardiovascular status: acceptable  Respiratory status: acceptable  Hydration status: acceptable  Post Neuraxial Block status: Motor and sensory function returned to baseline and No signs or symptoms of PDPH  Comments: Blood pressure 114/74, pulse 87, temperature 98.7 °F (37.1 °C), temperature source Oral, resp. rate 17, height 157.5 cm (62\"), weight 62.1 kg (137 lb), last menstrual period 02/04/2019, SpO2 97 %, not currently breastfeeding.    Pt discharged from PACU based on suri score >8      "

## 2019-11-13 NOTE — PLAN OF CARE
Problem: Patient Care Overview  Goal: Plan of Care Review  Outcome: Ongoing (interventions implemented as appropriate)   11/13/19 2796   Coping/Psychosocial   Plan of Care Reviewed With patient   Plan of Care Review   Progress improving   OTHER   Outcome Summary VSS WNL, fundus fml U1 scant lochia, ambulating room     Goal: Individualization and Mutuality  Outcome: Ongoing (interventions implemented as appropriate)    Goal: Discharge Needs Assessment  Outcome: Ongoing (interventions implemented as appropriate)    Goal: Interprofessional Rounds/Family Conf  Outcome: Ongoing (interventions implemented as appropriate)      Problem: Anesthesia/Analgesia, Neuraxial (Obstetrics)  Goal: Signs and Symptoms of Listed Potential Problems Will be Absent, Minimized or Managed (Anesthesia/Analgesia, Neuraxial)  Outcome: Ongoing (interventions implemented as appropriate)      Problem: Postpartum (Vaginal Delivery) (Adult,Obstetrics,Pediatric)  Goal: Signs and Symptoms of Listed Potential Problems Will be Absent, Minimized or Managed (Postpartum)  Outcome: Ongoing (interventions implemented as appropriate)

## 2019-11-13 NOTE — PROGRESS NOTES
"[facility]  Vaginal Delivery Progress Note    Subjective   Postpartum Day 1: Vaginal Delivery    The patient feels well.  Her pain is well controlled with prescribed pain medications.   She is ambulating well.  Patient describes her bleeding as moderate lochia.    Breastfeeding: declines.    Objective     Vital Signs Range for the last 24 hours  Temperature: Temp:  [97.7 °F (36.5 °C)-99.9 °F (37.7 °C)] 98.7 °F (37.1 °C)   Temp Source: Temp src: Oral   BP: BP: ()/(42-90) 114/74   Pulse: Heart Rate:  [] 87   Respirations: Resp:  [16-20] 17   SPO2: SpO2:  [97 %-100 %] 97 %   O2 Amount (l/min):     O2 Devices Device (Oxygen Therapy): room air   Weight: Weight:  [62.1 kg (137 lb)] 62.1 kg (137 lb)     Admit Height:  Height: 157.5 cm (62\")      Physical Exam:  General:  no acute distresss.  Abdomen: Fundus: appropriate, firm, non tender  Extremities: normal, atraumatic, no cyanosis, and trace edema.     Lab results reviewed:  Yes   Rubella:  No results found for: RUBELLAIGGIN Nurse Transcribed from prenatal record --  No components found for: EXTRUBELQUAL  Rh Status:    RH type   Date Value Ref Range Status   11/13/2019 Negative  Final     Immunizations:   Immunization History   Administered Date(s) Administered   • Rho (D) Immune Globulin 09/28/2018, 11/29/2018, 08/13/2019   • Tdap 11/29/2018   • Varicella 11/30/2018       Assessment/Plan       * No active hospital problems. *      Dianna HARDY Nelson is Day 1  post-partum  Vaginal, Spontaneous    .      Plan:  Continue current care.      Gretel Lei MD  11/13/2019  12:40 PM  "

## 2019-11-13 NOTE — ANESTHESIA PROCEDURE NOTES
Labor Epidural      Patient reassessed immediately prior to procedure    Patient location during procedure: OB  Performed By  CRNA: Dora Marinelli CRNA  Preanesthetic Checklist  Completed: patient identified, surgical consent, pre-op evaluation, timeout performed, IV checked, risks and benefits discussed and monitors and equipment checked  Prep:  Pt Position:sitting  Sterile Tech:cap, gloves, mask and sterile barrier  Prep:chlorhexidine gluconate and isopropyl alcohol  Monitoring:continuous pulse oximetry and blood pressure monitoring  Epidural Block Procedure:  Approach:midline  Guidance:palpation technique  Location:L3-L4  Needle Type:Tuohy  Needle Gauge:18 G  Loss of Resistance Medium: saline  Loss of resistance: 3.5.  Cath Depth at skin:10 cm  Paresthesia: none  Aspiration:negative  Test Dose:negative  Number of Attempts: 1  Post Assessment:  Dressing:occlusive dressing applied and secured with tape  Pt Tolerance:patient tolerated the procedure well with no apparent complications  Complications:no

## 2019-11-13 NOTE — ANESTHESIA PREPROCEDURE EVALUATION
Anesthesia Evaluation     Patient summary reviewed and Nursing notes reviewed   no history of anesthetic complications:               Airway   Mallampati: I  TM distance: >3 FB  Neck ROM: full  Dental    (+) poor dentition    Pulmonary - negative pulmonary ROS   Cardiovascular - negative cardio ROS        Neuro/Psych- negative ROS  GI/Hepatic/Renal/Endo - negative ROS     Musculoskeletal (-) negative ROS    Abdominal    Substance History      OB/GYN    (+) Pregnant,         Other                 Lab Results   Component Value Date    WBC 20.87 (H) 11/12/2019    HGB 10.8 (L) 11/12/2019    HCT 32.4 (L) 11/12/2019    MCV 83.1 11/12/2019     11/12/2019     Ruptured, afebrile         Anesthesia Plan    ASA 2     epidural       Anesthetic plan, all risks, benefits, and alternatives have been provided, discussed and informed consent has been obtained with: patient.

## 2019-11-14 VITALS
HEART RATE: 76 BPM | HEIGHT: 62 IN | SYSTOLIC BLOOD PRESSURE: 129 MMHG | RESPIRATION RATE: 18 BRPM | DIASTOLIC BLOOD PRESSURE: 78 MMHG | OXYGEN SATURATION: 97 % | BODY MASS INDEX: 25.21 KG/M2 | WEIGHT: 137 LBS | TEMPERATURE: 98 F

## 2019-11-14 RX ORDER — IBUPROFEN 800 MG/1
800 TABLET ORAL EVERY 8 HOURS PRN
Qty: 90 TABLET | Refills: 2 | Status: SHIPPED | OUTPATIENT
Start: 2019-11-14 | End: 2020-07-31

## 2019-11-14 RX ORDER — OXYCODONE HYDROCHLORIDE AND ACETAMINOPHEN 5; 325 MG/1; MG/1
1 TABLET ORAL EVERY 6 HOURS PRN
Qty: 10 TABLET | Refills: 0 | Status: SHIPPED | OUTPATIENT
Start: 2019-11-14 | End: 2019-11-23

## 2019-11-14 RX ADMIN — PRENATAL VIT W/ FE FUMARATE-FA TAB 27-0.8 MG 1 TABLET: 27-0.8 TAB at 09:44

## 2019-11-14 NOTE — PROGRESS NOTES
"[facility]  Vaginal Delivery Progress Note    Subjective   Postpartum Day 2: Vaginal Delivery    The patient feels well.  Her pain is well controlled with prescribed pain medications.   She is ambulating well.  Patient describes her bleeding as thin lochia.    Breastfeeding: declines.    Objective     Vital Signs Range for the last 24 hours  Temperature: Temp:  [97.7 °F (36.5 °C)-98.7 °F (37.1 °C)] 98.1 °F (36.7 °C)   Temp Source: Temp src: Temporal   BP: BP: (112-116)/(63-74) 116/63   Pulse: Heart Rate:  [70-87] 70   Respirations: Resp:  [16-24] 18   SPO2: SpO2:  [96 %-97 %] 97 %   O2 Amount (l/min):     O2 Devices Device (Oxygen Therapy): room air   Weight:       Admit Height:  Height: 157.5 cm (62\")      Physical Exam:  General:  no acute distresss.  Abdomen: Fundus: appropriate, firm, non tender  Extremities: normal, atraumatic, no cyanosis, and trace edema.     Lab results reviewed:  Yes   Rubella:  No results found for: RUBELLAIGGIN Nurse Transcribed from prenatal record --  No components found for: EXTRUBELQUAL  Rh Status:    RH type   Date Value Ref Range Status   11/13/2019 Negative  Final     Immunizations:   Immunization History   Administered Date(s) Administered   • Rho (D) Immune Globulin 09/28/2018, 11/29/2018, 08/13/2019, 11/13/2019   • Tdap 11/29/2018   • Varicella 11/30/2018       Assessment/Plan       * No active hospital problems. *      Dianna Damon is Day 2  post-partum  Vaginal, Spontaneous    .      Plan:  Discharge home with standard precautions and return to clinic in 4-6 weeks.      Gretel Lei MD  11/14/2019  9:37 AM  "

## 2019-11-14 NOTE — PLAN OF CARE
Problem: Patient Care Overview  Goal: Plan of Care Review  Outcome: Ongoing (interventions implemented as appropriate)   11/14/19 0233   Coping/Psychosocial   Plan of Care Reviewed With patient   Plan of Care Review   Progress improving   OTHER   Outcome Summary VITASLS TABLE, FUNDUS FIRM ML U1, LOCHIA SMALL, PAIN AT LEVEL OF TOLERANCE AFTER PAIN MEDICATION GIVEN. BONDING WITH INFANT. CONTINUES TO FORMULA FEED INFANT.      Goal: Individualization and Mutuality  Outcome: Ongoing (interventions implemented as appropriate)   11/13/19 1554   Individualization   Patient Specific Preferences formula feed    Patient Specific Goals (Include Timeframe) formula feed   Patient Specific Interventions encourage to feed every 3 hours     Goal: Discharge Needs Assessment  Outcome: Ongoing (interventions implemented as appropriate)   11/14/19 0233   Discharge Needs Assessment   Readmission Within the Last 30 Days no previous admission in last 30 days   Concerns to be Addressed no discharge needs identified   Patient/Family Anticipates Transition to home with family   Patient/Family Anticipated Services at Transition none   Transportation Concerns car, none   Transportation Anticipated family or friend will provide   Anticipated Changes Related to Illness none   Equipment Needed After Discharge none   Disability   Equipment Currently Used at Home none     Goal: Interprofessional Rounds/Family Conf  Outcome: Ongoing (interventions implemented as appropriate)   11/14/19 0233   Interdisciplinary Rounds/Family Conf   Participants family;nursing;patient;physician       Problem: Postpartum (Vaginal Delivery) (Adult,Obstetrics,Pediatric)  Goal: Signs and Symptoms of Listed Potential Problems Will be Absent, Minimized or Managed (Postpartum)  Outcome: Ongoing (interventions implemented as appropriate)   11/14/19 0233   Goal/Outcome Evaluation   Problems Assessed (Postpartum Vaginal Delivery) all   Problems Present (Postpartum Vag Deliv)  none

## 2019-11-14 NOTE — DISCHARGE SUMMARY
Discharge Summary     Alexi Damon  : 1995  MRN: [id number]  CSN: [id number]    Date of Admission: 2019   Date of Discharge:  2019   Delivering Physician: Gretel Lei        Admission Diagnosis: 1. Normal labor [O80, Z37.9]  2. Normal delivery [O80]   Discharge Diagnosis: 1. Pregnancy at 39w2d - delivered       Procedures: 2019  - Vaginal, Spontaneous       Hospital Course  Patient is a 24 y.o.  who at 39w2d had a uncomplicated vaginal delivery.  Her postpartum course was without complications.  On PPD #2 she was ready for discharge.  She had normal lochia and pain was well controlled with oral medications.    Infant  male  fetus weighing 3005 g (6 lb 10 oz)   Apgars -  8  @ 1 minute /  9  @ 5 minutes.    Discharge labs  Lab Results   Component Value Date    WBC 20.87 (H) 2019    HGB 10.3 (L) 2019    HCT 30.9 (L) 2019     2019       Discharge Medications     Discharge Medications      New Medications      Instructions Start Date   ibuprofen 800 MG tablet  Commonly known as:  ADVIL,MOTRIN   800 mg, Oral, Every 8 Hours PRN      oxyCODONE-acetaminophen 5-325 MG per tablet  Commonly known as:  PERCOCET   1 tablet, Oral, Every 6 Hours PRN         Continue These Medications      Instructions Start Date   ferrous sulfate 325 (65 FE) MG tablet   325 mg, Oral, Daily With Breakfast      prenatal (CLASSIC) vitamin 28-0.8 MG tablet tablet   Oral, Daily             Discharge Disposition Home or Self Care   Condition on Discharge: good   Follow-up: 4 weeks with Dr. Lei, if no appointment, please call office     Gretel Lei MD  2019

## 2019-11-20 LAB
LAB AP CASE REPORT: NORMAL
LAB AP CLINICAL INFORMATION: NORMAL
PATH REPORT.FINAL DX SPEC: NORMAL
PATH REPORT.GROSS SPEC: NORMAL

## 2020-02-05 ENCOUNTER — HOSPITAL ENCOUNTER (EMERGENCY)
Facility: HOSPITAL | Age: 25
Discharge: HOME | End: 2020-02-05
Admitting: INTERNAL MEDICINE

## 2020-02-05 ENCOUNTER — APPOINTMENT (OUTPATIENT)
Dept: GENERAL RADIOLOGY | Facility: HOSPITAL | Age: 25
End: 2020-02-05

## 2020-02-05 VITALS
OXYGEN SATURATION: 100 % | RESPIRATION RATE: 16 BRPM | HEIGHT: 62 IN | SYSTOLIC BLOOD PRESSURE: 114 MMHG | BODY MASS INDEX: 21.71 KG/M2 | DIASTOLIC BLOOD PRESSURE: 70 MMHG | WEIGHT: 118 LBS | HEART RATE: 82 BPM | TEMPERATURE: 98.1 F

## 2020-02-05 DIAGNOSIS — N30.00 ACUTE CYSTITIS WITHOUT HEMATURIA: Primary | ICD-10-CM

## 2020-02-05 DIAGNOSIS — K52.9 GASTROENTERITIS: ICD-10-CM

## 2020-02-05 LAB
ALBUMIN SERPL-MCNC: 4.9 G/DL (ref 3.5–5.2)
ALBUMIN/GLOB SERPL: 1.4 G/DL
ALP SERPL-CCNC: 85 U/L (ref 39–117)
ALT SERPL W P-5'-P-CCNC: 17 U/L (ref 1–33)
ANION GAP SERPL CALCULATED.3IONS-SCNC: 10 MMOL/L (ref 5–15)
AST SERPL-CCNC: 19 U/L (ref 1–32)
B-HCG UR QL: NEGATIVE
BACTERIA UR QL AUTO: ABNORMAL /HPF
BASOPHILS # BLD AUTO: 0.07 10*3/MM3 (ref 0–0.2)
BASOPHILS NFR BLD AUTO: 0.8 % (ref 0–1.5)
BILIRUB SERPL-MCNC: 0.2 MG/DL (ref 0.2–1.2)
BILIRUB UR QL STRIP: NEGATIVE
BUN BLD-MCNC: 13 MG/DL (ref 6–20)
BUN/CREAT SERPL: 25.5 (ref 7–25)
CALCIUM SPEC-SCNC: 9.4 MG/DL (ref 8.6–10.5)
CHLORIDE SERPL-SCNC: 102 MMOL/L (ref 98–107)
CLARITY UR: CLEAR
CO2 SERPL-SCNC: 26 MMOL/L (ref 22–29)
COLOR UR: YELLOW
CREAT BLD-MCNC: 0.51 MG/DL (ref 0.57–1)
DEPRECATED RDW RBC AUTO: 51.9 FL (ref 37–54)
EOSINOPHIL # BLD AUTO: 0.42 10*3/MM3 (ref 0–0.4)
EOSINOPHIL NFR BLD AUTO: 4.8 % (ref 0.3–6.2)
ERYTHROCYTE [DISTWIDTH] IN BLOOD BY AUTOMATED COUNT: 16.2 % (ref 12.3–15.4)
GFR SERPL CREATININE-BSD FRML MDRD: 149 ML/MIN/1.73
GLOBULIN UR ELPH-MCNC: 3.5 GM/DL
GLUCOSE BLD-MCNC: 86 MG/DL (ref 65–99)
GLUCOSE UR STRIP-MCNC: NEGATIVE MG/DL
HCT VFR BLD AUTO: 40.5 % (ref 34–46.6)
HGB BLD-MCNC: 13.5 G/DL (ref 12–15.9)
HGB UR QL STRIP.AUTO: NEGATIVE
HOLD SPECIMEN: NORMAL
HOLD SPECIMEN: NORMAL
HYALINE CASTS UR QL AUTO: ABNORMAL /LPF
IMM GRANULOCYTES # BLD AUTO: 0.03 10*3/MM3 (ref 0–0.05)
IMM GRANULOCYTES NFR BLD AUTO: 0.3 % (ref 0–0.5)
INTERNAL NEGATIVE CONTROL: NEGATIVE
INTERNAL POSITIVE CONTROL: POSITIVE
KETONES UR QL STRIP: NEGATIVE
LEUKOCYTE ESTERASE UR QL STRIP.AUTO: ABNORMAL
LIPASE SERPL-CCNC: 23 U/L (ref 13–60)
LYMPHOCYTES # BLD AUTO: 3.17 10*3/MM3 (ref 0.7–3.1)
LYMPHOCYTES NFR BLD AUTO: 35.9 % (ref 19.6–45.3)
Lab: NORMAL
MCH RBC QN AUTO: 28.8 PG (ref 26.6–33)
MCHC RBC AUTO-ENTMCNC: 33.3 G/DL (ref 31.5–35.7)
MCV RBC AUTO: 86.5 FL (ref 79–97)
MONOCYTES # BLD AUTO: 0.76 10*3/MM3 (ref 0.1–0.9)
MONOCYTES NFR BLD AUTO: 8.6 % (ref 5–12)
NEUTROPHILS # BLD AUTO: 4.37 10*3/MM3 (ref 1.7–7)
NEUTROPHILS NFR BLD AUTO: 49.6 % (ref 42.7–76)
NITRITE UR QL STRIP: NEGATIVE
NRBC BLD AUTO-RTO: 0 /100 WBC (ref 0–0.2)
PH UR STRIP.AUTO: <=5 [PH] (ref 5–8)
PLATELET # BLD AUTO: 329 10*3/MM3 (ref 140–450)
PMV BLD AUTO: 10.3 FL (ref 6–12)
POTASSIUM BLD-SCNC: 4 MMOL/L (ref 3.5–5.2)
PROT SERPL-MCNC: 8.4 G/DL (ref 6–8.5)
PROT UR QL STRIP: NEGATIVE
RBC # BLD AUTO: 4.68 10*6/MM3 (ref 3.77–5.28)
RBC # UR: ABNORMAL /HPF
REF LAB TEST METHOD: ABNORMAL
SODIUM BLD-SCNC: 138 MMOL/L (ref 136–145)
SP GR UR STRIP: 1.03 (ref 1–1.03)
SQUAMOUS #/AREA URNS HPF: ABNORMAL /HPF
UROBILINOGEN UR QL STRIP: ABNORMAL
WBC NRBC COR # BLD: 8.82 10*3/MM3 (ref 3.4–10.8)
WBC UR QL AUTO: ABNORMAL /HPF
WHOLE BLOOD HOLD SPECIMEN: NORMAL
WHOLE BLOOD HOLD SPECIMEN: NORMAL

## 2020-02-05 PROCEDURE — 96374 THER/PROPH/DIAG INJ IV PUSH: CPT

## 2020-02-05 PROCEDURE — 85025 COMPLETE CBC W/AUTO DIFF WBC: CPT | Performed by: NURSE PRACTITIONER

## 2020-02-05 PROCEDURE — 80053 COMPREHEN METABOLIC PANEL: CPT | Performed by: NURSE PRACTITIONER

## 2020-02-05 PROCEDURE — 83690 ASSAY OF LIPASE: CPT | Performed by: NURSE PRACTITIONER

## 2020-02-05 PROCEDURE — 99283 EMERGENCY DEPT VISIT LOW MDM: CPT

## 2020-02-05 PROCEDURE — 81025 URINE PREGNANCY TEST: CPT | Performed by: NURSE PRACTITIONER

## 2020-02-05 PROCEDURE — 74019 RADEX ABDOMEN 2 VIEWS: CPT

## 2020-02-05 PROCEDURE — 81001 URINALYSIS AUTO W/SCOPE: CPT | Performed by: NURSE PRACTITIONER

## 2020-02-05 PROCEDURE — 25010000002 ONDANSETRON PER 1 MG: Performed by: NURSE PRACTITIONER

## 2020-02-05 RX ORDER — ONDANSETRON 2 MG/ML
4 INJECTION INTRAMUSCULAR; INTRAVENOUS ONCE
Status: COMPLETED | OUTPATIENT
Start: 2020-02-05 | End: 2020-02-05

## 2020-02-05 RX ORDER — SODIUM CHLORIDE 0.9 % (FLUSH) 0.9 %
10 SYRINGE (ML) INJECTION AS NEEDED
Status: DISCONTINUED | OUTPATIENT
Start: 2020-02-05 | End: 2020-02-05 | Stop reason: HOSPADM

## 2020-02-05 RX ORDER — ONDANSETRON 4 MG/1
4 TABLET, ORALLY DISINTEGRATING ORAL EVERY 6 HOURS PRN
Qty: 8 TABLET | Refills: 0 | Status: SHIPPED | OUTPATIENT
Start: 2020-02-05 | End: 2020-02-07

## 2020-02-05 RX ORDER — NITROFURANTOIN 25; 75 MG/1; MG/1
100 CAPSULE ORAL 2 TIMES DAILY
Qty: 14 CAPSULE | Refills: 0 | Status: SHIPPED | OUTPATIENT
Start: 2020-02-05 | End: 2020-02-12

## 2020-02-05 RX ADMIN — ONDANSETRON HYDROCHLORIDE 4 MG: 2 SOLUTION INTRAMUSCULAR; INTRAVENOUS at 20:44

## 2020-02-05 RX ADMIN — SODIUM CHLORIDE 1000 ML: 9 INJECTION, SOLUTION INTRAVENOUS at 20:44

## 2020-02-06 NOTE — ED PROVIDER NOTES
Subjective   Patient is a 24 old female that presents ER today with complaint of abdominal pain.  The patient reports that she had felt nauseated but denies vomiting.  She states she did have an episode of diarrhea last night.  She denies any fever.  She denies a difficulty with urination or hematuria.  Patient reports that both of her children have had a stomach virus over the past several days.  The patient presents to the ER today for further evaluation.      History provided by:  Patient   used: No    Abdominal Pain   Pain location:  Generalized  Pain quality: aching and cramping    Pain radiates to:  Does not radiate  Pain severity:  Mild  Onset quality:  Sudden  Duration:  1 day  Timing:  Constant  Progression:  Unchanged  Chronicity:  New  Context: not alcohol use, not awakening from sleep, not diet changes, not eating, not laxative use, not medication withdrawal, not previous surgeries, not recent illness, not recent sexual activity, not recent travel, not retching, not sick contacts, not suspicious food intake and not trauma    Relieved by:  Nothing  Worsened by:  Nothing  Ineffective treatments:  None tried  Associated symptoms: diarrhea and nausea    Associated symptoms: no anorexia, no belching, no chest pain, no chills, no constipation, no cough, no dysuria, no fatigue, no fever, no flatus, no hematemesis, no hematochezia, no hematuria, no melena, no shortness of breath, no sore throat, no vaginal bleeding, no vaginal discharge and no vomiting    Risk factors: obesity    Risk factors: no alcohol abuse, no aspirin use, not elderly, has not had multiple surgeries, no NSAID use, not pregnant and no recent hospitalization        Review of Systems   Constitutional: Negative for chills, fatigue and fever.   HENT: Negative for sore throat.    Respiratory: Negative for cough and shortness of breath.    Cardiovascular: Negative for chest pain.   Gastrointestinal: Positive for abdominal pain,  diarrhea and nausea. Negative for anorexia, constipation, flatus, hematemesis, hematochezia, melena and vomiting.   Genitourinary: Negative for dysuria, hematuria, vaginal bleeding and vaginal discharge.   All other systems reviewed and are negative.      Past Medical History:   Diagnosis Date   • Gonorrhea     + on 4/16/2018... - on 5/17/2018   • Herpes     HSV1- valtrex prescribed   • Urinary tract infection    • Urogenital trichomoniasis     + on 3/29/2018 - on 5/17/2018       No Known Allergies    Past Surgical History:   Procedure Laterality Date   • DENTAL PROCEDURE     • WISDOM TOOTH EXTRACTION         History reviewed. No pertinent family history.    Social History     Socioeconomic History   • Marital status: Single     Spouse name: Not on file   • Number of children: Not on file   • Years of education: Not on file   • Highest education level: Not on file   Tobacco Use   • Smoking status: Never Smoker   • Smokeless tobacco: Never Used   Substance and Sexual Activity   • Alcohol use: No   • Drug use: No   • Sexual activity: Yes     Partners: Male     Comment: 2 weeks           Objective   Physical Exam   Constitutional: She appears well-developed and well-nourished.   HENT:   Head: Normocephalic and atraumatic.   Eyes: Conjunctivae are normal.   Cardiovascular: Normal rate, regular rhythm and normal heart sounds.   Pulmonary/Chest: Effort normal and breath sounds normal.   Abdominal: Soft. Bowel sounds are normal. There is generalized tenderness.   Neurological: She is alert.   Skin: Skin is warm and dry. Capillary refill takes less than 2 seconds.   Psychiatric: She has a normal mood and affect.   Nursing note and vitals reviewed.      Procedures           ED Course  ED Course as of Feb 05 2136 Wed Feb 05, 2020 2133 Patient's labs show normal white blood cell count.  Her CMP was unremarkable.  Urinalysis shows trace amount of leukocytes with 3-5 RBCs, WBCs, and 1+ bacteria.  There is 7-12 squamous  epithelial cells.  She is having lower abdominal cramping so we will go ahead and treat this as urinary tract infection.    [LF]   2134 Patient's x-ray shows no acute findings.  At this time she be discharged home in stable condition.  I will give her prescription for Zofran that she can use for nausea.  She advised to follow-up with primary care provider 1 to 2 days for recheck.  She is advised to return to the ER for any new or worsening symptoms.    [LF]      ED Course User Index  [LF] Blossom Willson, APRN                                     XR Abdomen Flat & Upright   Final Result   No acute findings.           This report was finalized on 02/05/2020 21:30 by Dr. Ryne Garcia MD.        Labs Reviewed   COMPREHENSIVE METABOLIC PANEL - Abnormal; Notable for the following components:       Result Value    Creatinine 0.51 (*)     BUN/Creatinine Ratio 25.5 (*)     All other components within normal limits    Narrative:     GFR Normal >60  Chronic Kidney Disease <60  Kidney Failure <15     URINALYSIS W/ CULTURE IF INDICATED - Abnormal; Notable for the following components:    Leuk Esterase, UA Trace (*)     All other components within normal limits   CBC WITH AUTO DIFFERENTIAL - Abnormal; Notable for the following components:    RDW 16.2 (*)     Lymphocytes, Absolute 3.17 (*)     Eosinophils, Absolute 0.42 (*)     All other components within normal limits   URINALYSIS, MICROSCOPIC ONLY - Abnormal; Notable for the following components:    RBC, UA 3-5 (*)     WBC, UA 3-5 (*)     Bacteria, UA 1+ (*)     Squamous Epithelial Cells, UA 7-12 (*)     All other components within normal limits   LIPASE - Normal   POCT PEFORM URINE PREGNANCY - Normal   RAINBOW DRAW    Narrative:     The following orders were created for panel order [place] Draw.  Procedure                               Abnormality         Status                     ---------                               -----------         ------                     Light  Blue Top[[id number]]                                   In process                 Green Top (Gel)[[id number]]                                  In process                 Lavender Top[[id number]]                                     In process                 Red Top[[id number]]                                          In process                   Please view results for these tests on the individual orders.   CBC AND DIFFERENTIAL    Narrative:     The following orders were created for panel order CBC & Differential.  Procedure                               Abnormality         Status                     ---------                               -----------         ------                     CBC Auto Differential[[id number]]        Abnormal            Final result                 Please view results for these tests on the individual orders.   LIGHT BLUE TOP   GREEN TOP   LAVENDER TOP   RED TOP             MDM  Number of Diagnoses or Management Options  Acute cystitis without hematuria: new and requires workup  Gastroenteritis: new and requires workup     Amount and/or Complexity of Data Reviewed  Clinical lab tests: ordered and reviewed  Tests in the radiology section of CPT®: ordered and reviewed    Patient Progress  Patient progress: stable      Final diagnoses:   Acute cystitis without hematuria   Gastroenteritis            Blossom Willson, APRN  02/05/20 213

## 2020-02-06 NOTE — DISCHARGE INSTRUCTIONS
Follow up with one of the [facility] physician groups below to setup primary care. If you have trouble making an appointment, please call the [facility] Nurse Line at [phone]    Dr. Yelena Bryant DO, Dr. Keshia Acuña DO, and KIANA Liu  [facility]  [address] [place] [address], [address] [phone]    Dr. Armin Abad MD  [facility] - [address] [address], [address], [place], [place] [address] [phone]    Dr. Irvin Fernandes DO, Dr. Kali Ordaz DO,  KIANA Hendrickson, and KIANA Collado  [facility] - [address] [address], [address], [place], [place] [address] [phone]     Dr. Zari Ornelas MD, and KIANA Horton  [facility] [address] [address], [place], [place] [address] [phone]    Dr. Rah Correia MD and Dr. Aleksander Lucio MD  [address], [address] [phone]    Dr. Ramon Brody MD  [facility]  [address] [place] [address], [address], [place], [place], [address] [phone]    Dr. Robert Olvera MD  [facility]  [address] [place], [place], [address] [phone]

## 2020-03-18 ENCOUNTER — APPOINTMENT (OUTPATIENT)
Dept: GENERAL RADIOLOGY | Facility: HOSPITAL | Age: 25
End: 2020-03-18

## 2020-03-18 ENCOUNTER — HOSPITAL ENCOUNTER (EMERGENCY)
Facility: HOSPITAL | Age: 25
Discharge: HOME | End: 2020-03-18
Admitting: EMERGENCY MEDICINE

## 2020-03-18 VITALS
DIASTOLIC BLOOD PRESSURE: 66 MMHG | HEART RATE: 84 BPM | HEIGHT: 62 IN | BODY MASS INDEX: 21.35 KG/M2 | WEIGHT: 116 LBS | SYSTOLIC BLOOD PRESSURE: 113 MMHG | TEMPERATURE: 98.5 F | OXYGEN SATURATION: 100 % | RESPIRATION RATE: 20 BRPM

## 2020-03-18 DIAGNOSIS — J06.9 VIRAL URI WITH COUGH: Primary | ICD-10-CM

## 2020-03-18 LAB
B-HCG UR QL: NEGATIVE
FLUAV AG NPH QL: NEGATIVE
FLUBV AG NPH QL IA: NEGATIVE
INTERNAL NEGATIVE CONTROL: NEGATIVE
INTERNAL POSITIVE CONTROL: POSITIVE
Lab: NORMAL
S PYO AG THROAT QL: NEGATIVE

## 2020-03-18 PROCEDURE — 99284 EMERGENCY DEPT VISIT MOD MDM: CPT

## 2020-03-18 PROCEDURE — 87880 STREP A ASSAY W/OPTIC: CPT | Performed by: PHYSICIAN ASSISTANT

## 2020-03-18 PROCEDURE — 87804 INFLUENZA ASSAY W/OPTIC: CPT | Performed by: PHYSICIAN ASSISTANT

## 2020-03-18 PROCEDURE — 87081 CULTURE SCREEN ONLY: CPT | Performed by: PHYSICIAN ASSISTANT

## 2020-03-18 PROCEDURE — 71046 X-RAY EXAM CHEST 2 VIEWS: CPT

## 2020-03-18 PROCEDURE — 63710000001 DEXAMETHASONE PER 0.25 MG: Performed by: PHYSICIAN ASSISTANT

## 2020-03-18 PROCEDURE — 81025 URINE PREGNANCY TEST: CPT | Performed by: PHYSICIAN ASSISTANT

## 2020-03-18 RX ORDER — KETOROLAC TROMETHAMINE 10 MG/1
10 TABLET, FILM COATED ORAL EVERY 6 HOURS PRN
Status: DISCONTINUED | OUTPATIENT
Start: 2020-03-18 | End: 2020-03-18 | Stop reason: HOSPADM

## 2020-03-18 RX ORDER — KETOROLAC TROMETHAMINE 10 MG/1
10 TABLET, FILM COATED ORAL EVERY 6 HOURS PRN
Qty: 12 TABLET | Refills: 0 | Status: SHIPPED | OUTPATIENT
Start: 2020-03-18 | End: 2020-07-31

## 2020-03-18 RX ORDER — DEXAMETHASONE 4 MG/1
4 TABLET ORAL ONCE
Status: COMPLETED | OUTPATIENT
Start: 2020-03-18 | End: 2020-03-18

## 2020-03-18 RX ORDER — DEXTROMETHORPHAN HYDROBROMIDE AND PROMETHAZINE HYDROCHLORIDE 15; 6.25 MG/5ML; MG/5ML
5 SYRUP ORAL 4 TIMES DAILY PRN
Qty: 100 ML | Refills: 0 | Status: SHIPPED | OUTPATIENT
Start: 2020-03-18 | End: 2020-07-31

## 2020-03-18 RX ADMIN — DEXAMETHASONE 4 MG: 4 TABLET ORAL at 14:00

## 2020-03-18 RX ADMIN — ALUMINUM HYDROXIDE, MAGNESIUM HYDROXIDE, SIMETHICONE 30 ML: 400; 400; 40 SUSPENSION ORAL at 14:24

## 2020-03-18 RX ADMIN — KETOROLAC TROMETHAMINE 10 MG: 10 TABLET, FILM COATED ORAL at 14:24

## 2020-03-18 NOTE — DISCHARGE INSTRUCTIONS
It is very important to establish care the primary care provider.  Please see list below for available providers in this area  As we talked about today you have a viral upper respiratory illness.  At this time it is important to treat your symptoms.  This means keeping yourself well-hydrated.  You are being given a prescription today for medication called Toradol, this cannot be used at the same time as ibuprofen as they are in same class of medications.  You are also being given a medication for cough syrup to help you get adequate rest during the evening time.  It is important that you establish care with a primary care provider and are reevaluated within the next 48 hours to assure improvement if not resolution of your symptoms.  Please read below for further information regarding viral URIs, also known as viral upper respiratory infections.    Follow up with one of the [facility] physician groups below to setup primary care. If you have trouble making an appointment, please call the [facility] Nurse Line at [phone]    Dr. Yelena Bryant DO, Dr. Keshia Acuña DO, and KIANA Liu  [facility]  [address] [place] [address], [address] [phone]    Dr. Armin Abad MD  [facility] - [address] [address], [address], [place] [address] [phone]    Dr. Irvin Fernandes DO, Dr. Kali Ordaz DO,  KIANA Hendrickson, and KIANA Collado  [facility] - [address] [address], [address], [place] [address] [phone]     Dr. Zari Ornelas MD, and KIANA Horton  [facility] - [address] [address], [place] [address] [phone]    Dr. Rah Correia MD and Dr. Aleksander Lucio MD  [facility] [address],  [address]  [phone]    Dr. Ramon Brody MD  [facility]  [address] [place] [address], [address], [place], [place], [address] [phone]    Dr. Robert Olvera MD  [facility]  [address] [place], [place], [address] [phone]        Viral Respiratory Infection  A respiratory infection is an illness that affects part of the respiratory system, such as the lungs, nose, or throat. A respiratory infection that is caused by a virus is called a viral respiratory infection.  Common types of viral respiratory infections include:  · A cold.  · The flu (influenza).  · A respiratory syncytial virus (RSV) infection.  What are the causes?  This condition is caused by a virus.  What are the signs or symptoms?  Symptoms of this condition include:  · A stuffy or runny nose.  · Yellow or green nasal discharge.  · A cough.  · Sneezing.  · Fatigue.  · Achy muscles.  · A sore throat.  · Sweating or chills.  · A fever.  · A headache.  How is this diagnosed?  This condition may be diagnosed based on:  · Your symptoms.  · A physical exam.  · Testing of nasal swabs.  How is this treated?  This condition may be treated with medicines, such as:  · Antiviral medicine. This may shorten the length of time a person has symptoms.  · Expectorants. These make it easier to cough up mucus.  · Decongestant nasal sprays.  · Acetaminophen or NSAIDs to relieve fever and pain.  Antibiotic medicines are not prescribed for viral infections. This is because antibiotics are designed to kill bacteria. They are not effective against viruses.  Follow these instructions at home:    Managing pain and congestion  · Take over-the-counter and prescription medicines only as told by your health care provider.  · If you have a sore throat, gargle with a salt-water mixture 3-4 times a day or as needed. To make a salt-water mixture, completely dissolve ½-1 tsp of salt in 1 cup of warm  water.  · Use nose drops made from salt water to ease congestion and soften raw skin around your nose.  · Drink enough fluid to keep your urine pale yellow. This helps prevent dehydration and helps loosen up mucus.  General instructions  · Rest as much as possible.  · Do not drink alcohol.  · Do not use any products that contain nicotine or tobacco, such as cigarettes and e-cigarettes. If you need help quitting, ask your health care provider.  · Keep all follow-up visits as told by your health care provider. This is important.  How is this prevented?    · Get an annual flu shot. You may get the flu shot in late summer, fall, or winter. Ask your health care provider when you should get your flu shot.  · Avoid exposing others to your respiratory infection.  ? Stay home from work or school as told by your health care provider.  ? Wash your hands with soap and water often, especially after you cough or sneeze. If soap and water are not available, use alcohol-based hand .  · Avoid contact with people who are sick during cold and flu season. This is generally fall and winter.  Contact a health care provider if:  · Your symptoms last for 10 days or longer.  · Your symptoms get worse over time.  · You have a fever.  · You have severe sinus pain in your face or forehead.  · The glands in your jaw or neck become very swollen.  Get help right away if you:  · Feel pain or pressure in your chest.  · Have shortness of breath.  · Faint or feel like you will faint.  · Have severe and persistent vomiting.  · Feel confused or disoriented.  Summary  · A respiratory infection is an illness that affects part of the respiratory system, such as the lungs, nose, or throat. A respiratory infection that is caused by a virus is called a viral respiratory infection.  · Common types of viral respiratory infections are a cold, influenza, and respiratory syncytial virus (RSV) infection.  · Symptoms of this condition include a stuffy or  runny nose, cough, sneezing, fatigue, achy muscles, sore throat, and fevers or chills.  · Antibiotic medicines are not prescribed for viral infections. This is because antibiotics are designed to kill bacteria. They are not effective against viruses.  This information is not intended to replace advice given to you by your health care provider. Make sure you discuss any questions you have with your health care provider.  Document Released: 09/27/2006 Document Revised: 12/26/2019 Document Reviewed: 01/28/2019  [employer] Patient Education © 2020 [employer]

## 2020-03-20 LAB — BACTERIA SPEC AEROBE CULT: NORMAL

## 2020-03-22 NOTE — ED PROVIDER NOTES
Subjective   History of Present Illness    Patient is a 24-year-old female presenting to ED with flulike illness and sore throat.  Patient reported last night she had sudden onset of a itchy throat which is exacerbated by swallowing.  Patient denies any dysphagia or difficulty swallowing.  Patient reported since that time she has also developed generalized body aching, a mild generalized headache, and feelings of lightheaded and dizziness like she is off balance.  Patient denies any neck pain or neck stiffness.  Patient denies any recent head injuries.  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  Patient denies any fevers, chills, diaphoresis.  Patient reported a mild cough that is been nonproductive and denies any other respiratory symptoms.  Patient denies any GI symptoms including nausea, vomiting, diarrhea, or abdominal pain.  Patient denies any chance of pregnancy due to a recent.  But noted she is sexually active.  Patient denies any dysuria, hematuria, or flank pain.  Patient denies any history of migraines or vertigo.  Patient denies any use of medications over-the-counter prior to arrival to assist with her symptoms.  Patient denies any previous history of known medication allergies, environmental allergies, or food allergies.  Patient denies any angioedema or wheezing.  Patient denies any recent exposure to new products, changes to detergents/lotions/beddings, or any recent ingestion of seafood or nuts.    Records reviewed show patient has been seen numerous times for OB/GYN issues and was last seen in the ED on 2/5/2020 at which time she was diagnosed with acute cystitis without hematuria and gastroenteritis.  At this time patient had an abdomen flat and upright x-ray which showed: No acute findings.  Patient was sent home with a prescription for Macrobid and Zofran.    Review of Systems   Constitutional: Negative.  Negative for chills,  "diaphoresis and fever.   HENT: Positive for sore throat (\"itchy\" sensation). Negative for congestion, sinus pressure and trouble swallowing.    Respiratory: Negative.  Negative for cough, chest tightness and shortness of breath.    Cardiovascular: Negative.  Negative for chest pain.   Gastrointestinal: Negative.  Negative for abdominal pain, nausea and vomiting.   Genitourinary: Negative.  Negative for dysuria, flank pain and hematuria.        Denies pregnancy   Musculoskeletal: Positive for myalgias (generalized body aches). Negative for arthralgias, neck pain and neck stiffness.   Skin: Negative.  Negative for rash and wound.   Neurological: Positive for dizziness (\"off balance\"), light-headedness and headaches (mild, generalized). Negative for syncope and weakness.   Psychiatric/Behavioral: Negative.    All other systems reviewed and are negative.      Past Medical History:   Diagnosis Date   • Gonorrhea     + on 4/16/2018... - on 5/17/2018   • Herpes     HSV1- valtrex prescribed   • Urinary tract infection    • Urogenital trichomoniasis     + on 3/29/2018 - on 5/17/2018       No Known Allergies    Past Surgical History:   Procedure Laterality Date   • DENTAL PROCEDURE     • WISDOM TOOTH EXTRACTION         History reviewed. No pertinent family history.    Social History     Socioeconomic History   • Marital status: Single     Spouse name: Not on file   • Number of children: Not on file   • Years of education: Not on file   • Highest education level: Not on file   Tobacco Use   • Smoking status: Never Smoker   • Smokeless tobacco: Never Used   Substance and Sexual Activity   • Alcohol use: No   • Drug use: No   • Sexual activity: Yes     Partners: Male     Comment: 2 weeks           Objective   Physical Exam   Constitutional: She is oriented to person, place, and time. She appears well-developed and well-nourished. She is cooperative. No distress.   HENT:   Head: Normocephalic and atraumatic.   Right Ear: Tympanic " membrane, external ear and ear canal normal.   Left Ear: Tympanic membrane, external ear and ear canal normal.   Nose: Mucosal edema present. No rhinorrhea. Right sinus exhibits no maxillary sinus tenderness and no frontal sinus tenderness. Left sinus exhibits no maxillary sinus tenderness and no frontal sinus tenderness.   Mouth/Throat: Uvula is midline and mucous membranes are normal. Normal dentition. Posterior oropharyngeal erythema present. No oropharyngeal exudate or posterior oropharyngeal edema. Tonsils are 1+ on the right. Tonsils are 1+ on the left. No tonsillar exudate.   No angioedema including normal inspection of lips as well as tongue   Eyes: Pupils are equal, round, and reactive to light. Conjunctivae, EOM and lids are normal. Right conjunctiva is not injected. Left conjunctiva is not injected.   Neck: Normal range of motion. Neck supple. No Brudzinski's sign and no Kernig's sign noted.   Cardiovascular: Normal rate, regular rhythm, normal heart sounds, intact distal pulses and normal pulses.   No murmur heard.  Pulses:       Radial pulses are 2+ on the right side, and 2+ on the left side.        Dorsalis pedis pulses are 2+ on the right side, and 2+ on the left side.        Posterior tibial pulses are 2+ on the right side, and 2+ on the left side.   No calf tenderness bilaterally, no peripheral edema   Pulmonary/Chest: Effort normal and breath sounds normal. No respiratory distress. She has no decreased breath sounds. She has no wheezes. She has no rhonchi. She has no rales. She exhibits no bony tenderness.   No reproducible tenderness to palpitation of chest wall   Abdominal: Soft. Normal appearance and bowel sounds are normal. There is no tenderness. There is no guarding and no CVA tenderness.   Musculoskeletal:        Cervical back: Normal.        Thoracic back: Normal.        Lumbar back: Normal.   Lymphadenopathy:     She has no cervical adenopathy.   Neurological: She is alert and oriented to  person, place, and time. She has normal strength. No cranial nerve deficit or sensory deficit. Coordination and gait normal. GCS eye subscore is 4. GCS verbal subscore is 5. GCS motor subscore is 6.   Skin: Skin is warm, dry and intact. Capillary refill takes less than 2 seconds. No rash noted. Rash is not urticarial. She is not diaphoretic.   Psychiatric: She has a normal mood and affect. Her speech is normal and behavior is normal. Thought content normal.   Nursing note and vitals reviewed.      Procedures           ED Course  ED Course as of Mar 22 1528   Wed Mar 18, 2020   1413 Influenza and rapid strep negative.Patient just returned from chest x-ray at this time with no questions, concerns, or needs.Miracle mouthwash has just arrived from pharmacy and waiting administration.    [JS]   1450 Chest x-ray with no acute findings.    [JS]   1632 Upon reevaluation patient is requesting to be discharged at this time stating she feels much better.  Patient reported the medications given to her made her throat as well as symptoms feel better.  Patient denies any development of new symptoms while in ED.  Reviewed with patient negative influenza, negative strep, chest x-ray results, and diagnosis of viral URI.  Discussed importance of continued symptomatic trial with over-the-counter medications such as Motrin and Tylenol as well as ability to provide a prescription for cough medication.  Discussed with patient ability to use the miracle mouthwash to help soothe sore throat.  Discussed with patient importance of establishing care with a primary care provider and need for a reevaluation within the next 24 to 48 hours to assure improvement if not resolution of symptoms.  Discussed return precautions and need for immediate return to ED should she develop any new or worsening symptoms.  Patient without any further questions, concerns, or needs at this time and is stable for discharge.    [JS]   1638 Discussed case with   Familia Lock who is in agreement at this time with discharge home with symptomatic control.    [JS]      ED Course User Index  [JS] Yasir Rodríguez PA-C                                 EMR Dragon/transcription disclaimer: Much of this encounter note was created utilizing an electronic transcription/translation of spoken language to printed text.  Electronic translation of spoken language may permit erroneous, or at times, nonsensical words or phrases to be in advertently transcribed; although I have reviewed the note for such errors to the best of my ability, some may still exist.            MDM  Number of Diagnoses or Management Options  Viral URI with cough:      Amount and/or Complexity of Data Reviewed  Clinical lab tests: ordered and reviewed  Tests in the radiology section of CPT®: ordered and reviewed  Decide to obtain previous medical records or to obtain history from someone other than the patient: yes  Review and summarize past medical records: yes  Discuss the patient with other providers: yes    Patient Progress  Patient progress: improved      Final diagnoses:   Viral URI with cough            Yasir Rodríguez PA-C  03/22/20 1528

## 2020-07-31 ENCOUNTER — HOSPITAL ENCOUNTER (EMERGENCY)
Facility: HOSPITAL | Age: 25
Discharge: HOME | End: 2020-07-31
Attending: EMERGENCY MEDICINE | Admitting: EMERGENCY MEDICINE

## 2020-07-31 VITALS
SYSTOLIC BLOOD PRESSURE: 122 MMHG | RESPIRATION RATE: 16 BRPM | DIASTOLIC BLOOD PRESSURE: 86 MMHG | WEIGHT: 118 LBS | HEIGHT: 62 IN | OXYGEN SATURATION: 100 % | TEMPERATURE: 98.9 F | HEART RATE: 98 BPM | BODY MASS INDEX: 21.71 KG/M2

## 2020-07-31 DIAGNOSIS — R19.7 DIARRHEA, UNSPECIFIED TYPE: ICD-10-CM

## 2020-07-31 DIAGNOSIS — R55 SYNCOPE AND COLLAPSE: Primary | ICD-10-CM

## 2020-07-31 LAB
ALBUMIN SERPL-MCNC: 4.4 G/DL (ref 3.5–5.2)
ALBUMIN/GLOB SERPL: 1.3 G/DL
ALP SERPL-CCNC: 79 U/L (ref 39–117)
ALT SERPL W P-5'-P-CCNC: 10 U/L (ref 1–33)
ANION GAP SERPL CALCULATED.3IONS-SCNC: 14 MMOL/L (ref 5–15)
AST SERPL-CCNC: 14 U/L (ref 1–32)
BASOPHILS # BLD AUTO: 0.06 10*3/MM3 (ref 0–0.2)
BASOPHILS NFR BLD AUTO: 0.3 % (ref 0–1.5)
BILIRUB SERPL-MCNC: 0.3 MG/DL (ref 0–1.2)
BUN SERPL-MCNC: 10 MG/DL (ref 6–20)
BUN/CREAT SERPL: 19.2 (ref 7–25)
CALCIUM SPEC-SCNC: 9.1 MG/DL (ref 8.6–10.5)
CHLORIDE SERPL-SCNC: 100 MMOL/L (ref 98–107)
CO2 SERPL-SCNC: 23 MMOL/L (ref 22–29)
CREAT SERPL-MCNC: 0.52 MG/DL (ref 0.57–1)
DEPRECATED RDW RBC AUTO: 43.1 FL (ref 37–54)
EOSINOPHIL # BLD AUTO: 0.08 10*3/MM3 (ref 0–0.4)
EOSINOPHIL NFR BLD AUTO: 0.4 % (ref 0.3–6.2)
ERYTHROCYTE [DISTWIDTH] IN BLOOD BY AUTOMATED COUNT: 13.2 % (ref 12.3–15.4)
GFR SERPL CREATININE-BSD FRML MDRD: 146 ML/MIN/1.73
GLOBULIN UR ELPH-MCNC: 3.3 GM/DL
GLUCOSE SERPL-MCNC: 89 MG/DL (ref 65–99)
HCG INTACT+B SERPL-ACNC: <0.5 MIU/ML
HCT VFR BLD AUTO: 38.3 % (ref 34–46.6)
HGB BLD-MCNC: 12.5 G/DL (ref 12–15.9)
HOLD SPECIMEN: NORMAL
IMM GRANULOCYTES # BLD AUTO: 0.08 10*3/MM3 (ref 0–0.05)
IMM GRANULOCYTES NFR BLD AUTO: 0.4 % (ref 0–0.5)
LYMPHOCYTES # BLD AUTO: 1.89 10*3/MM3 (ref 0.7–3.1)
LYMPHOCYTES NFR BLD AUTO: 10.6 % (ref 19.6–45.3)
MCH RBC QN AUTO: 29 PG (ref 26.6–33)
MCHC RBC AUTO-ENTMCNC: 32.6 G/DL (ref 31.5–35.7)
MCV RBC AUTO: 88.9 FL (ref 79–97)
MONOCYTES # BLD AUTO: 1.18 10*3/MM3 (ref 0.1–0.9)
MONOCYTES NFR BLD AUTO: 6.6 % (ref 5–12)
NEUTROPHILS NFR BLD AUTO: 14.52 10*3/MM3 (ref 1.7–7)
NEUTROPHILS NFR BLD AUTO: 81.7 % (ref 42.7–76)
NRBC BLD AUTO-RTO: 0 /100 WBC (ref 0–0.2)
PLATELET # BLD AUTO: 264 10*3/MM3 (ref 140–450)
PMV BLD AUTO: 10.3 FL (ref 6–12)
POTASSIUM SERPL-SCNC: 3.9 MMOL/L (ref 3.5–5.2)
PROT SERPL-MCNC: 7.7 G/DL (ref 6–8.5)
RBC # BLD AUTO: 4.31 10*6/MM3 (ref 3.77–5.28)
SODIUM SERPL-SCNC: 137 MMOL/L (ref 136–145)
WBC # BLD AUTO: 17.81 10*3/MM3 (ref 3.4–10.8)
WHOLE BLOOD HOLD SPECIMEN: NORMAL
WHOLE BLOOD HOLD SPECIMEN: NORMAL

## 2020-07-31 PROCEDURE — 80053 COMPREHEN METABOLIC PANEL: CPT | Performed by: EMERGENCY MEDICINE

## 2020-07-31 PROCEDURE — 84702 CHORIONIC GONADOTROPIN TEST: CPT | Performed by: EMERGENCY MEDICINE

## 2020-07-31 PROCEDURE — 93005 ELECTROCARDIOGRAM TRACING: CPT | Performed by: EMERGENCY MEDICINE

## 2020-07-31 PROCEDURE — 93010 ELECTROCARDIOGRAM REPORT: CPT | Performed by: INTERNAL MEDICINE

## 2020-07-31 PROCEDURE — 85025 COMPLETE CBC W/AUTO DIFF WBC: CPT | Performed by: EMERGENCY MEDICINE

## 2020-07-31 PROCEDURE — 99284 EMERGENCY DEPT VISIT MOD MDM: CPT

## 2020-07-31 RX ORDER — SODIUM CHLORIDE 0.9 % (FLUSH) 0.9 %
10 SYRINGE (ML) INJECTION AS NEEDED
Status: DISCONTINUED | OUTPATIENT
Start: 2020-07-31 | End: 2020-07-31 | Stop reason: HOSPADM

## 2020-07-31 RX ADMIN — SODIUM CHLORIDE, POTASSIUM CHLORIDE, SODIUM LACTATE AND CALCIUM CHLORIDE 1000 ML: 600; 310; 30; 20 INJECTION, SOLUTION INTRAVENOUS at 16:48

## 2020-09-21 ENCOUNTER — OFFICE VISIT (OUTPATIENT)
Dept: PRIMARY CARE CLINIC | Age: 25
End: 2020-09-21
Payer: COMMERCIAL

## 2020-09-21 VITALS
HEIGHT: 62 IN | HEART RATE: 90 BPM | WEIGHT: 128.8 LBS | OXYGEN SATURATION: 99 % | DIASTOLIC BLOOD PRESSURE: 70 MMHG | TEMPERATURE: 97.7 F | BODY MASS INDEX: 23.7 KG/M2 | SYSTOLIC BLOOD PRESSURE: 106 MMHG | RESPIRATION RATE: 18 BRPM

## 2020-09-21 LAB
APPEARANCE FLUID: ABNORMAL
BILIRUBIN, POC: ABNORMAL
BLOOD URINE, POC: ABNORMAL
CLARITY, POC: ABNORMAL
COLOR, POC: YELLOW
CONTROL: ABNORMAL
GLUCOSE URINE, POC: ABNORMAL
KETONES, POC: ABNORMAL
LEUKOCYTE EST, POC: ABNORMAL
NITRITE, POC: ABNORMAL
PH, POC: 6.5
PREGNANCY TEST URINE, POC: POSITIVE
PROTEIN, POC: ABNORMAL
SPECIFIC GRAVITY, POC: 1.02
UROBILINOGEN, POC: 0.2

## 2020-09-21 PROCEDURE — 99203 OFFICE O/P NEW LOW 30 MIN: CPT | Performed by: FAMILY MEDICINE

## 2020-09-21 PROCEDURE — 81002 URINALYSIS NONAUTO W/O SCOPE: CPT | Performed by: FAMILY MEDICINE

## 2020-09-21 PROCEDURE — 81025 URINE PREGNANCY TEST: CPT | Performed by: FAMILY MEDICINE

## 2020-09-21 RX ORDER — CEFDINIR 300 MG/1
300 CAPSULE ORAL 2 TIMES DAILY
Qty: 14 CAPSULE | Refills: 0 | Status: SHIPPED | OUTPATIENT
Start: 2020-09-21 | End: 2020-09-28

## 2020-09-21 ASSESSMENT — PATIENT HEALTH QUESTIONNAIRE - PHQ9
SUM OF ALL RESPONSES TO PHQ9 QUESTIONS 1 & 2: 1
1. LITTLE INTEREST OR PLEASURE IN DOING THINGS: 0
2. FEELING DOWN, DEPRESSED OR HOPELESS: 1
SUM OF ALL RESPONSES TO PHQ QUESTIONS 1-9: 1
SUM OF ALL RESPONSES TO PHQ QUESTIONS 1-9: 1

## 2020-09-21 ASSESSMENT — ENCOUNTER SYMPTOMS
DIARRHEA: 0
ABDOMINAL PAIN: 1
EYE DISCHARGE: 0
BACK PAIN: 0
COUGH: 0
COLOR CHANGE: 0
VOMITING: 0
NAUSEA: 1
WHEEZING: 0

## 2020-09-21 NOTE — PROGRESS NOTES
SUBJECTIVE:    Patient ID: Niru Beth is a 22 y.o. female. HPI:   Patient presents today to establish care. She states that she has not had a previous PCP. She has been seeing OB. She has a 3year-old and 3year-old. She states that she has had some abdominal cramping and discomfort in her lower abdomen over the past couple of weeks. She states that she thinks that she is probably close to starting her period. She states that she has not had any spotting. She states that she has been a little nauseated in the last few days. She states that she is unsure of her last menstrual period date. She is not currently on birth control. She states that she has had some discomfort and burning when she urinates. Past Medical History:   Diagnosis Date    ADHD (attention deficit hyperactivity disorder)     Varicella       Current Outpatient Medications   Medication Sig Dispense Refill    cefdinir (OMNICEF) 300 MG capsule Take 1 capsule by mouth 2 times daily for 7 days 14 capsule 0     No current facility-administered medications for this visit. No Known Allergies    Review of Systems   Constitutional: Negative for activity change, appetite change and fever. HENT: Negative for congestion and nosebleeds. Eyes: Negative for discharge. Respiratory: Negative for cough and wheezing. Cardiovascular: Negative for chest pain and leg swelling. Gastrointestinal: Positive for abdominal pain (lower abdominal cramping) and nausea. Negative for diarrhea and vomiting. Genitourinary: Positive for dysuria and frequency. Negative for difficulty urinating and urgency. Musculoskeletal: Negative for back pain and gait problem. Skin: Negative for color change and rash. Neurological: Negative for dizziness and headaches. Hematological: Does not bruise/bleed easily. Psychiatric/Behavioral: Negative for sleep disturbance and suicidal ideas. OBJECTIVE:     Physical Exam  Vitals signs reviewed. Constitutional:       General: She is not in acute distress. Appearance: Normal appearance. She is well-developed. She is not diaphoretic. HENT:      Head: Normocephalic and atraumatic. Right Ear: External ear normal.      Left Ear: External ear normal.   Neck:      Musculoskeletal: Normal range of motion and neck supple. Cardiovascular:      Rate and Rhythm: Normal rate and regular rhythm. Pulses: Normal pulses. Heart sounds: Normal heart sounds. No murmur. Pulmonary:      Effort: Pulmonary effort is normal. No respiratory distress. Breath sounds: Normal breath sounds. Abdominal:      General: Abdomen is flat. Bowel sounds are normal.      Palpations: Abdomen is soft. Tenderness: There is abdominal tenderness (lower abdomen). Skin:     General: Skin is warm and dry. Neurological:      General: No focal deficit present. Mental Status: She is alert and oriented to person, place, and time. Mental status is at baseline. Psychiatric:         Mood and Affect: Mood normal.         Behavior: Behavior normal.         Thought Content: Thought content normal.         Judgment: Judgment normal.        /70   Pulse 90   Temp 97.7 °F (36.5 °C)   Resp 18   Ht 5' 2\" (1.575 m)   Wt 128 lb 12.8 oz (58.4 kg)   SpO2 99%   BMI 23.56 kg/m²      ASSESSMENT:    Sagar Francis was seen today for establish care. Diagnoses and all orders for this visit:    Positive pregnancy test    Dysuria  -     POCT Urinalysis no Micro    Missed period  -     POCT urine pregnancy    Acute UTI    Other orders  -     cefdinir (OMNICEF) 300 MG capsule; Take 1 capsule by mouth 2 times daily for 7 days        PLAN:    Urine pregnancy was positive. I have encouraged her to make an appointment with her OB. We are going to start her on some antibiotics for UTI. She is to let us know if symptoms or not improving otherwise we will see her back in 1 year.     EMR Dragon/transcription disclaimer:  Much of this encounter note is electronic transcription/translation of spoken language toprinted texts. The electronic translation of spoken language may be erroneous, or at times, nonsensical words or phrases may be inadvertently transcribed.   Although I have reviewed the note for such errors, some may stillexist.

## 2020-09-23 LAB
ORGANISM: ABNORMAL
URINE CULTURE, ROUTINE: ABNORMAL
URINE CULTURE, ROUTINE: ABNORMAL

## 2020-10-05 ENCOUNTER — TRANSCRIBE ORDERS (OUTPATIENT)
Dept: ADMINISTRATIVE | Facility: HOSPITAL | Age: 25
End: 2020-10-05

## 2020-10-05 ENCOUNTER — LAB (OUTPATIENT)
Dept: LAB | Facility: HOSPITAL | Age: 25
End: 2020-10-05

## 2020-10-05 DIAGNOSIS — Z34.81 ENCOUNTER FOR SUPERVISION OF OTHER NORMAL PREGNANCY IN FIRST TRIMESTER: ICD-10-CM

## 2020-10-05 DIAGNOSIS — Z34.81 ENCOUNTER FOR SUPERVISION OF OTHER NORMAL PREGNANCY IN FIRST TRIMESTER: Primary | ICD-10-CM

## 2020-10-05 LAB — HCG INTACT+B SERPL-ACNC: 29.16 MIU/ML

## 2020-10-05 PROCEDURE — 84702 CHORIONIC GONADOTROPIN TEST: CPT | Performed by: OBSTETRICS & GYNECOLOGY

## 2020-10-05 PROCEDURE — 36415 COLL VENOUS BLD VENIPUNCTURE: CPT

## 2020-10-06 ENCOUNTER — HOSPITAL ENCOUNTER (EMERGENCY)
Facility: HOSPITAL | Age: 25
Discharge: HOME | End: 2020-10-07
Attending: EMERGENCY MEDICINE | Admitting: EMERGENCY MEDICINE

## 2020-10-06 ENCOUNTER — NURSE TRIAGE (OUTPATIENT)
Dept: CALL CENTER | Facility: HOSPITAL | Age: 25
End: 2020-10-06

## 2020-10-06 DIAGNOSIS — O03.9 MISCARRIAGE: Primary | ICD-10-CM

## 2020-10-06 DIAGNOSIS — N39.0 URINARY TRACT INFECTION WITH HEMATURIA, SITE UNSPECIFIED: ICD-10-CM

## 2020-10-06 DIAGNOSIS — R31.9 URINARY TRACT INFECTION WITH HEMATURIA, SITE UNSPECIFIED: ICD-10-CM

## 2020-10-06 LAB
ALBUMIN SERPL-MCNC: 4.9 G/DL (ref 3.5–5.2)
ALBUMIN/GLOB SERPL: 1.6 G/DL
ALP SERPL-CCNC: 83 U/L (ref 39–117)
ALT SERPL W P-5'-P-CCNC: 11 U/L (ref 1–33)
ANION GAP SERPL CALCULATED.3IONS-SCNC: 7 MMOL/L (ref 5–15)
AST SERPL-CCNC: 14 U/L (ref 1–32)
BACTERIA UR QL AUTO: ABNORMAL /HPF
BASOPHILS # BLD AUTO: 0.08 10*3/MM3 (ref 0–0.2)
BASOPHILS NFR BLD AUTO: 0.8 % (ref 0–1.5)
BILIRUB SERPL-MCNC: 0.2 MG/DL (ref 0–1.2)
BILIRUB UR QL STRIP: NEGATIVE
BUN SERPL-MCNC: 14 MG/DL (ref 6–20)
BUN/CREAT SERPL: 25 (ref 7–25)
CALCIUM SPEC-SCNC: 9.6 MG/DL (ref 8.6–10.5)
CHLORIDE SERPL-SCNC: 106 MMOL/L (ref 98–107)
CLARITY UR: ABNORMAL
CO2 SERPL-SCNC: 28 MMOL/L (ref 22–29)
COLOR UR: YELLOW
CREAT SERPL-MCNC: 0.56 MG/DL (ref 0.57–1)
DEPRECATED RDW RBC AUTO: 45.2 FL (ref 37–54)
EOSINOPHIL # BLD AUTO: 0.22 10*3/MM3 (ref 0–0.4)
EOSINOPHIL NFR BLD AUTO: 2.1 % (ref 0.3–6.2)
ERYTHROCYTE [DISTWIDTH] IN BLOOD BY AUTOMATED COUNT: 13.6 % (ref 12.3–15.4)
GFR SERPL CREATININE-BSD FRML MDRD: 134 ML/MIN/1.73
GLOBULIN UR ELPH-MCNC: 3.1 GM/DL
GLUCOSE SERPL-MCNC: 110 MG/DL (ref 65–99)
GLUCOSE UR STRIP-MCNC: NEGATIVE MG/DL
HCG INTACT+B SERPL-ACNC: 21.84 MIU/ML
HCT VFR BLD AUTO: 39.1 % (ref 34–46.6)
HGB BLD-MCNC: 12.8 G/DL (ref 12–15.9)
HGB UR QL STRIP.AUTO: ABNORMAL
HYALINE CASTS UR QL AUTO: ABNORMAL /LPF
IMM GRANULOCYTES # BLD AUTO: 0.03 10*3/MM3 (ref 0–0.05)
IMM GRANULOCYTES NFR BLD AUTO: 0.3 % (ref 0–0.5)
KETONES UR QL STRIP: NEGATIVE
LEUKOCYTE ESTERASE UR QL STRIP.AUTO: ABNORMAL
LYMPHOCYTES # BLD AUTO: 3.32 10*3/MM3 (ref 0.7–3.1)
LYMPHOCYTES NFR BLD AUTO: 32.1 % (ref 19.6–45.3)
MCH RBC QN AUTO: 29.6 PG (ref 26.6–33)
MCHC RBC AUTO-ENTMCNC: 32.7 G/DL (ref 31.5–35.7)
MCV RBC AUTO: 90.3 FL (ref 79–97)
MONOCYTES # BLD AUTO: 0.73 10*3/MM3 (ref 0.1–0.9)
MONOCYTES NFR BLD AUTO: 7.1 % (ref 5–12)
NEUTROPHILS NFR BLD AUTO: 5.97 10*3/MM3 (ref 1.7–7)
NEUTROPHILS NFR BLD AUTO: 57.6 % (ref 42.7–76)
NITRITE UR QL STRIP: NEGATIVE
NRBC BLD AUTO-RTO: 0 /100 WBC (ref 0–0.2)
NUMBER OF DOSES: NORMAL
PH UR STRIP.AUTO: 8.5 [PH] (ref 5–8)
PLATELET # BLD AUTO: 280 10*3/MM3 (ref 140–450)
PMV BLD AUTO: 10.4 FL (ref 6–12)
POTASSIUM SERPL-SCNC: 4.2 MMOL/L (ref 3.5–5.2)
PROT SERPL-MCNC: 8 G/DL (ref 6–8.5)
PROT UR QL STRIP: ABNORMAL
RBC # BLD AUTO: 4.33 10*6/MM3 (ref 3.77–5.28)
RBC # UR: ABNORMAL /HPF
REF LAB TEST METHOD: ABNORMAL
SODIUM SERPL-SCNC: 141 MMOL/L (ref 136–145)
SP GR UR STRIP: 1.02 (ref 1–1.03)
SQUAMOUS #/AREA URNS HPF: ABNORMAL /HPF
UROBILINOGEN UR QL STRIP: ABNORMAL
WBC # BLD AUTO: 10.35 10*3/MM3 (ref 3.4–10.8)
WBC UR QL AUTO: ABNORMAL /HPF

## 2020-10-06 PROCEDURE — 84702 CHORIONIC GONADOTROPIN TEST: CPT | Performed by: PHYSICIAN ASSISTANT

## 2020-10-06 PROCEDURE — 96374 THER/PROPH/DIAG INJ IV PUSH: CPT

## 2020-10-06 PROCEDURE — 96372 THER/PROPH/DIAG INJ SC/IM: CPT

## 2020-10-06 PROCEDURE — 87086 URINE CULTURE/COLONY COUNT: CPT | Performed by: PHYSICIAN ASSISTANT

## 2020-10-06 PROCEDURE — 81001 URINALYSIS AUTO W/SCOPE: CPT | Performed by: PHYSICIAN ASSISTANT

## 2020-10-06 PROCEDURE — 96365 THER/PROPH/DIAG IV INF INIT: CPT

## 2020-10-06 PROCEDURE — 86901 BLOOD TYPING SEROLOGIC RH(D): CPT | Performed by: PHYSICIAN ASSISTANT

## 2020-10-06 PROCEDURE — 80053 COMPREHEN METABOLIC PANEL: CPT | Performed by: PHYSICIAN ASSISTANT

## 2020-10-06 PROCEDURE — 85025 COMPLETE CBC W/AUTO DIFF WBC: CPT | Performed by: PHYSICIAN ASSISTANT

## 2020-10-06 PROCEDURE — 86900 BLOOD TYPING SEROLOGIC ABO: CPT | Performed by: PHYSICIAN ASSISTANT

## 2020-10-06 PROCEDURE — 86850 RBC ANTIBODY SCREEN: CPT | Performed by: PHYSICIAN ASSISTANT

## 2020-10-06 PROCEDURE — 99283 EMERGENCY DEPT VISIT LOW MDM: CPT

## 2020-10-07 ENCOUNTER — APPOINTMENT (OUTPATIENT)
Dept: ULTRASOUND IMAGING | Facility: HOSPITAL | Age: 25
End: 2020-10-07

## 2020-10-07 VITALS
OXYGEN SATURATION: 98 % | HEIGHT: 62 IN | HEART RATE: 84 BPM | RESPIRATION RATE: 18 BRPM | TEMPERATURE: 98 F | DIASTOLIC BLOOD PRESSURE: 60 MMHG | SYSTOLIC BLOOD PRESSURE: 113 MMHG | BODY MASS INDEX: 24.29 KG/M2 | WEIGHT: 132 LBS

## 2020-10-07 LAB
ABO GROUP BLD: NORMAL
BLD GP AB SCN SERPL QL: NEGATIVE
RH BLD: NEGATIVE

## 2020-10-07 PROCEDURE — 25010000003 RHO D IMMUNE GLOBULIN 1500 UNITS SOLUTION PREFILLED SYRINGE: Performed by: PHYSICIAN ASSISTANT

## 2020-10-07 PROCEDURE — 96374 THER/PROPH/DIAG INJ IV PUSH: CPT

## 2020-10-07 PROCEDURE — 96372 THER/PROPH/DIAG INJ SC/IM: CPT

## 2020-10-07 PROCEDURE — 76817 TRANSVAGINAL US OBSTETRIC: CPT

## 2020-10-07 PROCEDURE — 25010000002 CEFTRIAXONE PER 250 MG: Performed by: PHYSICIAN ASSISTANT

## 2020-10-07 RX ORDER — CEPHALEXIN 500 MG/1
500 CAPSULE ORAL 3 TIMES DAILY
Qty: 15 CAPSULE | Refills: 0 | Status: SHIPPED | OUTPATIENT
Start: 2020-10-07 | End: 2021-09-19

## 2020-10-07 RX ADMIN — CEFTRIAXONE SODIUM 1 G: 1 INJECTION, POWDER, FOR SOLUTION INTRAMUSCULAR; INTRAVENOUS at 00:05

## 2020-10-07 RX ADMIN — HUMAN RHO(D) IMMUNE GLOBULIN 1500 UNITS: 300 INJECTION, SOLUTION INTRAMUSCULAR at 00:29

## 2020-10-07 NOTE — DISCHARGE INSTRUCTIONS
Dianna,    Your pregnancy levels (HCG) have dropped. Given this and your bleeding I am concerned about a miscarriage. Please follow up with your OB to have your pregnancy levels repeated.

## 2020-10-07 NOTE — ED PROVIDER NOTES
Subjective   History of Present Illness    Patient is a pleasant 24-year-old female with chief complaint of vaginal bleeding while pregnant.  The patient describes her last known menstrual cycle was over 5 weeks ago she thinks.  She had taken pregnancy tests by her primary care provider which was positive.  She also taking a home pregnancy test which was positive.  She did seek medical attention with her OB/GYN, Dr. Lei.  Patient is  3, para 2, miscarriage 0.  The patient describes that she has had blood work routinely and has ultrasounds weekly because they cannot see the baby in the ultrasound.  Her last ultrasound was yesterday.  She is unsure what her quant has been.  She denies any issues until today where she had a sudden onset of vaginal bleeding quantified as a medium flow.  This lasted momentarily and she had a little bit abdominal fullness with it.  She denies any recurrent pain or vaginal bleeding.  She is concerned she may have miscarried so she came to the ER to be further evaluated.    Patient denies any trauma.  She thinks she is O-.  She denies any recent illnesses.  Denies any nausea vomiting or urinary complaints.    Review of Systems   Constitutional: Negative for activity change.   HENT: Negative.    Eyes: Negative.    Respiratory: Negative.    Cardiovascular: Negative.    Gastrointestinal: Negative.  Negative for abdominal pain, nausea and vomiting.   Genitourinary: Positive for vaginal bleeding.   Musculoskeletal: Negative.    Skin: Negative.    Neurological: Negative.    Psychiatric/Behavioral: Negative.    All other systems reviewed and are negative.      Past Medical History:   Diagnosis Date   • Gonorrhea     + on 2018... - on 2018   • Herpes     HSV1- valtrex prescribed   • Urinary tract infection    • Urogenital trichomoniasis     + on 3/29/2018 - on 2018       No Known Allergies    Past Surgical History:   Procedure Laterality Date   • DENTAL PROCEDURE     •  "WISDOM TOOTH EXTRACTION         History reviewed. No pertinent family history.    Social History     Socioeconomic History   • Marital status: Single     Spouse name: Not on file   • Number of children: Not on file   • Years of education: Not on file   • Highest education level: Not on file   Tobacco Use   • Smoking status: Never Smoker   • Smokeless tobacco: Never Used   Substance and Sexual Activity   • Alcohol use: No   • Drug use: No   • Sexual activity: Yes     Partners: Male     Comment: 2 weeks       Prior to Admission medications    Not on File       Medications   cefTRIAXone (ROCEPHIN) 1 g/100 mL 0.9% NS (MBP) (0 g Intravenous Stopped 10/7/20 0020)   Rho D immune globulin (RHOPHYLAC) IM syringe 1,500 Units (1,500 Units Intramuscular Given 10/7/20 0029)       /79 (BP Location: Right arm, Patient Position: Sitting)   Pulse 78   Temp 97.6 °F (36.4 °C) (Oral)   Resp 17   Ht 157.5 cm (62\")   Wt 59.9 kg (132 lb)   SpO2 98%   BMI 24.14 kg/m²       Objective   Physical Exam  Vitals signs and nursing note reviewed.   Constitutional:       General: She is not in acute distress.     Appearance: Normal appearance. She is well-developed. She is not toxic-appearing or diaphoretic.   HENT:      Head: Normocephalic and atraumatic.   Eyes:      Conjunctiva/sclera: Conjunctivae normal.      Pupils: Pupils are equal, round, and reactive to light.   Neck:      Musculoskeletal: Normal range of motion and neck supple.      Trachea: No tracheal deviation.   Cardiovascular:      Rate and Rhythm: Normal rate and regular rhythm.      Heart sounds: Normal heart sounds. No murmur.   Pulmonary:      Effort: Pulmonary effort is normal.      Breath sounds: Normal breath sounds.   Abdominal:      General: Bowel sounds are normal. There is no distension.      Palpations: Abdomen is soft. There is no mass.      Tenderness: There is no abdominal tenderness. There is no guarding or rebound.   Musculoskeletal: Normal range of " motion.   Skin:     General: Skin is warm and dry.      Capillary Refill: Capillary refill takes less than 2 seconds.   Neurological:      General: No focal deficit present.      Mental Status: She is alert and oriented to person, place, and time.      Deep Tendon Reflexes: Reflexes are normal and symmetric.   Psychiatric:         Mood and Affect: Mood normal.         Behavior: Behavior normal.         Thought Content: Thought content normal.         Judgment: Judgment normal.         Procedures         Lab Results (last 24 hours)     Procedure Component Value Units Date/Time    CBC & Differential [[id number]]  (Abnormal) Collected: 10/06/20 2252    Specimen: Blood Updated: 10/06/20 2315    Narrative:      The following orders were created for panel order CBC & Differential.  Procedure                               Abnormality         Status                     ---------                               -----------         ------                     CBC Auto Differential[[id number]]        Abnormal            Final result                 Please view results for these tests on the individual orders.    Comprehensive Metabolic Panel [[id number]]  (Abnormal) Collected: 10/06/20 2252    Specimen: Blood Updated: 10/06/20 2324     Glucose 110 mg/dL      BUN 14 mg/dL      Creatinine 0.56 mg/dL      Sodium 141 mmol/L      Potassium 4.2 mmol/L      Chloride 106 mmol/L      CO2 28.0 mmol/L      Calcium 9.6 mg/dL      Total Protein 8.0 g/dL      Albumin 4.90 g/dL      ALT (SGPT) 11 U/L      AST (SGOT) 14 U/L      Alkaline Phosphatase 83 U/L      Total Bilirubin 0.2 mg/dL      eGFR Non African Amer 134 mL/min/1.73      Globulin 3.1 gm/dL      A/G Ratio 1.6 g/dL      BUN/Creatinine Ratio 25.0     Anion Gap 7.0 mmol/L     Narrative:      GFR Normal >60  Chronic Kidney Disease <60  Kidney Failure <15      hCG, Quantitative, Pregnancy [[id number]] Collected: 10/06/20 2252    Specimen: Blood Updated: 10/06/20 2339     HCG Quantitative  21.84 mIU/mL     Narrative:      HCG Ranges by Gestational Age    Females - non-pregnant premenopausal   </= 1mIU/mL HCG  Females - postmenopausal               </= 7mIU/mL HCG    3 Weeks         5.8 -    71.2 mIU/mL  4 Weeks         9.5 -     750 mIU/mL  5 Weeks         217 -   7,138 mIU/mL  6 Weeks         158 -  31,795 mIU/mL  7 Weeks       3,697 - 163,563 mIU/mL  8 Weeks      32,065 - 149,571 mIU/mL  9 Weeks      63,803 - 151,410 mIU/mL  10 Weeks     46,509 - 186,977 mIU/mL  12 Weeks     27,832 - 210,612 mIU/mL  14 Weeks     13,950 -  62,530 mIU/mL  15 Weeks     12,039 -  70,971 mIU/mL  16 Weeks      9,040 -  56,451 mIU/mL  17 Weeks      8,175 -  55,868 mIU/mL  18 Weeks      8,099 -  58,176 mIU/mL  Results may be falsely decreased if patient taking Biotin.      Urinalysis With Culture If Indicated - Urine, Clean Catch [[id number]]  (Abnormal) Collected: 10/06/20 2252    Specimen: Urine, Clean Catch Updated: 10/06/20 2309     Color, UA Yellow     Appearance, UA Turbid     pH, UA 8.5     Specific Gravity, UA 1.023     Glucose, UA Negative     Ketones, UA Negative     Bilirubin, UA Negative     Blood, UA Large (3+)     Protein, UA Trace     Leuk Esterase, UA Small (1+)     Nitrite, UA Negative     Urobilinogen, UA 1.0 E.U./dL    CBC Auto Differential [[id number]]  (Abnormal) Collected: 10/06/20 2252    Specimen: Blood Updated: 10/06/20 2315     WBC 10.35 10*3/mm3      RBC 4.33 10*6/mm3      Hemoglobin 12.8 g/dL      Hematocrit 39.1 %      MCV 90.3 fL      MCH 29.6 pg      MCHC 32.7 g/dL      RDW 13.6 %      RDW-SD 45.2 fl      MPV 10.4 fL      Platelets 280 10*3/mm3      Neutrophil % 57.6 %      Lymphocyte % 32.1 %      Monocyte % 7.1 %      Eosinophil % 2.1 %      Basophil % 0.8 %      Immature Grans % 0.3 %      Neutrophils, Absolute 5.97 10*3/mm3      Lymphocytes, Absolute 3.32 10*3/mm3      Monocytes, Absolute 0.73 10*3/mm3      Eosinophils, Absolute 0.22 10*3/mm3      Basophils, Absolute 0.08 10*3/mm3       Immature Grans, Absolute 0.03 10*3/mm3      nRBC 0.0 /100 WBC     Urinalysis, Microscopic Only - Urine, Clean Catch [[id number]]  (Abnormal) Collected: 10/06/20 2252    Specimen: Urine, Clean Catch Updated: 10/06/20 2309     RBC, UA 13-20 /HPF      WBC, UA 13-20 /HPF      Bacteria, UA 1+ /HPF      Squamous Epithelial Cells, UA 13-20 /HPF      Hyaline Casts, UA 3-6 /LPF      Methodology Automated Microscopy    Urine Culture - Urine, Urine, Clean Catch [[id number]] Collected: 10/06/20 2252    Specimen: Urine, Clean Catch Updated: 10/06/20 2309          No results found.    ED Course  ED Course as of Oct 07 0352   Tue Oct 06, 2020   2352 Patient has been educated laboratory data the urinalysis suggesting acute UTI.  Her blood type is O- and RhoGam has been ordered.  The patient quant is 21.  I have ordered ultrasound hopes to be able to see something.  I reviewed this with Dr. Abdalla who will be assuming the patient's care.    [TK]   Wed Oct 07, 2020   0348 Long talk with the patient. Her US shows ? Tiny gestational sac  as well as  a 2.3 cm right ovarian cyst   However, he HCG has dropped in the last 48 hours. Given this and her bleeding I am concerned about a miscarriage. At this time we will have her follow up with OB and treat UTI. All questions answered.     [JH]      ED Course User Index  [JH] Chidi Abdalla MD  [TK] Eleazar Wseley PA          [facility]      Working diagnosis is:    Final diagnoses:   Miscarriage   Urinary tract infection with hematuria, site unspecified          Chidi Abdalla MD  10/07/20 0352

## 2020-10-07 NOTE — TELEPHONE ENCOUNTER
"Reviewed guideline with caller, advises she be seen within 24 hours. Advised she can call to speak with OB on call if she is anxious. Caller agrees to follow care advice.    Reason for Disposition  • MODERATE vaginal bleeding (i.e., soaking 1 pad / hour; clots)    Additional Information  • Negative: Shock suspected (e.g., cold/pale/clammy skin, too weak to stand, low BP, rapid pulse)  • Negative: Difficult to awaken or acting confused (e.g., disoriented, slurred speech)  • Negative: Passed out (i.e., lost consciousness, collapsed and was not responding)  • Negative: Sounds like a life-threatening emergency to the triager  • Negative: [1] Vaginal bleeding AND [2] pregnant > 20 weeks  • Negative: Not pregnant or pregnancy status unknown  • Negative: SEVERE abdominal pain  • Negative: [1] SEVERE vaginal bleeding (e.g., soaking 2 pads / hour, large blood clots) AND [2] present 2 or more hours  • Negative: SEVERE dizziness (e.g., unable to stand, requires support to walk, feels like passing out)  • Negative: [1] MODERATE vaginal bleeding (i.e., soaking 1 pad / hour; clots) AND [2] present > 6 hours  • Negative: [1] MODERATE vaginal bleeding (i.e., soaking 1 pad / hour; clots) AND [2] pregnant > 12 weeks  • Negative: Passed tissue (e.g., gray-white)  • Negative: Shoulder pain  • Negative: Pale skin (pallor) of new onset or worsening  • Negative: Patient sounds very sick or weak to the triager  • Negative: [1] Constant abdominal pain AND [2] present > 2 hours  • Negative: Fever > 100.4 F (38.0 C)  • Negative: [1] Intermittent lower abdominal pain (e.g., cramping) AND [2] present > 24 hours  • Negative: Prior history of \"ectopic pregnancy\" or previous tubal surgery (e.g., tubal ligation)  • Negative: Pain or burning with passing urine (urination)    Answer Assessment - Initial Assessment Questions  1. ONSET: \"When did this bleeding start?\"        tonight  2. DESCRIPTION: \"Describe the bleeding that you are having.\" \"How " "much bleeding is there?\"     - SPOTTING: spotting, or pinkish / brownish mucous discharge; does not fill panti-liner or pad     - MILD:  less than 1 pad / hour; less than patient's usual menstrual bleeding    - MODERATE: 1-2 pads / hour; small-medium blood clots (e.g., pea, grape, small coin)     - SEVERE: soaking 2 or more pads/hour for 2 or more hours; bleeding not contained by pads or continuous red blood from vagina; large blood clots (e.g., golf ball, large coin)       mild  3. ABDOMINAL PAIN SEVERITY: If present, ask: \"How bad is it?\"  (e.g., Scale 1-10; mild, moderate, or severe)    - MILD (1-3): doesn't interfere with normal activities, abdomen soft and not tender to touch     - MODERATE (4-7): interferes with normal activities or awakens from sleep, tender to touch     - SEVERE (8-10): excruciating pain, doubled over, unable to do any normal activities      mild  4. PREGNANCY: \"Do you know how many weeks or months pregnant you are?\" \"When was the first day of your last normal menstrual period?\"      3-4 weeks, a month to a month and a half ago  5. HEMODYNAMIC STATUS: \"Are you weak or feeling lightheaded?\" If so, ask: \"Can you stand and walk normally?\"       no  6. OTHER SYMPTOMS: \"What other symptoms are you having with the bleeding?\" (e.g., passed tissue, vaginal discharge, fever, menstrual-type cramps)      no    Protocols used: PREGNANCY - VAGINAL BLEEDING LESS THAN 20 WEEKS [id number]      "

## 2020-10-08 LAB — BACTERIA SPEC AEROBE CULT: NO GROWTH

## 2020-12-10 ENCOUNTER — OFFICE VISIT (OUTPATIENT)
Dept: PRIMARY CARE CLINIC | Age: 25
End: 2020-12-10
Payer: COMMERCIAL

## 2020-12-10 VITALS
WEIGHT: 131 LBS | SYSTOLIC BLOOD PRESSURE: 102 MMHG | BODY MASS INDEX: 24.11 KG/M2 | HEIGHT: 62 IN | TEMPERATURE: 97.2 F | RESPIRATION RATE: 16 BRPM | DIASTOLIC BLOOD PRESSURE: 82 MMHG | OXYGEN SATURATION: 98 % | HEART RATE: 104 BPM

## 2020-12-10 LAB
CONTROL: NORMAL
GONADOTROPIN, CHORIONIC (HCG) QUANT: 0.1 MIU/ML (ref 0–5.3)
PREGNANCY TEST URINE, POC: NEGATIVE

## 2020-12-10 PROCEDURE — 81025 URINE PREGNANCY TEST: CPT | Performed by: FAMILY MEDICINE

## 2020-12-10 PROCEDURE — 99214 OFFICE O/P EST MOD 30 MIN: CPT | Performed by: FAMILY MEDICINE

## 2020-12-10 PROCEDURE — 36415 COLL VENOUS BLD VENIPUNCTURE: CPT | Performed by: FAMILY MEDICINE

## 2020-12-10 ASSESSMENT — ENCOUNTER SYMPTOMS
COLOR CHANGE: 0
EYE DISCHARGE: 0
DIARRHEA: 0
COUGH: 0
NAUSEA: 0
WHEEZING: 0
VOMITING: 0
BACK PAIN: 0
ABDOMINAL PAIN: 1

## 2020-12-10 NOTE — PROGRESS NOTES
SUBJECTIVE:    Patient ID: Rosana Marinelli is a 26 y.o. female. HPI:   Patient complains today of right hand numbness and tingling. She states this is been going on for about a month. She states that she has not had any injury to her hand or wrist area. She states that she feels like it falls asleep. She states that she has not had any work-up done to this point. She states that it goes all the way into her fingertips. She states that she has not noticed any color changes or rash. She states that she is also having some abdominal discomfort. She states that she is having some lower abdominal cramping. She states that she is not currently on birth control. She did experience a miscarriage back in October. She states that her last menstrual cycle was December 4 but it was very light. She states that she has had a little spotting since that time. She denies any nausea, vomiting, or diarrhea. Past Medical History:   Diagnosis Date    ADHD (attention deficit hyperactivity disorder)     Miscarriage     10/2020    Varicella       No current outpatient medications on file. No current facility-administered medications for this visit. No Known Allergies    Review of Systems   Constitutional: Negative for activity change, appetite change and fever. HENT: Negative for congestion and nosebleeds. Eyes: Negative for discharge. Respiratory: Negative for cough and wheezing. Cardiovascular: Negative for chest pain and leg swelling. Gastrointestinal: Positive for abdominal pain (cramping). Negative for diarrhea, nausea and vomiting. Genitourinary: Negative for difficulty urinating, frequency and urgency. Musculoskeletal: Negative for back pain and gait problem. Skin: Negative for color change and rash. Neurological: Positive for numbness (right hand and wrist). Negative for dizziness and headaches. Hematological: Does not bruise/bleed easily.    Psychiatric/Behavioral: Negative for sleep disturbance and suicidal ideas. OBJECTIVE:     Physical Exam  Vitals signs reviewed. Constitutional:       General: She is not in acute distress. Appearance: Normal appearance. She is well-developed. She is not diaphoretic. HENT:      Head: Normocephalic and atraumatic. Right Ear: External ear normal.      Left Ear: External ear normal.   Neck:      Musculoskeletal: Normal range of motion and neck supple. Cardiovascular:      Rate and Rhythm: Normal rate and regular rhythm. Pulses: Normal pulses. Heart sounds: Normal heart sounds. No murmur. Pulmonary:      Effort: Pulmonary effort is normal. No respiratory distress. Breath sounds: Normal breath sounds. Abdominal:      General: Abdomen is flat. Bowel sounds are normal.      Palpations: Abdomen is soft. Tenderness: There is abdominal tenderness (lower abdomen). Musculoskeletal:      Comments: +phalen's   Skin:     General: Skin is warm and dry. Neurological:      General: No focal deficit present. Mental Status: She is alert and oriented to person, place, and time. Mental status is at baseline. Psychiatric:         Mood and Affect: Mood normal.         Behavior: Behavior normal.         Thought Content: Thought content normal.         Judgment: Judgment normal.        /82 (Site: Left Upper Arm, Position: Sitting, Cuff Size: Medium Adult)   Pulse 104   Temp 97.2 °F (36.2 °C) (Temporal)   Resp 16   Ht 5' 2\" (1.575 m)   Wt 131 lb (59.4 kg)   LMP 12/04/2020   SpO2 98%   BMI 23.96 kg/m²      ASSESSMENT:    Candis was seen today for hand numbness. Diagnoses and all orders for this visit:    Abdominal pain, unspecified abdominal location  -     POCT urine pregnancy    Numbness and tingling in right hand  -     Nerve conduction test; Future  -     EMG; Future    Missed period  -     HCG, Quantitative, Pregnancy        PLAN:    Nerve conduction study and EMG were ordered today.   We will notify her of the results. If they are positive then we will put a referral in for neurosurgery. Urine pregnancy was negative today. We are going to get a quantitative hCG. If this comes back negative and her abdominal pain is not improving she is to let us know we will get an ultrasound. EMR Dragon/transcription disclaimer:  Much of this encounter note is electronic transcription/translation of spoken language toprinted texts. The electronic translation of spoken language may be erroneous, or at times, nonsensical words or phrases may be inadvertently transcribed.   Although I have reviewed the note for such errors, some may stillexist.

## 2021-01-01 ENCOUNTER — OFFICE VISIT (OUTPATIENT)
Age: 26
End: 2021-01-01

## 2021-01-01 ENCOUNTER — OFFICE VISIT (OUTPATIENT)
Dept: URGENT CARE | Age: 26
End: 2021-01-01
Payer: COMMERCIAL

## 2021-01-01 VITALS
DIASTOLIC BLOOD PRESSURE: 66 MMHG | SYSTOLIC BLOOD PRESSURE: 113 MMHG | HEART RATE: 83 BPM | RESPIRATION RATE: 18 BRPM | OXYGEN SATURATION: 98 % | BODY MASS INDEX: 24.84 KG/M2 | HEIGHT: 62 IN | WEIGHT: 135 LBS | TEMPERATURE: 98 F

## 2021-01-01 DIAGNOSIS — R43.0 ANOSMIA: ICD-10-CM

## 2021-01-01 DIAGNOSIS — H65.93 OME (OTITIS MEDIA WITH EFFUSION), BILATERAL: Primary | ICD-10-CM

## 2021-01-01 DIAGNOSIS — Z11.59 SCREENING FOR VIRAL DISEASE: Primary | ICD-10-CM

## 2021-01-01 PROCEDURE — 99213 OFFICE O/P EST LOW 20 MIN: CPT | Performed by: NURSE PRACTITIONER

## 2021-01-01 PROCEDURE — 99999 PR OFFICE/OUTPT VISIT,PROCEDURE ONLY: CPT | Performed by: NURSE PRACTITIONER

## 2021-01-01 RX ORDER — AMOXICILLIN AND CLAVULANATE POTASSIUM 875; 125 MG/1; MG/1
1 TABLET, FILM COATED ORAL 2 TIMES DAILY
Qty: 14 TABLET | Refills: 0 | Status: SHIPPED | OUTPATIENT
Start: 2021-01-01 | End: 2021-01-08

## 2021-01-01 ASSESSMENT — ENCOUNTER SYMPTOMS
COUGH: 0
SINUS PRESSURE: 0
SHORTNESS OF BREATH: 0
GASTROINTESTINAL NEGATIVE: 1
SINUS PAIN: 0
WHEEZING: 0
EYES NEGATIVE: 1
STRIDOR: 0

## 2021-01-01 NOTE — PATIENT INSTRUCTIONS
Patient Education      COVID testing today    Quarantine until results return    Start Augmentin for you ear infection    May take Tylenol for fevers/body aches    Return if symptoms worsen     Patient Education        Ear Infection (Otitis Media): Care Instructions  Overview     An ear infection may start with a cold and affect the middle ear (otitis media). It can hurt a lot. Most ear infections clear up on their own in a couple of days and do not need antibiotics. Also, antibiotics do not work against viruses, which may be the cause of your infection. Regular doses of pain relievers are the best way to reduce your fever and help you feel better. Follow-up care is a key part of your treatment and safety. Be sure to make and go to all appointments, and call your doctor if you are having problems. It's also a good idea to know your test results and keep a list of the medicines you take. How can you care for yourself at home? · Take pain medicines exactly as directed. ? If the doctor gave you a prescription medicine for pain, take it as prescribed. ? If you are not taking a prescription pain medicine, take an over-the-counter medicine, such as acetaminophen (Tylenol), ibuprofen (Advil, Motrin), or naproxen (Aleve). Read and follow all instructions on the label. ? Do not take two or more pain medicines at the same time unless the doctor told you to. Many pain medicines have acetaminophen, which is Tylenol. Too much acetaminophen (Tylenol) can be harmful. · Plan to take a full dose of pain reliever before bedtime. Getting enough sleep will help you get better. · Try a warm, moist washcloth on the ear. It may help relieve pain. · If your doctor prescribed antibiotics, take them as directed. Do not stop taking them just because you feel better. You need to take the full course of antibiotics. When should you call for help?    Call your doctor now or seek immediate medical care if:    · You have new or increasing ear pain.     · You have new or increasing pus or blood draining from your ear.     · You have a fever with a stiff neck or a severe headache. Watch closely for changes in your health, and be sure to contact your doctor if:    · You have new or worse symptoms.     · You are not getting better after taking an antibiotic for 2 days. Where can you learn more? Go to [email or web address]://[email or web address] org and sign in to your [employer] account. Enter B832 in the [employer] box to learn more about \"Ear Infection (Otitis Media): Care Instructions. \"     If you do not have an account, please click on the \"Sign Up Now\" link. Current as of: April 15, 2020               Content Version: 12.6  © 4480-4341 [employer]. Care instructions adapted under license by [facility] ([facility]). If you have questions about a medical condition or this instruction, always ask your healthcare professional. [place] [address] any warranty or liability for your use of this information. Learning About Coronavirus [phone])  Coronavirus [phone]): Overview  What is coronavirus (COVID-19)? The coronavirus disease (COVID-19) is caused by a virus. It is an illness that was first found in December 2019. It has since spread worldwide. The virus can cause fever, cough, and trouble breathing. In severe cases, it can cause pneumonia and make it hard to breathe without help. It can cause death. This virus spreads person-to-person through droplets from coughing and sneezing. It can also spread when you are close to someone who is infected. And it can spread when you touch something that has the virus on it, such as a doorknob or a tabletop. Coronaviruses are a large group of viruses. They cause the common cold. They also cause more serious illnesses like Middle East respiratory syndrome (MERS) and severe acute respiratory syndrome (SARS). COVID-19 is caused by a novel coronavirus.  That means it's a new type that has not been seen in people before. How is COVID-19 treated? Mild illness can be treated at home, but more serious illness needs to be treated in the hospital. Treatment may include medicines to reduce symptoms, plus breathing support such as oxygen therapy or a ventilator. Other treatments, such as antiviral medicines, may help people who have COVID-19. What can you do to protect yourself from COVID-19? The best way to protect yourself from getting sick is to:  · Avoid areas where there is an outbreak. · Avoid contact with people who may be infected. · Avoid crowds and try to stay at least 6 feet away from other people. · Wash your hands often, especially after you cough or sneeze. Use soap and water, and scrub for at least 20 seconds. If soap and water aren't available, use an alcohol-based hand . · Avoid touching your mouth, nose, and eyes. What can you do to avoid spreading the virus to others? To help avoid spreading the virus to others:  · Wash your hands often with soap or alcohol-based hand sanitizers. · Cover your mouth with a tissue when you cough or sneeze. Then throw the tissue in the trash. · Use a disinfectant to clean things that you touch often. These include doorknobs, remote controls, phones, and handles on your refrigerator and microwave. And don't forget countertops, tabletops, bathrooms, and computer keyboards. · Wear a cloth face cover if you have to go to public areas. If you know or suspect that you have COVID-19:  · Stay home. Don't go to school, work, or public areas. And don't use public transportation, ride-shares, or taxis unless you have no choice. · Leave your home only if you need to get medical care or testing. But call the doctor's office first so they know you're coming. And wear a face cover. · Limit contact with people in your home. If possible, stay in a separate bedroom and use a separate bathroom.   · Wear a face cover whenever you're around other people. It can help stop the spread of the virus when you cough or sneeze. · Clean and disinfect your home every day. Use household  and disinfectant wipes or sprays. Take special care to clean things that you grab with your hands. · Self-isolate until it's safe to be around others again. ? If you have symptoms, it's safe when you haven't had a fever for 3 days and your symptoms have improved and it's been at least 10 days since your symptoms started. ? If you were exposed to the virus but don't have symptoms, it's safe to be around others 14 days after exposure. ? Talk to your doctor about whether you also need testing, especially if you have a weakened immune system. When to call for help  Call 911 anytime you think you may need emergency care. For example, call if:  · You have severe trouble breathing. (You can't talk at all.)  · You have constant chest pain or pressure. · You are severely dizzy or lightheaded. · You are confused or can't think clearly. · Your face and lips have a blue color. · You passed out (lost consciousness) or are very hard to wake up. Call your doctor now if you develop symptoms such as:  · Shortness of breath. · Fever. · Cough. If you need to get care, call ahead to the doctor's office for instructions before you go. Make sure you wear a face cover to prevent exposing other people to the virus. Where can you get the latest information? The following health organizations are tracking and studying this virus. Their websites contain the most up-to-date information. [place] also learn what to do if you think you may have been exposed to the virus. · U.S. Centers for Disease Control and Prevention (CDC): The CDC provides updated news about the disease and travel advice. The website also tells you how to prevent the spread of infection. www.cdc.gov  · World Health Organization [facility]): WHO offers information about the virus outbreaks. WHO also has travel advice. www.who.int  Current as of: July 10, 2020               Content Version: 12.6  © 0501-1139 [employer]. Care instructions adapted under license by [facility] ([facility]). If you have questions about a medical condition or this instruction, always ask your healthcare professional. [address] any warranty or liability for your use of this information.

## 2021-01-01 NOTE — PROGRESS NOTES
[address] [facility]   [address], [address]     Phone:  [phone]  Fax:  [phone]      Lawrence Mujica is a 26 y.o. female who presents today for her medical conditions/complaints as noted below. Lawrence Mujica is c/o of Taste Change (onset this morning, states things dont taste like they should) and Dizziness      Chief Complaint   Patient presents with    Taste Change     onset this morning, states things dont taste like they should    Dizziness       HPI:     HPI    Lawrence Mujica presents today for altered and decreased taste. She also states she has been light headed. She was sent home today due to symptoms. She works at [employer]. She has been around the general public. She denies cough, congestion, fevers, or GI complaints. She has not taken medications prior to arrival. She has had dizziness in the past due to dehydration. She states she does not drink much water. She feels well at this time. Past Medical History:   Diagnosis Date    ADHD (attention deficit hyperactivity disorder)     Miscarriage     10/2020    Varicella         Past Surgical History:   Procedure Laterality Date    DENTAL SURGERY      patient reports oral surgery 3-4 years ago       Social History     Tobacco Use    Smoking status: Never Smoker    Smokeless tobacco: Never Used   Substance Use Topics    Alcohol use: No        Current Outpatient Medications   Medication Sig Dispense Refill    amoxicillin-clavulanate (AUGMENTIN) 875-125 MG per tablet Take 1 tablet by mouth 2 times daily for 7 days 14 tablet 0     No current facility-administered medications for this visit.         No Known Allergies    Family History   Problem Relation Age of Onset    High Blood Pressure Maternal Grandmother     Diabetes Maternal Grandmother     COPD Maternal Grandmother     High Blood Pressure Maternal Grandfather     Diabetes Maternal Grandfather                Review of Systems   Constitutional: Negative for fatigue and fever. HENT: Negative for congestion, ear discharge, ear pain, sinus pressure and sinus pain. Anosmia   Eyes: Negative. Respiratory: Negative for cough, shortness of breath, wheezing and stridor. Cardiovascular: Negative for chest pain, palpitations and leg swelling. Gastrointestinal: Negative. Genitourinary: Negative. Musculoskeletal: Negative. Neurological: Positive for light-headedness. Objective:     Physical Exam  Vitals signs and nursing note reviewed. Constitutional:       General: She is not in acute distress. Appearance: Normal appearance. She is not ill-appearing, toxic-appearing or diaphoretic. HENT:      Head: Normocephalic and atraumatic. Right Ear: Ear canal and external ear normal. A middle ear effusion is present. There is no impacted cerumen. Tympanic membrane is erythematous. Left Ear: Ear canal and external ear normal. A middle ear effusion is present. There is no impacted cerumen. Tympanic membrane is erythematous. Nose: Nose normal. No congestion or rhinorrhea. Mouth/Throat:      Pharynx: Oropharynx is clear. No oropharyngeal exudate or posterior oropharyngeal erythema. Eyes:      General:         Right eye: No discharge. Left eye: No discharge. Conjunctiva/sclera: Conjunctivae normal.   Neck:      Musculoskeletal: Normal range of motion and neck supple. Cardiovascular:      Rate and Rhythm: Normal rate. Pulmonary:      Effort: Pulmonary effort is normal. No respiratory distress. Breath sounds: Normal breath sounds. No wheezing, rhonchi or rales. Musculoskeletal: Normal range of motion. Lymphadenopathy:      Cervical: No cervical adenopathy. Skin:     General: Skin is warm and dry. Capillary Refill: Capillary refill takes less than 2 seconds. Neurological:      Mental Status: She is alert and oriented to person, place, and time. Motor: No weakness.       Gait: Gait normal. Psychiatric:         Mood and Affect: Mood normal.         Behavior: Behavior normal.         /66   Pulse 83   Temp 98 °F (36.7 °C) (Temporal)   Resp 18   Ht 5' 2\" (1.575 m)   Wt 135 lb (61.2 kg)   LMP 12/04/2020   SpO2 98%   BMI 24.69 kg/m²     Assessment:      Diagnosis Orders   1. Anosmia     2. OME (otitis media with effusion), bilateral  amoxicillin-clavulanate (AUGMENTIN) 875-125 MG per tablet       No results found for this visit on 01/01/21. Plan:     Start Augmentin for ear infection    Ok to test for COVID due to high exposure to the public in  and anosmia    Quarantine until results return. Return if symptoms worsen or fail to improve. No orders of the defined types were placed in this encounter. Orders Placed This Encounter   Medications    amoxicillin-clavulanate (AUGMENTIN) 875-125 MG per tablet     Sig: Take 1 tablet by mouth 2 times daily for 7 days     Dispense:  14 tablet     Refill:  0        Patient offered educational materials - see patient instructions for any instruction needed. Discussed use, benefit, and side effects of prescribed medications. All patient questions answered. Instructed to continue current medications, diet and exercise. Patient agreed with treatment plan. Follow up as directed. Patient was advised to go to the ED if condition ever becomes emergent.        Electronically signed by USAMA Quinteros on 1/1/2021 at 12:29 PM

## 2021-01-04 LAB — SARS-COV-2, NAA: NOT DETECTED

## 2021-01-06 ENCOUNTER — HOSPITAL ENCOUNTER (OUTPATIENT)
Dept: NEUROLOGY | Age: 26
Discharge: HOME | End: 2021-01-06
Payer: COMMERCIAL

## 2021-01-06 DIAGNOSIS — R20.2 NUMBNESS AND TINGLING IN RIGHT HAND: ICD-10-CM

## 2021-01-06 DIAGNOSIS — R20.0 NUMBNESS AND TINGLING IN RIGHT HAND: ICD-10-CM

## 2021-01-06 PROCEDURE — 95910 NRV CNDJ TEST 7-8 STUDIES: CPT | Performed by: PSYCHIATRY & NEUROLOGY

## 2021-01-06 PROCEDURE — 95886 MUSC TEST DONE W/N TEST COMP: CPT

## 2021-01-06 PROCEDURE — 95910 NRV CNDJ TEST 7-8 STUDIES: CPT

## 2021-01-06 PROCEDURE — 95886 MUSC TEST DONE W/N TEST COMP: CPT | Performed by: PSYCHIATRY & NEUROLOGY

## 2021-04-06 ENCOUNTER — NURSE TRIAGE (OUTPATIENT)
Dept: OTHER | Facility: CLINIC | Age: 26
End: 2021-04-06

## 2021-04-06 NOTE — TELEPHONE ENCOUNTER
ehsan     11. PREGNANCY: \"Is there any chance you are pregnant? \" (e.g., yes, no; LMP)         Possible but doubt it    Protocols used: BACK PAIN-ADULT-[place]    Received call from  at pre-service center [facility] with Red Flag Complaint. Brief description of triage: see above     Triage indicates for patient to be seen today for back pain. Care advice provided, patient verbalizes understanding; denies any other questions or concerns; instructed to call back for any new or worsening symptoms. Writer provided warm transfer to Karel Heck at [facility] for appointment scheduling. Attention Provider: Thank you for allowing me to participate in the care of your patient. The patient was connected to triage in response to information provided to the ECC. Please do not respond through this encounter as the response is not directed to a shared pool.

## 2021-04-08 ENCOUNTER — OFFICE VISIT (OUTPATIENT)
Dept: PRIMARY CARE CLINIC | Age: 26
End: 2021-04-08
Payer: COMMERCIAL

## 2021-04-08 VITALS
SYSTOLIC BLOOD PRESSURE: 112 MMHG | DIASTOLIC BLOOD PRESSURE: 70 MMHG | HEART RATE: 97 BPM | BODY MASS INDEX: 25.88 KG/M2 | WEIGHT: 140.6 LBS | HEIGHT: 62 IN | TEMPERATURE: 97.7 F | OXYGEN SATURATION: 98 %

## 2021-04-08 DIAGNOSIS — Z87.59 MISCARRIAGE WITHIN LAST 12 MONTHS: ICD-10-CM

## 2021-04-08 DIAGNOSIS — M54.6 CHRONIC BILATERAL THORACIC BACK PAIN: ICD-10-CM

## 2021-04-08 DIAGNOSIS — M54.50 ACUTE BILATERAL LOW BACK PAIN WITHOUT SCIATICA: Primary | ICD-10-CM

## 2021-04-08 DIAGNOSIS — M41.9 SCOLIOSIS OF THORACOLUMBAR SPINE, UNSPECIFIED SCOLIOSIS TYPE: ICD-10-CM

## 2021-04-08 DIAGNOSIS — G89.29 CHRONIC BILATERAL THORACIC BACK PAIN: ICD-10-CM

## 2021-04-08 DIAGNOSIS — F32.1 CURRENT MODERATE EPISODE OF MAJOR DEPRESSIVE DISORDER, UNSPECIFIED WHETHER RECURRENT (HCC): ICD-10-CM

## 2021-04-08 PROCEDURE — 99214 OFFICE O/P EST MOD 30 MIN: CPT | Performed by: NURSE PRACTITIONER

## 2021-04-08 RX ORDER — CYCLOBENZAPRINE HCL 10 MG
10 TABLET ORAL 3 TIMES DAILY PRN
Qty: 30 TABLET | Refills: 0 | Status: SHIPPED | OUTPATIENT
Start: 2021-04-08 | End: 2021-04-18

## 2021-04-08 RX ORDER — FLUOXETINE 10 MG/1
10 CAPSULE ORAL DAILY
Qty: 30 CAPSULE | Refills: 3 | Status: SHIPPED | OUTPATIENT
Start: 2021-04-08 | End: 2021-11-09 | Stop reason: SDUPTHER

## 2021-04-08 ASSESSMENT — ENCOUNTER SYMPTOMS
BACK PAIN: 1
RESPIRATORY NEGATIVE: 1
GASTROINTESTINAL NEGATIVE: 1

## 2021-04-08 ASSESSMENT — PATIENT HEALTH QUESTIONNAIRE - PHQ9
SUM OF ALL RESPONSES TO PHQ QUESTIONS 1-9: 2
SUM OF ALL RESPONSES TO PHQ9 QUESTIONS 1 & 2: 2
2. FEELING DOWN, DEPRESSED OR HOPELESS: 1

## 2021-04-08 NOTE — PROGRESS NOTES
[facility] PHYSICIAN SERVICES  [facility]  [address]  [address] [address]  Dept: [phone]  Dept Fax: [phone]  Loc: [phone]    Dinorah Madsen is a 26 y.o. female who presents today for her medical conditions/complaints as noted below. Dinorah Madsen is c/o of Back Pain (Lower and upper back pain which radiates into the shoulders. Two year history wax/wanes. Recently worse for the past one month.  ) and Depression (Almost everyone around her has stated she may be bipolar for almost one year.  )        HPI:     HPI   Chief Complaint   Patient presents with    Back Pain     Lower and upper back pain which radiates into the shoulders. Two year history wax/wanes. Recently worse for the past one month.  Depression     Almost everyone around her has stated she may be bipolar for almost one year. She had seen Dr. Lex petit, the pediatrician about 5 years ago and had x-ray showing some scoliosis. She presents today with pain in her upper back and low back has been going on for about 2 years but worse in the last month. She is working multiple shifts at [employer] on her feet more than 40 hours a week. She tries to wear good shoes and tries to do lots of stretching. It does not radiate down either leg. The pain is mild. She has not tried any medication for it. She did have a miscarriage last year and has had some depression and anxiety since. Some of her friends at work told her they thought she was bipolar because she has extreme highs and lows. She is still with her significant other and they would like to have another baby eventually. She has not been on any antidepressants. She does agree that she probably needs some counseling. She does not do unusual or dangerous things. She just feels happy some days and sad others. She denies any suicidal thoughts.   Past Medical History:   Diagnosis Date    ADHD (attention deficit hyperactivity disorder)     Back pain 2019    Depression     Miscarriage     10/2020    Shoulder pain 2019    Varicella       Past Surgical History:   Procedure Laterality Date    DENTAL SURGERY      patient reports oral surgery 3-4 years ago       Vitals 4/8/2021 1/1/2021 12/10/2020 9/21/2020 12/1/2017 13/7/2416   SYSTOLIC 645 [phone] -   DIASTOLIC 70 66 82 70 74 -   Site Left Upper Arm - Left Upper Arm - - -   Position - - Sitting - - -   Cuff Size - - Medium Adult - - -   Pulse 97 83 104 90 112 -   Temp 97.7 98 97.2 97.7 99.2 -   Resp - 18 16 18 20 -   SpO2 98 98 98 99 99 -   Weight 140 lb 9.6 oz 135 lb 131 lb 128 lb 12.8 oz - -   Height 5' 2\" 5' 2\" 5' 2\" 5' 2\" - -   Body mass index 25.71 kg/m2 24.69 kg/m2 23.96 kg/m2 23.56 kg/m2 - -   Pain Level - - - - - 5   Some recent data might be hidden       Family History   Problem Relation Age of Onset    High Blood Pressure Maternal Grandmother     Diabetes Maternal Grandmother     COPD Maternal Grandmother     High Blood Pressure Maternal Grandfather     Diabetes Maternal Grandfather        Social History     Tobacco Use    Smoking status: Never Smoker    Smokeless tobacco: Never Used   Substance Use Topics    Alcohol use: No      Current Outpatient Medications   Medication Sig Dispense Refill    diclofenac (VOLTAREN) 50 MG EC tablet Take 1 tablet by mouth 3 times daily (with meals) for 14 days 42 tablet 0    cyclobenzaprine (FLEXERIL) 10 MG tablet Take 1 tablet by mouth 3 times daily as needed for Muscle spasms 30 tablet 0    FLUoxetine (PROZAC) 10 MG capsule Take 1 capsule by mouth daily 30 capsule 3     No current facility-administered medications for this visit.       No Known Allergies    Health Maintenance   Topic Date Due    Hepatitis C screen  Never done    HIV screen  Never done    COVID-19 Vaccine (1) Never done    Chlamydia screen  01/07/2017    Cervical cancer screen  Never done    Varicella vaccine (2 of 2 - 13+ 2-dose series) 12/28/2018    Flu vaccine (Season Ended) 09/21/2021 (Originally 9/1/2021)    DTaP/Tdap/Td vaccine (7 - Td) 11/29/2028    Hepatitis B vaccine  Completed    Hib vaccine  Completed    HPV vaccine  Completed    Meningococcal (ACWY) vaccine  Completed    Hepatitis A vaccine  Aged Out    Pneumococcal 0-64 years Vaccine  Aged Out       Subjective:      Review of Systems   Constitutional: Positive for activity change. HENT: Negative. Respiratory: Negative. Cardiovascular: Negative. Gastrointestinal: Negative. Genitourinary: Negative. Musculoskeletal: Positive for back pain. Psychiatric/Behavioral: Positive for decreased concentration and dysphoric mood. Negative for agitation, behavioral problems, confusion, hallucinations, self-injury, sleep disturbance and suicidal ideas. The patient is nervous/anxious. The patient is not hyperactive. Objective:     Physical Exam  Vitals signs and nursing note reviewed. Constitutional:       Appearance: She is well-developed. HENT:      Head: Normocephalic. Right Ear: External ear normal.      Left Ear: External ear normal.   Eyes:      Pupils: Pupils are equal, round, and reactive to light. Neck:      Musculoskeletal: Normal range of motion. Cardiovascular:      Rate and Rhythm: Normal rate and regular rhythm. Heart sounds: Normal heart sounds. Pulmonary:      Effort: Pulmonary effort is normal.      Breath sounds: Normal breath sounds. Musculoskeletal:      Lumbar back: She exhibits tenderness. She exhibits normal range of motion, no bony tenderness, no swelling, no edema, no deformity, no laceration, no pain, no spasm and normal pulse. Back:    Skin:     General: Skin is warm and dry. Capillary Refill: Capillary refill takes less than 2 seconds. Neurological:      General: No focal deficit present. Mental Status: She is alert and oriented to person, place, and time.    Psychiatric:         Mood and Affect: Mood normal.         Behavior: Behavior normal.         Thought Content: Thought content normal.         Judgment: Judgment normal.       /70 (Site: Left Upper Arm)   Pulse 97   Temp 97.7 °F (36.5 °C)   Ht 5' 2\" (1.575 m)   Wt 140 lb 9.6 oz (63.8 kg)   SpO2 98%   BMI 25.72 kg/m²     Assessment:       Diagnosis Orders   1. Acute bilateral low back pain without sciatica  XR LUMBAR SPINE (2-3 VIEWS)   2. Chronic bilateral thoracic back pain  XR THORACIC SPINE (3 VIEWS)   3. Scoliosis of thoracolumbar spine, unspecified scoliosis type  XR THORACIC SPINE (3 VIEWS)    XR LUMBAR SPINE (2-3 VIEWS)   4. Current moderate episode of major depressive disorder, unspecified whether recurrent [facility])  [address], [place], Psychiatry, [facility] [place]   5. Miscarriage within last 13 months  [address], [place], Psychiatry, [place]         Plan:   More than 50% of the time was spent counseling and coordinating care for a total time of 35min face to face. Patient given educational materials -see patient instructions. Discussed use, benefit, and side effects of prescribed medications. All patient questions answered. Pt voiced understanding. Reviewed health maintenance. Instructed to continue currentmedications, diet and exercise. Patient agreed with treatment plan. Follow up as directed.    MEDICATIONS:  Orders Placed This Encounter   Medications    diclofenac (VOLTAREN) 50 MG EC tablet     Sig: Take 1 tablet by mouth 3 times daily (with meals) for 14 days     Dispense:  42 tablet     Refill:  0    cyclobenzaprine (FLEXERIL) 10 MG tablet     Sig: Take 1 tablet by mouth 3 times daily as needed for Muscle spasms     Dispense:  30 tablet     Refill:  0    FLUoxetine (PROZAC) 10 MG capsule     Sig: Take 1 capsule by mouth daily     Dispense:  30 capsule     Refill:  3         ORDERS:  Orders Placed This Encounter   Procedures    XR THORACIC SPINE (3 VIEWS)    XR LUMBAR SPINE (2-3 VIEWS)   [address] [facility],[address], [place], Psychiatry, [facility] [place] Follow-up:  Return in about 2 weeks (around 4/22/2021) for  f/u meds and back dr Flex Hawk. PATIENT INSTRUCTIONS:  Patient Instructions     Xray today see exercise below  Diclofenac with food for back Inflammation  Muscle relaxer at home, if drowsy only when at home  Start the medication, prozac   You must take this medication daily. It will take about 10 days for you to notice a difference. If depression worsens or no emotion stop the medication and call me. The most common side effect is some nausea but this should subside in a few days. Counseling with [facility]      Patient Education        Back Stretches: Exercises  Introduction  Here are some examples of exercises for stretching your back. Start each exercise slowly. Ease off the exercise if you start to have pain. Your doctor or physical therapist will tell you when you can start these exercises and which ones will work best for you. How to do the exercises  Overhead stretch   1. Stand comfortably with your feet shoulder-width apart. 2. Looking straight ahead, raise both arms over your head and reach toward the ceiling. Do not allow your head to tilt back. 3. Hold for 15 to 30 seconds, then lower your arms to your sides. 4. Repeat 2 to 4 times. Side stretch   1. Stand comfortably with your feet shoulder-width apart. 2. Raise one arm over your head, and then lean to the other side. 3. Slide your hand down your leg as you let the weight of your arm gently stretch your side muscles. Hold for 15 to 30 seconds. 4. Repeat 2 to 4 times on each side. Press-up   1. Lie on your stomach, supporting your body with your forearms. 2. Press your elbows down into the floor to raise your upper back. As you do this, relax your stomach muscles and allow your back to arch without using your back muscles. As your press up, do not let your hips or pelvis come off the floor. 3. Hold for 15 to 30 seconds, then relax. 4. Repeat 2 to 4 times.     Relax and rest   1. Lie on your back with a rolled towel under your neck and a pillow under your knees. Extend your arms comfortably to your sides. 2. Relax and breathe normally. 3. Remain in this position for about 10 minutes. 4. If you can, do this 2 or 3 times each day. Follow-up care is a key part of your treatment and safety. Be sure to make and go to all appointments, and call your doctor if you are having problems. It's also a good idea to know your test results and keep a list of the medicines you take. Where can you learn more? Go to https://ThriveOnpeFightMeeb.uberVU. org and sign in to your Spinzo account. Enter A933 in the GardenStory box to learn more about \"Back Stretches: Exercises. \"     If you do not have an account, please click on the \"Sign Up Now\" link. Current as of: November 16, 2020               Content Version: 12.8  © 2006-2021 Healthwise, [employer]. Care instructions adapted under license by [facility] ([facility]). If you have questions about a medical condition or this instruction, always ask your healthcare professional. [place] [address] any warranty or liability for your use of this information. Electronically signed by USAMA Ellison CNP on 4/8/2021 at 5:13 PM    EMR Dragon/transcription disclaimer:  Much of thisencounter note is electronic transcription/translation of spoken language to printed texts. The electronic translation of spoken language may be erroneous, or at times, nonsensical words or phrases may be inadvertentlytranscribed.   Although I have reviewed the note for such errors, some may still exist.

## 2021-04-08 NOTE — PATIENT INSTRUCTIONS
Xray today see exercise below  Diclofenac with food for back Inflammation  Muscle relaxer at home, if drowsy only when at home  Start the medication, prozac   You must take this medication daily. It will take about 10 days for you to notice a difference. If depression worsens or no emotion stop the medication and call me. The most common side effect is some nausea but this should subside in a few days. Counseling with [facility]      Patient Education        Back Stretches: Exercises  Introduction  Here are some examples of exercises for stretching your back. Start each exercise slowly. Ease off the exercise if you start to have pain. Your doctor or physical therapist will tell you when you can start these exercises and which ones will work best for you. How to do the exercises  Overhead stretch   1. Stand comfortably with your feet shoulder-width apart. 2. Looking straight ahead, raise both arms over your head and reach toward the ceiling. Do not allow your head to tilt back. 3. Hold for 15 to 30 seconds, then lower your arms to your sides. 4. Repeat 2 to 4 times. Side stretch   1. Stand comfortably with your feet shoulder-width apart. 2. Raise one arm over your head, and then lean to the other side. 3. Slide your hand down your leg as you let the weight of your arm gently stretch your side muscles. Hold for 15 to 30 seconds. 4. Repeat 2 to 4 times on each side. Press-up   1. Lie on your stomach, supporting your body with your forearms. 2. Press your elbows down into the floor to raise your upper back. As you do this, relax your stomach muscles and allow your back to arch without using your back muscles. As your press up, do not let your hips or pelvis come off the floor. 3. Hold for 15 to 30 seconds, then relax. 4. Repeat 2 to 4 times. Relax and rest   1. Lie on your back with a rolled towel under your neck and a pillow under your knees. Extend your arms comfortably to your sides.   2. Relax and breathe normally. 3. Remain in this position for about 10 minutes. 4. If you can, do this 2 or 3 times each day. Follow-up care is a key part of your treatment and safety. Be sure to make and go to all appointments, and call your doctor if you are having problems. It's also a good idea to know your test results and keep a list of the medicines you take. Where can you learn more? Go to https://Spaceport.io.Smithfield Case. org and sign in to your Negorama account. Enter N192 in the Code Kingdoms box to learn more about \"Back Stretches: Exercises. \"     If you do not have an account, please click on the \"Sign Up Now\" link. Current as of: November 16, 2020               Content Version: 12.8  © 5972-1947 Healthwise, Incorporated. Care instructions adapted under license by [facility] ([facility]). If you have questions about a medical condition or this instruction, always ask your healthcare professional. [place] [address] any warranty or liability for your use of this information.

## 2021-05-06 ENCOUNTER — TELEPHONE (OUTPATIENT)
Dept: PSYCHIATRY | Age: 26
End: 2021-05-06

## 2021-05-13 ENCOUNTER — TELEPHONE (OUTPATIENT)
Dept: PSYCHIATRY | Age: 26
End: 2021-05-13

## 2021-05-13 NOTE — TELEPHONE ENCOUNTER
Called pt was a no show. Called to check on them and Pt reported they no longer needed services. She has [place] out of state.     Electronically signed by Brooke Tariq MA on 5/13/2021 at 3:18 PM

## 2021-09-17 ENCOUNTER — HOSPITAL ENCOUNTER (EMERGENCY)
Facility: HOSPITAL | Age: 26
Discharge: HOME | End: 2021-09-17
Attending: EMERGENCY MEDICINE | Admitting: EMERGENCY MEDICINE

## 2021-09-17 VITALS
WEIGHT: 142 LBS | SYSTOLIC BLOOD PRESSURE: 117 MMHG | DIASTOLIC BLOOD PRESSURE: 86 MMHG | TEMPERATURE: 97.5 F | RESPIRATION RATE: 16 BRPM | HEART RATE: 90 BPM | BODY MASS INDEX: 26.13 KG/M2 | HEIGHT: 62 IN | OXYGEN SATURATION: 97 %

## 2021-09-17 DIAGNOSIS — N30.90 CYSTITIS: Primary | ICD-10-CM

## 2021-09-17 DIAGNOSIS — R51.9 NONINTRACTABLE HEADACHE, UNSPECIFIED CHRONICITY PATTERN, UNSPECIFIED HEADACHE TYPE: ICD-10-CM

## 2021-09-17 LAB
ALBUMIN SERPL-MCNC: 4.5 G/DL (ref 3.5–5.2)
ALBUMIN/GLOB SERPL: 1.4 G/DL
ALP SERPL-CCNC: 89 U/L (ref 39–117)
ALT SERPL W P-5'-P-CCNC: 9 U/L (ref 1–33)
ANION GAP SERPL CALCULATED.3IONS-SCNC: 8 MMOL/L (ref 5–15)
AST SERPL-CCNC: 13 U/L (ref 1–32)
B-HCG UR QL: NEGATIVE
BACTERIA UR QL AUTO: ABNORMAL /HPF
BASOPHILS # BLD AUTO: 0.06 10*3/MM3 (ref 0–0.2)
BASOPHILS NFR BLD AUTO: 0.8 % (ref 0–1.5)
BILIRUB SERPL-MCNC: 0.3 MG/DL (ref 0–1.2)
BILIRUB UR QL STRIP: NEGATIVE
BUN SERPL-MCNC: 12 MG/DL (ref 6–20)
BUN/CREAT SERPL: 27.3 (ref 7–25)
CALCIUM SPEC-SCNC: 9.4 MG/DL (ref 8.6–10.5)
CHLORIDE SERPL-SCNC: 103 MMOL/L (ref 98–107)
CLARITY UR: CLEAR
CO2 SERPL-SCNC: 26 MMOL/L (ref 22–29)
COLOR UR: YELLOW
CREAT SERPL-MCNC: 0.44 MG/DL (ref 0.57–1)
DEPRECATED RDW RBC AUTO: 42.5 FL (ref 37–54)
EOSINOPHIL # BLD AUTO: 0.16 10*3/MM3 (ref 0–0.4)
EOSINOPHIL NFR BLD AUTO: 2.3 % (ref 0.3–6.2)
ERYTHROCYTE [DISTWIDTH] IN BLOOD BY AUTOMATED COUNT: 12.9 % (ref 12.3–15.4)
GFR SERPL CREATININE-BSD FRML MDRD: >150 ML/MIN/1.73
GLOBULIN UR ELPH-MCNC: 3.3 GM/DL
GLUCOSE SERPL-MCNC: 91 MG/DL (ref 65–99)
GLUCOSE UR STRIP-MCNC: NEGATIVE MG/DL
HCG SERPL QL: NEGATIVE
HCT VFR BLD AUTO: 40 % (ref 34–46.6)
HGB BLD-MCNC: 13.2 G/DL (ref 12–15.9)
HGB UR QL STRIP.AUTO: ABNORMAL
HOLD SPECIMEN: NORMAL
HYALINE CASTS UR QL AUTO: ABNORMAL /LPF
IMM GRANULOCYTES # BLD AUTO: 0.02 10*3/MM3 (ref 0–0.05)
IMM GRANULOCYTES NFR BLD AUTO: 0.3 % (ref 0–0.5)
INTERNAL NEGATIVE CONTROL: NEGATIVE
INTERNAL POSITIVE CONTROL: POSITIVE
KETONES UR QL STRIP: NEGATIVE
LEUKOCYTE ESTERASE UR QL STRIP.AUTO: ABNORMAL
LIPASE SERPL-CCNC: 15 U/L (ref 13–60)
LYMPHOCYTES # BLD AUTO: 0.98 10*3/MM3 (ref 0.7–3.1)
LYMPHOCYTES NFR BLD AUTO: 13.8 % (ref 19.6–45.3)
Lab: NORMAL
MCH RBC QN AUTO: 29.7 PG (ref 26.6–33)
MCHC RBC AUTO-ENTMCNC: 33 G/DL (ref 31.5–35.7)
MCV RBC AUTO: 89.9 FL (ref 79–97)
MONOCYTES # BLD AUTO: 0.8 10*3/MM3 (ref 0.1–0.9)
MONOCYTES NFR BLD AUTO: 11.3 % (ref 5–12)
NEUTROPHILS NFR BLD AUTO: 5.08 10*3/MM3 (ref 1.7–7)
NEUTROPHILS NFR BLD AUTO: 71.5 % (ref 42.7–76)
NITRITE UR QL STRIP: POSITIVE
NRBC BLD AUTO-RTO: 0 /100 WBC (ref 0–0.2)
PH UR STRIP.AUTO: <=5 [PH] (ref 5–8)
PLATELET # BLD AUTO: 244 10*3/MM3 (ref 140–450)
PMV BLD AUTO: 10.2 FL (ref 6–12)
POTASSIUM SERPL-SCNC: 4.4 MMOL/L (ref 3.5–5.2)
PROT SERPL-MCNC: 7.8 G/DL (ref 6–8.5)
PROT UR QL STRIP: NEGATIVE
RBC # BLD AUTO: 4.45 10*6/MM3 (ref 3.77–5.28)
RBC # UR: ABNORMAL /HPF
REF LAB TEST METHOD: ABNORMAL
SODIUM SERPL-SCNC: 137 MMOL/L (ref 136–145)
SP GR UR STRIP: 1.02 (ref 1–1.03)
SQUAMOUS #/AREA URNS HPF: ABNORMAL /HPF
UROBILINOGEN UR QL STRIP: ABNORMAL
WBC # BLD AUTO: 7.1 10*3/MM3 (ref 3.4–10.8)
WBC UR QL AUTO: ABNORMAL /HPF
WHOLE BLOOD HOLD SPECIMEN: NORMAL
WHOLE BLOOD HOLD SPECIMEN: NORMAL

## 2021-09-17 PROCEDURE — 81025 URINE PREGNANCY TEST: CPT

## 2021-09-17 PROCEDURE — 85025 COMPLETE CBC W/AUTO DIFF WBC: CPT | Performed by: EMERGENCY MEDICINE

## 2021-09-17 PROCEDURE — 25010000002 KETOROLAC TROMETHAMINE PER 15 MG: Performed by: EMERGENCY MEDICINE

## 2021-09-17 PROCEDURE — 83690 ASSAY OF LIPASE: CPT | Performed by: EMERGENCY MEDICINE

## 2021-09-17 PROCEDURE — 99283 EMERGENCY DEPT VISIT LOW MDM: CPT

## 2021-09-17 PROCEDURE — 87491 CHLMYD TRACH DNA AMP PROBE: CPT | Performed by: EMERGENCY MEDICINE

## 2021-09-17 PROCEDURE — 87591 N.GONORRHOEAE DNA AMP PROB: CPT | Performed by: EMERGENCY MEDICINE

## 2021-09-17 PROCEDURE — 81025 URINE PREGNANCY TEST: CPT | Performed by: EMERGENCY MEDICINE

## 2021-09-17 PROCEDURE — 80053 COMPREHEN METABOLIC PANEL: CPT | Performed by: EMERGENCY MEDICINE

## 2021-09-17 PROCEDURE — 84703 CHORIONIC GONADOTROPIN ASSAY: CPT | Performed by: EMERGENCY MEDICINE

## 2021-09-17 PROCEDURE — 96372 THER/PROPH/DIAG INJ SC/IM: CPT

## 2021-09-17 PROCEDURE — 81001 URINALYSIS AUTO W/SCOPE: CPT | Performed by: EMERGENCY MEDICINE

## 2021-09-17 RX ORDER — CEFDINIR 300 MG/1
300 CAPSULE ORAL ONCE
Status: COMPLETED | OUTPATIENT
Start: 2021-09-17 | End: 2021-09-17

## 2021-09-17 RX ORDER — KETOROLAC TROMETHAMINE 30 MG/ML
30 INJECTION, SOLUTION INTRAMUSCULAR; INTRAVENOUS ONCE
Status: COMPLETED | OUTPATIENT
Start: 2021-09-17 | End: 2021-09-17

## 2021-09-17 RX ORDER — SODIUM CHLORIDE 0.9 % (FLUSH) 0.9 %
10 SYRINGE (ML) INJECTION AS NEEDED
Status: DISCONTINUED | OUTPATIENT
Start: 2021-09-17 | End: 2021-09-17 | Stop reason: HOSPADM

## 2021-09-17 RX ADMIN — KETOROLAC TROMETHAMINE 30 MG: 30 INJECTION, SOLUTION INTRAMUSCULAR; INTRAVENOUS at 17:16

## 2021-09-17 RX ADMIN — CEFDINIR 300 MG: 300 CAPSULE ORAL at 17:17

## 2021-09-17 NOTE — ED PROVIDER NOTES
Subjective   25-year-old female presents to the ER with complaint of suprapubic pain.  She also complains of headaches.  She has a history of frequent headaches, carries no headache diagnosis.  She states her headache started last week, has occurred almost daily.  Not the worst headache of her life.  No associated vision change, focal neurologic deficit, gait instability, vertigo.  Has taken over-the-counter Tylenol which has helped with her symptoms.  Also complains of suprapubic pain.  This began yesterday.  Does have history of frequent urinary tract infection.  No associated dysuria hematuria.  Also has a history of gonorrhea, HSV, urogenital trichomoniasis.  She is sexually active and does not use protection, but states she is monogamous and not having any vaginal bleeding or discharge.            Review of Systems   All other systems reviewed and are negative.      Past Medical History:   Diagnosis Date   • Gonorrhea     + on 4/16/2018... - on 5/17/2018   • Herpes     HSV1- valtrex prescribed   • Urinary tract infection    • Urogenital trichomoniasis     + on 3/29/2018 - on 5/17/2018       No Known Allergies    Past Surgical History:   Procedure Laterality Date   • DENTAL PROCEDURE     • WISDOM TOOTH EXTRACTION         No family history on file.    Social History     Socioeconomic History   • Marital status: Single     Spouse name: Not on file   • Number of children: Not on file   • Years of education: Not on file   • Highest education level: Not on file   Tobacco Use   • Smoking status: Never Smoker   • Smokeless tobacco: Never Used   Substance and Sexual Activity   • Alcohol use: No   • Drug use: No   • Sexual activity: Yes     Partners: Male     Comment: 2 weeks           Objective   Physical Exam  Vitals and nursing note reviewed.   Constitutional:       General: She is not in acute distress.     Appearance: Normal appearance. She is normal weight.   HENT:      Head: Normocephalic and atraumatic.       Nose: Nose normal.      Mouth/Throat:      Mouth: Mucous membranes are moist.      Pharynx: Oropharynx is clear. No oropharyngeal exudate or posterior oropharyngeal erythema.   Eyes:      Extraocular Movements: Extraocular movements intact.      Conjunctiva/sclera: Conjunctivae normal.      Pupils: Pupils are equal, round, and reactive to light.   Cardiovascular:      Rate and Rhythm: Normal rate and regular rhythm.      Pulses: Normal pulses.      Heart sounds: Normal heart sounds.   Pulmonary:      Effort: Pulmonary effort is normal.      Breath sounds: Normal breath sounds. No wheezing, rhonchi or rales.   Abdominal:      General: Abdomen is flat. Bowel sounds are normal. There is no distension.      Palpations: Abdomen is soft.      Tenderness: There is abdominal tenderness in the suprapubic area.   Musculoskeletal:         General: No tenderness. Normal range of motion.      Cervical back: Normal range of motion and neck supple. No rigidity. No muscular tenderness.      Right lower leg: No edema.      Left lower leg: No edema.   Skin:     General: Skin is warm and dry.      Capillary Refill: Capillary refill takes less than 2 seconds.      Findings: No rash.   Neurological:      General: No focal deficit present.      Mental Status: She is alert and oriented to person, place, and time. Mental status is at baseline.      Cranial Nerves: No cranial nerve deficit.      Sensory: No sensory deficit.      Motor: No weakness.   Psychiatric:         Mood and Affect: Mood normal.         Behavior: Behavior normal.         Thought Content: Thought content normal.         Procedures       Lab Results (last 24 hours)     Procedure Component Value Units Date/Time    CBC & Differential [[id number]]  (Abnormal) Collected: 09/17/21 1449    Specimen: Blood Updated: 09/17/21 0619    Narrative:      The following orders were created for panel order CBC & Differential.  Procedure                               Abnormality          Status                     ---------                               -----------         ------                     CBC Auto Differential[[id number]]        Abnormal            Final result                 Please view results for these tests on the individual orders.    Comprehensive Metabolic Panel [[id number]]  (Abnormal) Collected: 09/17/21 1449    Specimen: Blood Updated: 09/17/21 1529     Glucose 91 mg/dL      BUN 12 mg/dL      Creatinine 0.44 mg/dL      Sodium 137 mmol/L      Potassium 4.4 mmol/L      Chloride 103 mmol/L      CO2 26.0 mmol/L      Calcium 9.4 mg/dL      Total Protein 7.8 g/dL      Albumin 4.50 g/dL      ALT (SGPT) 9 U/L      AST (SGOT) 13 U/L      Alkaline Phosphatase 89 U/L      Total Bilirubin 0.3 mg/dL      eGFR Non African Amer >150 mL/min/1.73      Globulin 3.3 gm/dL      A/G Ratio 1.4 g/dL      BUN/Creatinine Ratio 27.3     Anion Gap 8.0 mmol/L     Narrative:      GFR Normal >60  Chronic Kidney Disease <60  Kidney Failure <15      Lipase [[id number]]  (Normal) Collected: 09/17/21 1449    Specimen: Blood Updated: 09/17/21 1524     Lipase 15 U/L     CBC Auto Differential [[id number]]  (Abnormal) Collected: 09/17/21 1449    Specimen: Blood Updated: 09/17/21 1459     WBC 7.10 10*3/mm3      RBC 4.45 10*6/mm3      Hemoglobin 13.2 g/dL      Hematocrit 40.0 %      MCV 89.9 fL      MCH 29.7 pg      MCHC 33.0 g/dL      RDW 12.9 %      RDW-SD 42.5 fl      MPV 10.2 fL      Platelets 244 10*3/mm3      Neutrophil % 71.5 %      Lymphocyte % 13.8 %      Monocyte % 11.3 %      Eosinophil % 2.3 %      Basophil % 0.8 %      Immature Grans % 0.3 %      Neutrophils, Absolute 5.08 10*3/mm3      Lymphocytes, Absolute 0.98 10*3/mm3      Monocytes, Absolute 0.80 10*3/mm3      Eosinophils, Absolute 0.16 10*3/mm3      Basophils, Absolute 0.06 10*3/mm3      Immature Grans, Absolute 0.02 10*3/mm3      nRBC 0.0 /100 WBC     hCG, Serum, Qualitative [[id number]]  (Normal) Collected: 09/17/21 1449    Specimen: Blood Updated:  09/17/21 1658     HCG Qualitative Negative    Urinalysis With Microscopic If Indicated (No Culture) - Urine, Clean Catch [[id number]]  (Abnormal) Collected: 09/17/21 1453    Specimen: Urine, Clean Catch Updated: 09/17/21 1509     Color, UA Yellow     Appearance, UA Clear     pH, UA <=5.0     Specific Gravity, UA 1.021     Glucose, UA Negative     Ketones, UA Negative     Bilirubin, UA Negative     Blood, UA Trace     Protein, UA Negative     Leuk Esterase, UA Small (1+)     Nitrite, UA Positive     Urobilinogen, UA 0.2 E.U./dL    Urinalysis, Microscopic Only - Urine, Clean Catch [[id number]]  (Abnormal) Collected: 09/17/21 1453    Specimen: Urine, Clean Catch Updated: 09/17/21 1509     RBC, UA 0-2 /HPF      WBC, UA 13-20 /HPF      Bacteria, UA 4+ /HPF      Squamous Epithelial Cells, UA 0-2 /HPF      Hyaline Casts, UA 0-2 /LPF      Methodology Automated Microscopy    Chlamydia trachomatis, Neisseria gonorrhoeae, PCR - Urine, Urine, Clean Catch [[id number]] Collected: 09/17/21 1453    Specimen: Urine, Clean Catch Updated: 09/17/21 1654    POCT pregnancy, urine [[id number]]  (Normal) Collected: 09/17/21 1454    Specimen: Urine Updated: 09/17/21 1456     HCG, Urine, QL Negative     Lot Number \[id number]\     Internal Positive Control Positive     Internal Negative Control Negative      No Radiology Exams Resulted Within Past 24 Hours    ED Course  ED Course as of Sep 17 1822   Fri Sep 17, 2021   1821 Patient with UTI.  Given her history of STDs in the past, will going to send urine for GC and chlamydia.  Not having any vaginal discharge or bleeding, is an abnormal relationship so is low risk for STD, will not empirically treat.  Does have UTI so the antibiotics.  No evidence for pyelonephritis.  Pain improved with Toradol, will give trial of p.o. Toradol at home.    [AW]      ED Course User Index  [AW] Kyrie Sotomayor MD                                           MDM  Number of Diagnoses or Management  Options  Cystitis: new and requires workup  Nonintractable headache, unspecified chronicity pattern, unspecified headache type: new and requires workup     Amount and/or Complexity of Data Reviewed  Clinical lab tests: reviewed    Risk of Complications, Morbidity, and/or Mortality  Presenting problems: moderate  Diagnostic procedures: moderate  Management options: moderate    Patient Progress  Patient progress: improved      Final diagnoses:   Cystitis   Nonintractable headache, unspecified chronicity pattern, unspecified headache type       ED Disposition  ED Disposition     ED Disposition Condition Comment    Discharge Stable             Follow-up with your primary doctor as an outpatient  In 2 days           Medication List      No changes were made to your prescriptions during this visit.          Kyrie Sotomayor MD  09/17/21 [id number]

## 2021-09-17 NOTE — DISCHARGE INSTRUCTIONS
Follow up with one of the [facility] physician groups below to setup primary care. If you have trouble making an appointment, please call the [facility] Nurse Line at [phone]    Dr. Yelena Bryant DO, Dr. Keshia Acuña DO, and KIANA Liu  [facility]  [address] [place] [address], [address] [phone]    Dr. Armin Abad MD  [facility] - [address] [address], [address], [place], [place] [address] [phone]    Dr. Irvin Fernandes DO, Dr. Kali Ordaz DO,  KIANA Hendrickson, and KIANA Collado  [facility] - [address] [address], [address], [place], [place] [address] [phone]     Dr. Zari Ornelas MD, and KIANA Horton  [facility] [address] [address], [place], [place] [id number] [phone]    Dr. Rah Correia MD and Dr. Aleksander Lucio MD  [address], [address] [phone]    Dr. Ramon Brody MD  [facility]  [address] [place] [address], [address], [place], [place], [address] [phone]    Dr. Robert Olvera MD  [facility]  [address], [place], [address] [phone]

## 2021-09-19 PROCEDURE — U0004 COV-19 TEST NON-CDC HGH THRU: HCPCS | Performed by: NURSE PRACTITIONER

## 2021-09-20 ENCOUNTER — TELEPHONE (OUTPATIENT)
Dept: EMERGENCY DEPT | Facility: HOSPITAL | Age: 26
End: 2021-09-20

## 2021-09-20 LAB
C TRACH RRNA CVX QL NAA+PROBE: DETECTED
N GONORRHOEA RRNA SPEC QL NAA+PROBE: DETECTED

## 2021-11-09 ENCOUNTER — OFFICE VISIT (OUTPATIENT)
Dept: PRIMARY CARE CLINIC | Age: 26
End: 2021-11-09
Payer: COMMERCIAL

## 2021-11-09 VITALS
DIASTOLIC BLOOD PRESSURE: 70 MMHG | TEMPERATURE: 96.9 F | WEIGHT: 134 LBS | HEART RATE: 78 BPM | SYSTOLIC BLOOD PRESSURE: 108 MMHG | RESPIRATION RATE: 16 BRPM | HEIGHT: 62 IN | BODY MASS INDEX: 24.66 KG/M2 | OXYGEN SATURATION: 99 %

## 2021-11-09 DIAGNOSIS — G47.00 INSOMNIA, UNSPECIFIED TYPE: ICD-10-CM

## 2021-11-09 DIAGNOSIS — K59.00 CONSTIPATION, UNSPECIFIED CONSTIPATION TYPE: ICD-10-CM

## 2021-11-09 DIAGNOSIS — F32.1 CURRENT MODERATE EPISODE OF MAJOR DEPRESSIVE DISORDER, UNSPECIFIED WHETHER RECURRENT (HCC): Primary | ICD-10-CM

## 2021-11-09 PROCEDURE — 99214 OFFICE O/P EST MOD 30 MIN: CPT | Performed by: FAMILY MEDICINE

## 2021-11-09 RX ORDER — POLYETHYLENE GLYCOL 3350 17 G/17G
17 POWDER, FOR SOLUTION ORAL DAILY PRN
Qty: 510 G | Refills: 5 | Status: SHIPPED | OUTPATIENT
Start: 2021-11-09 | End: 2021-12-09

## 2021-11-09 RX ORDER — TRAZODONE HYDROCHLORIDE 50 MG/1
50 TABLET ORAL NIGHTLY
Qty: 30 TABLET | Refills: 5 | Status: SHIPPED | OUTPATIENT
Start: 2021-11-09 | End: 2021-12-07 | Stop reason: SDUPTHER

## 2021-11-09 RX ORDER — FLUOXETINE 10 MG/1
10 CAPSULE ORAL DAILY
Qty: 30 CAPSULE | Refills: 3 | Status: SHIPPED | OUTPATIENT
Start: 2021-11-09 | End: 2021-12-07 | Stop reason: SDUPTHER

## 2021-11-09 ASSESSMENT — ENCOUNTER SYMPTOMS
BACK PAIN: 0
NAUSEA: 0
WHEEZING: 0
ABDOMINAL PAIN: 0
CONSTIPATION: 1
COUGH: 0
EYE DISCHARGE: 0
COLOR CHANGE: 0
DIARRHEA: 0
VOMITING: 0

## 2021-11-09 NOTE — PATIENT INSTRUCTIONS
Patient Education        Constipation: Care Instructions  Your Care Instructions     Constipation means that you have a hard time passing stools (bowel movements). People pass stools from 3 times a day to once every 3 days. What is normal for you may be different. Constipation may occur with pain in the rectum and cramping. The pain may get worse when you try to pass stools. Sometimes there are small amounts of bright red blood on toilet paper or the surface of stools. This is because of enlarged veins near the rectum (hemorrhoids). A few changes in your diet and lifestyle may help you avoid ongoing constipation. Your doctor may also prescribe medicine to help loosen your stool. Some medicines can cause constipation. These include pain medicines and antidepressants. Tell your doctor about all the medicines you take. Your doctor may want to make a medicine change to ease your symptoms. Follow-up care is a key part of your treatment and safety. Be sure to make and go to all appointments, and call your doctor if you are having problems. It's also a good idea to know your test results and keep a list of the medicines you take. How can you care for yourself at home? · Drink plenty of fluids. If you have kidney, heart, or liver disease and have to limit fluids, talk with your doctor before you increase the amount of fluids you drink. · Include high-fiber foods in your diet each day. These include fruits, vegetables, beans, and whole grains. · Get at least 30 minutes of exercise on most days of the week. Walking is a good choice. You also may want to do other activities, such as running, swimming, cycling, or playing tennis or team sports. · Take a fiber supplement, such as Citrucel or Metamucil, every day. Read and follow all instructions on the label. · Schedule time each day for a bowel movement. A daily routine may help. Take your time having your bowel movement.   · Support your feet with a small step stool when you sit on the toilet. This helps flex your hips and places your pelvis in a squatting position. · Your doctor may recommend an over-the-counter laxative to relieve your constipation. Examples are Milk of Magnesia and MiraLax. Read and follow all instructions on the label. Do not use laxatives on a long-term basis. When should you call for help? Call your doctor now or seek immediate medical care if:    · You have new or worse belly pain.     · You have new or worse nausea or vomiting.     · You have blood in your stools. Watch closely for changes in your health, and be sure to contact your doctor if:    · Your constipation is getting worse.     · You do not get better as expected. Where can you learn more? Go to [email or web address] org and sign in to your [employer] account. Enter 21 [phone] in the [employer] box to learn more about \"Constipation: Care Instructions. \"     If you do not have an account, please click on the \"Sign Up Now\" link. Current as of: July 1, 2021               Content Version: 13.0  © 3924-3334 Healthwise, Incorporated. Care instructions adapted under license by [facility] ([facility]). If you have questions about a medical condition or this instruction, always ask your healthcare professional. [address] any warranty or liability for your use of this information.

## 2021-11-09 NOTE — PROGRESS NOTES
activity change, appetite change and fever. HENT: Negative for congestion and nosebleeds. Eyes: Negative for discharge. Respiratory: Negative for cough and wheezing. Cardiovascular: Negative for chest pain and leg swelling. Gastrointestinal: Positive for constipation. Negative for abdominal pain, diarrhea, nausea and vomiting. Genitourinary: Negative for difficulty urinating, frequency and urgency. Musculoskeletal: Negative for back pain and gait problem. Skin: Negative for color change and rash. Neurological: Negative for dizziness and headaches. Hematological: Does not bruise/bleed easily. Psychiatric/Behavioral: Positive for dysphoric mood and sleep disturbance. Negative for suicidal ideas. OBJECTIVE:    Physical Exam  Vitals reviewed. Constitutional:       General: She is not in acute distress. Appearance: Normal appearance. She is well-developed. She is not diaphoretic. HENT:      Head: Normocephalic and atraumatic. Right Ear: External ear normal.      Left Ear: External ear normal.   Cardiovascular:      Rate and Rhythm: Normal rate and regular rhythm. Pulses: Normal pulses. Heart sounds: Normal heart sounds. No murmur heard. Pulmonary:      Effort: Pulmonary effort is normal. No respiratory distress. Breath sounds: Normal breath sounds. Musculoskeletal:      Cervical back: Normal range of motion and neck supple. Skin:     General: Skin is warm and dry. Neurological:      General: No focal deficit present. Mental Status: She is alert and oriented to person, place, and time. Mental status is at baseline. Psychiatric:         Mood and Affect: Mood normal.         Behavior: Behavior normal.         Thought Content:  Thought content normal.         Judgment: Judgment normal.        /70 (Site: Left Upper Arm, Position: Sitting, Cuff Size: Medium Adult)   Pulse 78   Temp 96.9 °F (36.1 °C) (Temporal)   Resp 16   Ht 5' 2\" (1.575 m) Wt 134 lb (60.8 kg)   SpO2 99%   BMI 24.51 kg/m²      ASSESSMENT/PLAN:    Candis was seen today for check-up, depression and insomnia. Diagnoses and all orders for this visit:    Current moderate episode of major depressive disorder, unspecified whether recurrent ([facility]  -     CAESAR/ Neftaly Connelly, Flaquita Echols, Behavioral Medicine, [place]    Constipation, unspecified constipation type    Insomnia, unspecified type    Other orders  -     FLUoxetine (PROZAC) 10 MG capsule; Take 1 capsule by mouth daily  -     traZODone (DESYREL) 50 MG tablet; Take 1 tablet by mouth nightly  -     polyethylene glycol (GLYCOLAX) 17 GM/SCOOP powder; Take 17 g by mouth daily as needed (constipation)          I am going to put a referral in for behavioral health for counseling for the depression. I am going to restart her Prozac to see if this will help with her symptoms. Trazodone was sent to the pharmacy to help with insomnia. MiraLAX was sent to the pharmacy to help with constipation. Follow-up with us in 4 weeks for recheck to see how she is doing unless she needs a sooner. I did give her information about constipation including increasing the fiber in her diet and also increasing her water intake. EMR Dragon/transcription disclaimer:  Much of this encounter note is electronic transcription/translation of spoken language toprinted texts. The electronic translation of spoken language may be erroneous, or at times, nonsensical words or phrases may be inadvertently transcribed.   Although I have reviewed the note for such errors, some may stillexist.

## 2021-12-07 ENCOUNTER — OFFICE VISIT (OUTPATIENT)
Dept: PRIMARY CARE CLINIC | Age: 26
End: 2021-12-07
Payer: COMMERCIAL

## 2021-12-07 VITALS
HEART RATE: 99 BPM | OXYGEN SATURATION: 100 % | TEMPERATURE: 97.5 F | WEIGHT: 133 LBS | DIASTOLIC BLOOD PRESSURE: 70 MMHG | BODY MASS INDEX: 24.48 KG/M2 | SYSTOLIC BLOOD PRESSURE: 110 MMHG | HEIGHT: 62 IN

## 2021-12-07 DIAGNOSIS — N92.6 IRREGULAR MENSES: ICD-10-CM

## 2021-12-07 DIAGNOSIS — F32.1 CURRENT MODERATE EPISODE OF MAJOR DEPRESSIVE DISORDER, UNSPECIFIED WHETHER RECURRENT (HCC): ICD-10-CM

## 2021-12-07 DIAGNOSIS — R10.30 LOWER ABDOMINAL PAIN: Primary | ICD-10-CM

## 2021-12-07 DIAGNOSIS — G47.00 INSOMNIA, UNSPECIFIED TYPE: ICD-10-CM

## 2021-12-07 LAB
APPEARANCE FLUID: CLEAR
BILIRUBIN, POC: NORMAL
BLOOD URINE, POC: NORMAL
CLARITY, POC: CLEAR
COLOR, POC: YELLOW
CONTROL: NORMAL
GLUCOSE URINE, POC: NORMAL
KETONES, POC: NORMAL
LEUKOCYTE EST, POC: NORMAL
NITRITE, POC: NORMAL
PH, POC: NORMAL
PREGNANCY TEST URINE, POC: NEGATIVE
PROTEIN, POC: NORMAL
SPECIFIC GRAVITY, POC: NORMAL
UROBILINOGEN, POC: NORMAL

## 2021-12-07 PROCEDURE — 81025 URINE PREGNANCY TEST: CPT | Performed by: FAMILY MEDICINE

## 2021-12-07 PROCEDURE — 81002 URINALYSIS NONAUTO W/O SCOPE: CPT | Performed by: FAMILY MEDICINE

## 2021-12-07 PROCEDURE — 99214 OFFICE O/P EST MOD 30 MIN: CPT | Performed by: FAMILY MEDICINE

## 2021-12-07 RX ORDER — FLUOXETINE HYDROCHLORIDE 20 MG/1
20 CAPSULE ORAL DAILY
Qty: 30 CAPSULE | Refills: 5 | Status: SHIPPED | OUTPATIENT
Start: 2021-12-07

## 2021-12-07 RX ORDER — TRAZODONE HYDROCHLORIDE 150 MG/1
150 TABLET ORAL NIGHTLY
Qty: 30 TABLET | Refills: 5 | Status: SHIPPED | OUTPATIENT
Start: 2021-12-07

## 2021-12-17 ASSESSMENT — ENCOUNTER SYMPTOMS
VOMITING: 0
COLOR CHANGE: 0
COUGH: 0
EYE DISCHARGE: 0
NAUSEA: 0
BACK PAIN: 0
WHEEZING: 0
ABDOMINAL PAIN: 1
DIARRHEA: 0

## 2021-12-17 NOTE — PROGRESS NOTES
SUBJECTIVE:    Patient ID: Hali Conn is a 23 y.o. female. HPI:   Patient is seen today for 4-week follow-up on her depression. She states that she does not feel like the Prozac 10 mg is doing much for her at this point. She states that she is also still having difficulty sleeping. She has been taking 2 of the trazodone she states that she really just cannot tell much difference with it. She is tolerating it well and denies any side effects. She denies any suicidal thoughts or ideation today. She states that she is also tolerated the Prozac well but just does not like it is doing much for her at this point. She states that she is also had some lower abdominal cramping and discomfort. She states that she has not had any dark urine. She denies any bowel habit changes. She denies any fevers or chills. She states it mostly is located right above her pubic area. She states that she is not having any discharge that she is aware of. She states that her menstrual cycle has been off a little bit the last couple of months. Past Medical History:   Diagnosis Date    ADHD (attention deficit hyperactivity disorder)     Back pain 2019    Depression     Miscarriage     10/2020    Shoulder pain 2019    Varicella       No current outpatient medications on file prior to visit. No current facility-administered medications on file prior to visit. No Known Allergies    Review of Systems   Constitutional: Negative for activity change, appetite change and fever. HENT: Negative for congestion and nosebleeds. Eyes: Negative for discharge. Respiratory: Negative for cough and wheezing. Cardiovascular: Negative for chest pain and leg swelling. Gastrointestinal: Positive for abdominal pain. Negative for diarrhea, nausea and vomiting. Genitourinary: Negative for difficulty urinating, frequency and urgency. Musculoskeletal: Negative for back pain and gait problem.    Skin: Negative for color change and rash. Neurological: Negative for dizziness and headaches. Hematological: Does not bruise/bleed easily. Psychiatric/Behavioral: Positive for dysphoric mood and sleep disturbance. Negative for suicidal ideas. OBJECTIVE:    Physical Exam  Vitals reviewed. Constitutional:       General: She is not in acute distress. Appearance: Normal appearance. She is well-developed. She is not diaphoretic. HENT:      Head: Normocephalic and atraumatic. Right Ear: External ear normal.      Left Ear: External ear normal.   Cardiovascular:      Rate and Rhythm: Normal rate and regular rhythm. Pulses: Normal pulses. Heart sounds: Normal heart sounds. No murmur heard. Pulmonary:      Effort: Pulmonary effort is normal. No respiratory distress. Breath sounds: Normal breath sounds. Abdominal:      General: Abdomen is flat. Bowel sounds are normal.      Palpations: Abdomen is soft. Tenderness: There is abdominal tenderness (pelvic area). Musculoskeletal:      Cervical back: Normal range of motion and neck supple. Skin:     General: Skin is warm and dry. Neurological:      General: No focal deficit present. Mental Status: She is alert and oriented to person, place, and time. Mental status is at baseline. Psychiatric:         Mood and Affect: Mood normal.         Behavior: Behavior normal.         Thought Content: Thought content normal.         Judgment: Judgment normal.        /70   Pulse 99   Temp 97.5 °F (36.4 °C)   Ht 5' 2\" (1.575 m)   Wt 133 lb (60.3 kg)   SpO2 100%   BMI 24.33 kg/m²      ASSESSMENT/PLAN:    1. Lower abdominal pain  -     POCT Urinalysis no Micro  -     POCT urine pregnancy  2. Irregular menses  -     POCT Urinalysis no Micro  -     POCT urine pregnancy  3. Current moderate episode of major depressive disorder, unspecified whether recurrent ([address]  4.  Insomnia, unspecified type       We are going to try increasing her Prozac to 20 mg daily. We are going to increase her trazodone to 150 mg daily. Urinalysis was obtained today as well as a urine pregnancy test.  Urine pregnancy was negative. Urinalysis was also normal.  Discussed that if her symptoms are getting worse she is to let us know. Discussed if they are not improving we may need to get an ultrasound. Follow-up with us in 4 weeks for recheck on depression and anxiety and if her pelvic pain is not improving at that point discussed may need to look at getting an ultrasound. PDMP Monitoring:    Last PDMP [place] as Reviewed [facility]):  Review User Review Instant Review Result            Urine Drug Screenings (1 yr)    No resulted procedures found. Medication Contract and Consent for Opioid Use Documents Filed      No documents found                 EMR Dragon/transcription disclaimer:  Much of this encounter note is electronic transcription/translation of spoken language toprinted texts. The electronic translation of spoken language may be erroneous, or at times, nonsensical words or phrases may be inadvertently transcribed.   Although I have reviewed the note for such errors, some may stillexist.

## 2022-01-06 ENCOUNTER — NURSE TRIAGE (OUTPATIENT)
Dept: OTHER | Facility: CLINIC | Age: 27
End: 2022-01-06

## 2022-01-06 NOTE — TELEPHONE ENCOUNTER
Received call from Maryellen Olguin at [facility] [place] caller not on line. Complaint: Sharp chest pains ever since panic attack about a week ago, also stomach pain and diarrhea    Market: [address] [facility] [address] Name: Chela [facility] telephone number verified as [phone] This is mother's number, she answered when writer called patient back with verified phone number. Mother gave [phone] to get in touch with patient. Connected with caller via phone, please see below triage     Subjective: Caller states \"Well about a week ago I would say, I don't know if I had a panic attack, but ever since then my chest has been hurting. It's not a constant. It's been happening throughout the day. It has been happening for about a week, I've been having stomach pain for a week about too. Yesterday I started having diarrhea and a sore throat. \"     Current Symptoms: No chest pain now, but did this morning, sharp pain on right side of throat with swallowing, diarrhea, abdominal pain-constant, headaches-hx of headaches, nausea-no vomiting    Chest pain happens when more active- gets very sharp pains    Onset: 1 week ago; worsening    Associated Symptoms: diarrhea    Pain Severity: 6/10; cramping/sharp at times; constant    Temperature: Denies     What has been tried: Ibuprofen- doesn't help    LMP: 12/29/21-1/1/22 Pregnant: Unknown    Recommended disposition: Go to [address] [place], [place] [address]r Av or Office with PCP Approval. Patient agreeable. Care advice provided, patient verbalizes understanding; denies any other questions or concerns; instructed to call back for any new or worsening symptoms. Writer provided warm transfer to [facility] at [address] for 2nd Level Triage. Attention Provider: Thank you for allowing me to participate in the care of your patient. The patient was connected to triage in response to information provided to the ECC/PSC.   Please do not respond through this encounter as the response is not directed to a shared pool.       Reason for Disposition   Constant abdominal pain lasting > 2 hours    Protocols used: ABDOMINAL PAIN - FEMALE-ADULT-[place]

## 2022-02-23 ENCOUNTER — OFFICE VISIT (OUTPATIENT)
Age: 27
End: 2022-02-23
Payer: COMMERCIAL

## 2022-02-23 VITALS
DIASTOLIC BLOOD PRESSURE: 64 MMHG | TEMPERATURE: 98.7 F | SYSTOLIC BLOOD PRESSURE: 107 MMHG | HEART RATE: 88 BPM | HEIGHT: 62 IN | RESPIRATION RATE: 22 BRPM | BODY MASS INDEX: 23.81 KG/M2 | WEIGHT: 129.4 LBS | OXYGEN SATURATION: 98 %

## 2022-02-23 DIAGNOSIS — R10.30 LOWER ABDOMINAL PAIN: Primary | ICD-10-CM

## 2022-02-23 LAB
APPEARANCE FLUID: NORMAL
BILIRUBIN, POC: NEGATIVE
BLOOD URINE, POC: NEGATIVE
CLARITY, POC: NORMAL
COLOR, POC: YELLOW
GLUCOSE URINE, POC: NEGATIVE
KETONES, POC: NEGATIVE
LEUKOCYTE EST, POC: NEGATIVE
NITRITE, POC: NEGATIVE
PH, POC: 5.5
PROTEIN, POC: NEGATIVE
SPECIFIC GRAVITY, POC: >=1.03
UROBILINOGEN, POC: 0.2

## 2022-02-23 PROCEDURE — 99213 OFFICE O/P EST LOW 20 MIN: CPT

## 2022-02-23 PROCEDURE — 81003 URINALYSIS AUTO W/O SCOPE: CPT

## 2022-02-23 ASSESSMENT — ENCOUNTER SYMPTOMS
NAUSEA: 0
CONSTIPATION: 1
ABDOMINAL PAIN: 1

## 2022-02-23 NOTE — PROGRESS NOTES
[facility]  [address] [address] [place] [address]  Dept: [phone]  Dept Fax: Jarad [phone]    Elias Mtz is a 23 y.o. female who presents today for her medical conditions/complaints as noted below. Elias Mtz is c/o of Abdominal Pain        HPI:     ORALIA Chirinos presents today for complaints of sharp, stabbing intermittent lower abdominal pain and pressure on her bladder. She reports that she has been constipated, she denies dysuria or frequency. She reports she has been using miralax for the last few days and had a small hard BM yesterday. She denies fevers. She reports the longer this has gone on, she has now developed pain around to her lower back. Past Medical History:   Diagnosis Date    ADHD (attention deficit hyperactivity disorder)     Back pain 2019    Depression     Miscarriage     10/2020    Shoulder pain 2019    Varicella      Past Surgical History:   Procedure Laterality Date    DENTAL SURGERY      patient reports oral surgery 3-4 years ago       Family History   Problem Relation Age of Onset    High Blood Pressure Maternal Grandmother     Diabetes Maternal Grandmother     COPD Maternal Grandmother     High Blood Pressure Maternal Grandfather     Diabetes Maternal Grandfather        Social History     Tobacco Use    Smoking status: Never Smoker    Smokeless tobacco: Never Used   Substance Use Topics    Alcohol use: No      Current Outpatient Medications   Medication Sig Dispense Refill    FLUoxetine (PROZAC) 20 MG capsule Take 1 capsule by mouth daily (Patient not taking: Reported on 2022) 30 capsule 5    traZODone (DESYREL) 150 MG tablet Take 1 tablet by mouth nightly (Patient not taking: Reported on 2022) 30 tablet 5     No current facility-administered medications for this visit.      No Known Allergies    Health Maintenance   Topic Date Due    Hepatitis C screen  Never done    COVID-19 Vaccine (1) Never done    HIV screen  Never done    Pap smear  Never done    Varicella vaccine (2 of 2 - 13+ 2-dose series) 12/28/2018    Flu vaccine (1) 09/01/2021    Depression Monitoring  04/08/2022    DTaP/Tdap/Td vaccine (7 - Td or Tdap) 11/29/2028    Hepatitis B vaccine  Completed    Hib vaccine  Completed    HPV vaccine  Completed    Meningococcal (ACWY) vaccine  Completed    Hepatitis A vaccine  Aged Out    Pneumococcal 0-64 years Vaccine  Aged Out       Subjective:     Review of Systems   Constitutional: Positive for appetite change. Negative for fever. Gastrointestinal: Positive for abdominal pain and constipation. Negative for nausea. Genitourinary: Negative for dysuria and frequency.       :Objective      Physical Exam  Constitutional:       Appearance: Normal appearance. HENT:      Head: Normocephalic and atraumatic. Right Ear: Tympanic membrane, ear canal and external ear normal.      Left Ear: Tympanic membrane, ear canal and external ear normal.      Nose: Nose normal.      Mouth/Throat:      Mouth: Mucous membranes are moist.   Eyes:      General:         Right eye: No discharge. Left eye: No discharge. Conjunctiva/sclera: Conjunctivae normal.   Cardiovascular:      Rate and Rhythm: Normal rate and regular rhythm. Pulmonary:      Effort: Pulmonary effort is normal. No respiratory distress. Abdominal:      General: Abdomen is flat. Palpations: Abdomen is soft. Tenderness: There is abdominal tenderness in the right lower quadrant, suprapubic area and left lower quadrant. There is no right CVA tenderness or left CVA tenderness. Musculoskeletal:         General: Normal range of motion. Cervical back: Normal range of motion. Lymphadenopathy:      Cervical: No cervical adenopathy. Skin:     General: Skin is warm and dry. Capillary Refill: Capillary refill takes less than 2 seconds. Findings: No rash.    Neurological: General: No focal deficit present. Mental Status: She is alert. Psychiatric:         Mood and Affect: Mood normal.       /64   Pulse 88   Temp 98.7 °F (37.1 °C)   Resp 22   Ht 5' 2\" (1.575 m)   Wt 129 lb 6.4 oz (58.7 kg)   LMP 01/26/2022   SpO2 98%   BMI 23.67 kg/m²     :Assessment       Diagnosis Orders   1. Lower abdominal pain  POCT Urinalysis no Micro       :Plan    D/W patient differential diagnoses. Most likely is constipation. Will treat for constipation, education performed on return precautions-  Pt verbalized understanding. Orders Placed This Encounter   Procedures    POCT Urinalysis no Micro     Results for orders placed or performed in visit on 02/23/22   POCT Urinalysis no Micro   Result Value Ref Range    Color, UA Yellow     Clarity, UA Slightly Cloudy     Glucose, UA POC Negative     Bilirubin, UA Negative     Ketones, UA Negative     Spec Grav, UA >=1.030     Blood, UA POC Negative     pH, UA 5.5     Protein, UA POC Negative     Urobilinogen, UA 0.2     Leukocytes, UA Negative     Nitrite, UA Negative     Appearance, Fluid       No follow-ups on file. No orders of the defined types were placed in this encounter. Patient given educational materials- see patient instructions. Discussed use, benefit, and side effects of prescribed medications. All patient questions answered. Pt voiced understanding. Patient Instructions     1. Miralax 1 capful twice daily x 3 days, then daily x 1 week  2. 1 bottle magnesium citrate today, may repeat tomorrow morning if no BM  3. Get over the counter dulcolax pills and take as directed on the box  4. Increase fluids- especially water  5. Monitor for straining when having BM, bloody stool, severe abdominal pain and fever  6. Return if abdominal pain not resolved by [facility]        Patient Education        Constipation: Care Instructions  Overview     Constipation means that you have a hard time passing stools (bowel movements). People pass stools from 3 times a day to once every 3 days. What is normal for you may be different. Constipation may occur with pain in the rectum and cramping. The pain may get worse when you try to pass stools. Sometimes there are small amounts of bright red blood on toilet paper or the surface of stools. This is because of enlarged veins near the rectum (hemorrhoids). A few changes in your diet and lifestyle may help you avoid ongoing constipation. Your doctor may also prescribe medicine to help loosen your stool. Some medicines can cause constipation. These include pain medicines and antidepressants. Tell your doctor about all the medicines you take. Your doctor may want to make a medicine change to ease your symptoms. Follow-up care is a key part of your treatment and safety. Be sure to make and go to all appointments, and call your doctor if you are having problems. It's also a good idea to know your test results and keep a list of the medicines you take. How can you care for yourself at home? · Drink plenty of fluids. If you have kidney, heart, or liver disease and have to limit fluids, talk with your doctor before you increase the amount of fluids you drink. · Include high-fiber foods in your diet each day. These include fruits, vegetables, beans, and whole grains. · Get at least 30 minutes of exercise on most days of the week. Walking is a good choice. You also may want to do other activities, such as running, swimming, cycling, or playing tennis or team sports. · Take a fiber supplement, such as Citrucel or Metamucil, every day. Read and follow all instructions on the label. · Schedule time each day for a bowel movement. A daily routine may help. Take your time having a bowel movement, but don't sit for more than 10 minutes at a time. And don't strain too much. · Support your feet with a small step stool when you sit on the toilet.  This helps flex your hips and places your pelvis in a squatting position. · Your doctor may recommend an over-the-counter laxative to relieve your constipation. Examples are Milk of Magnesia and MiraLax. Read and follow all instructions on the label. Do not use laxatives on a long-term basis. When should you call for help? Call your doctor now or seek immediate medical care if:    · You have new or worse belly pain.     · You have new or worse nausea or vomiting.     · You have blood in your stools. Watch closely for changes in your health, and be sure to contact your doctor if:    · Your constipation is getting worse.     · You do not get better as expected. Where can you learn more? Go to https://Today Tix.DeliveryEdge. org and sign in to your EndoShape account. Enter 21 [phone] in the IMGuest box to learn more about \"Constipation: Care Instructions. \"     If you do not have an account, please click on the \"Sign Up Now\" link. Current as of: July 1, 2021               Content Version: 13.1  © 2006-2021 Healthwise, Incorporated. Care instructions adapted under license by [facility] ([facility]). If you have questions about a medical condition or this instruction, always ask your healthcare professional. [address] [place] [address] any warranty or liability for your use of this information.                Electronically signed by USAMA Castelan CNP on 2/23/2022 at 4:06 PM

## 2022-02-23 NOTE — PATIENT INSTRUCTIONS
to do other activities, such as running, swimming, cycling, or playing tennis or team sports. · Take a fiber supplement, such as Citrucel or Metamucil, every day. Read and follow all instructions on the label. · Schedule time each day for a bowel movement. A daily routine may help. Take your time having a bowel movement, but don't sit for more than 10 minutes at a time. And don't strain too much. · Support your feet with a small step stool when you sit on the toilet. This helps flex your hips and places your pelvis in a squatting position. · Your doctor may recommend an over-the-counter laxative to relieve your constipation. Examples are Milk of Magnesia and MiraLax. Read and follow all instructions on the label. Do not use laxatives on a long-term basis. When should you call for help? Call your doctor now or seek immediate medical care if:    · You have new or worse belly pain.     · You have new or worse nausea or vomiting.     · You have blood in your stools. Watch closely for changes in your health, and be sure to contact your doctor if:    · Your constipation is getting worse.     · You do not get better as expected. Where can you learn more? Go to https://[employer]. org and sign in to your Noosh account. Enter 21 [phone] in the [facility] box to learn more about \"Constipation: Care Instructions. \"     If you do not have an account, please click on the \"Sign Up Now\" link. Current as of: July 1, 2021               Content Version: 13.1  © 2006-2021 Healthwise, Incorporated. Care instructions adapted under license by [facility] ([facility]). If you have questions about a medical condition or this instruction, always ask your healthcare professional. [place] [address] any warranty or liability for your use of this information.

## 2022-10-30 ENCOUNTER — APPOINTMENT (OUTPATIENT)
Dept: GENERAL RADIOLOGY | Facility: HOSPITAL | Age: 27
End: 2022-10-30

## 2022-10-30 ENCOUNTER — HOSPITAL ENCOUNTER (EMERGENCY)
Facility: HOSPITAL | Age: 27
Discharge: HOME | End: 2022-10-30
Attending: EMERGENCY MEDICINE | Admitting: EMERGENCY MEDICINE

## 2022-10-30 VITALS
OXYGEN SATURATION: 96 % | SYSTOLIC BLOOD PRESSURE: 111 MMHG | HEART RATE: 93 BPM | HEIGHT: 62 IN | WEIGHT: 138 LBS | DIASTOLIC BLOOD PRESSURE: 64 MMHG | TEMPERATURE: 98.6 F | BODY MASS INDEX: 25.4 KG/M2 | RESPIRATION RATE: 16 BRPM

## 2022-10-30 DIAGNOSIS — R51.9 NONINTRACTABLE HEADACHE, UNSPECIFIED CHRONICITY PATTERN, UNSPECIFIED HEADACHE TYPE: ICD-10-CM

## 2022-10-30 DIAGNOSIS — J02.9 SORE THROAT: ICD-10-CM

## 2022-10-30 DIAGNOSIS — U07.1 COVID: Primary | ICD-10-CM

## 2022-10-30 LAB
ALBUMIN SERPL-MCNC: 4.8 G/DL (ref 3.5–5.2)
ALBUMIN/GLOB SERPL: 1.4 G/DL
ALP SERPL-CCNC: 74 U/L (ref 39–117)
ALT SERPL W P-5'-P-CCNC: 10 U/L (ref 1–33)
AMPHET+METHAMPHET UR QL: NEGATIVE
AMPHETAMINES UR QL: NEGATIVE
ANION GAP SERPL CALCULATED.3IONS-SCNC: 11 MMOL/L (ref 5–15)
AST SERPL-CCNC: 14 U/L (ref 1–32)
B-HCG UR QL: NEGATIVE
BARBITURATES UR QL SCN: NEGATIVE
BASOPHILS # BLD AUTO: 0.03 10*3/MM3 (ref 0–0.2)
BASOPHILS NFR BLD AUTO: 0.6 % (ref 0–1.5)
BENZODIAZ UR QL SCN: NEGATIVE
BILIRUB SERPL-MCNC: 0.2 MG/DL (ref 0–1.2)
BUN SERPL-MCNC: 9 MG/DL (ref 6–20)
BUN/CREAT SERPL: 17.3 (ref 7–25)
BUPRENORPHINE SERPL-MCNC: NEGATIVE NG/ML
CALCIUM SPEC-SCNC: 8.9 MG/DL (ref 8.6–10.5)
CANNABINOIDS SERPL QL: NEGATIVE
CHLORIDE SERPL-SCNC: 102 MMOL/L (ref 98–107)
CO2 SERPL-SCNC: 23 MMOL/L (ref 22–29)
COCAINE UR QL: NEGATIVE
CREAT SERPL-MCNC: 0.52 MG/DL (ref 0.57–1)
DEPRECATED RDW RBC AUTO: 43 FL (ref 37–54)
EGFRCR SERPLBLD CKD-EPI 2021: 131.6 ML/MIN/1.73
EOSINOPHIL # BLD AUTO: 0.02 10*3/MM3 (ref 0–0.4)
EOSINOPHIL NFR BLD AUTO: 0.4 % (ref 0.3–6.2)
ERYTHROCYTE [DISTWIDTH] IN BLOOD BY AUTOMATED COUNT: 13 % (ref 12.3–15.4)
EXPIRATION DATE: NORMAL
FLUAV RNA RESP QL NAA+PROBE: NOT DETECTED
FLUBV RNA RESP QL NAA+PROBE: NOT DETECTED
GLOBULIN UR ELPH-MCNC: 3.4 GM/DL
GLUCOSE SERPL-MCNC: 97 MG/DL (ref 65–99)
HCG SERPL QL: NEGATIVE
HCT VFR BLD AUTO: 39.3 % (ref 34–46.6)
HGB BLD-MCNC: 13.1 G/DL (ref 12–15.9)
IMM GRANULOCYTES # BLD AUTO: 0.02 10*3/MM3 (ref 0–0.05)
IMM GRANULOCYTES NFR BLD AUTO: 0.4 % (ref 0–0.5)
INTERNAL NEGATIVE CONTROL: NORMAL
INTERNAL POSITIVE CONTROL: NORMAL
LYMPHOCYTES # BLD AUTO: 0.44 10*3/MM3 (ref 0.7–3.1)
LYMPHOCYTES NFR BLD AUTO: 8.3 % (ref 19.6–45.3)
Lab: NORMAL
MCH RBC QN AUTO: 30.1 PG (ref 26.6–33)
MCHC RBC AUTO-ENTMCNC: 33.3 G/DL (ref 31.5–35.7)
MCV RBC AUTO: 90.3 FL (ref 79–97)
METHADONE UR QL SCN: NEGATIVE
MONOCYTES # BLD AUTO: 0.79 10*3/MM3 (ref 0.1–0.9)
MONOCYTES NFR BLD AUTO: 15 % (ref 5–12)
NEUTROPHILS NFR BLD AUTO: 3.98 10*3/MM3 (ref 1.7–7)
NEUTROPHILS NFR BLD AUTO: 75.3 % (ref 42.7–76)
NRBC BLD AUTO-RTO: 0 /100 WBC (ref 0–0.2)
OPIATES UR QL: NEGATIVE
OXYCODONE UR QL SCN: NEGATIVE
PCP UR QL SCN: NEGATIVE
PLATELET # BLD AUTO: 249 10*3/MM3 (ref 140–450)
PMV BLD AUTO: 10.4 FL (ref 6–12)
POTASSIUM SERPL-SCNC: 3.6 MMOL/L (ref 3.5–5.2)
PROPOXYPH UR QL: NEGATIVE
PROT SERPL-MCNC: 8.2 G/DL (ref 6–8.5)
RBC # BLD AUTO: 4.35 10*6/MM3 (ref 3.77–5.28)
S PYO AG THROAT QL: NEGATIVE
SARS-COV-2 RNA RESP QL NAA+PROBE: DETECTED
SODIUM SERPL-SCNC: 136 MMOL/L (ref 136–145)
TRICYCLICS UR QL SCN: NEGATIVE
WBC NRBC COR # BLD: 5.28 10*3/MM3 (ref 3.4–10.8)

## 2022-10-30 PROCEDURE — 25010000002 METOCLOPRAMIDE PER 10 MG: Performed by: EMERGENCY MEDICINE

## 2022-10-30 PROCEDURE — 99284 EMERGENCY DEPT VISIT MOD MDM: CPT

## 2022-10-30 PROCEDURE — 84703 CHORIONIC GONADOTROPIN ASSAY: CPT | Performed by: NURSE PRACTITIONER

## 2022-10-30 PROCEDURE — 85025 COMPLETE CBC W/AUTO DIFF WBC: CPT | Performed by: NURSE PRACTITIONER

## 2022-10-30 PROCEDURE — 71045 X-RAY EXAM CHEST 1 VIEW: CPT

## 2022-10-30 PROCEDURE — 87880 STREP A ASSAY W/OPTIC: CPT | Performed by: NURSE PRACTITIONER

## 2022-10-30 PROCEDURE — 96375 TX/PRO/DX INJ NEW DRUG ADDON: CPT

## 2022-10-30 PROCEDURE — 80053 COMPREHEN METABOLIC PANEL: CPT | Performed by: NURSE PRACTITIONER

## 2022-10-30 PROCEDURE — 25010000002 DIPHENHYDRAMINE PER 50 MG: Performed by: EMERGENCY MEDICINE

## 2022-10-30 PROCEDURE — 96374 THER/PROPH/DIAG INJ IV PUSH: CPT

## 2022-10-30 PROCEDURE — 80306 DRUG TEST PRSMV INSTRMNT: CPT | Performed by: EMERGENCY MEDICINE

## 2022-10-30 PROCEDURE — 36415 COLL VENOUS BLD VENIPUNCTURE: CPT

## 2022-10-30 PROCEDURE — 87636 SARSCOV2 & INF A&B AMP PRB: CPT | Performed by: NURSE PRACTITIONER

## 2022-10-30 PROCEDURE — 25010000002 DEXAMETHASONE PER 1 MG: Performed by: EMERGENCY MEDICINE

## 2022-10-30 PROCEDURE — 87081 CULTURE SCREEN ONLY: CPT | Performed by: NURSE PRACTITIONER

## 2022-10-30 PROCEDURE — 81025 URINE PREGNANCY TEST: CPT | Performed by: EMERGENCY MEDICINE

## 2022-10-30 RX ORDER — METHYLPREDNISOLONE 4 MG/1
TABLET ORAL
Qty: 21 TABLET | Refills: 0 | Status: SHIPPED | OUTPATIENT
Start: 2022-10-30

## 2022-10-30 RX ORDER — METOCLOPRAMIDE HYDROCHLORIDE 5 MG/ML
10 INJECTION INTRAMUSCULAR; INTRAVENOUS ONCE
Status: COMPLETED | OUTPATIENT
Start: 2022-10-30 | End: 2022-10-30

## 2022-10-30 RX ORDER — DIPHENHYDRAMINE HYDROCHLORIDE 50 MG/ML
25 INJECTION INTRAMUSCULAR; INTRAVENOUS ONCE
Status: COMPLETED | OUTPATIENT
Start: 2022-10-30 | End: 2022-10-30

## 2022-10-30 RX ORDER — ACETAMINOPHEN 500 MG
1000 TABLET ORAL ONCE
Status: COMPLETED | OUTPATIENT
Start: 2022-10-30 | End: 2022-10-30

## 2022-10-30 RX ORDER — DEXAMETHASONE SODIUM PHOSPHATE 10 MG/ML
10 INJECTION INTRAMUSCULAR; INTRAVENOUS ONCE
Status: COMPLETED | OUTPATIENT
Start: 2022-10-30 | End: 2022-10-30

## 2022-10-30 RX ADMIN — METOCLOPRAMIDE HYDROCHLORIDE 10 MG: 5 INJECTION INTRAMUSCULAR; INTRAVENOUS at 13:39

## 2022-10-30 RX ADMIN — DIPHENHYDRAMINE HYDROCHLORIDE 25 MG: 50 INJECTION, SOLUTION INTRAMUSCULAR; INTRAVENOUS at 13:39

## 2022-10-30 RX ADMIN — SODIUM CHLORIDE, POTASSIUM CHLORIDE, SODIUM LACTATE AND CALCIUM CHLORIDE 1000 ML: 600; 310; 30; 20 INJECTION, SOLUTION INTRAVENOUS at 13:39

## 2022-10-30 RX ADMIN — DEXAMETHASONE SODIUM PHOSPHATE 10 MG: 10 INJECTION INTRAMUSCULAR; INTRAVENOUS at 13:39

## 2022-10-30 RX ADMIN — ACETAMINOPHEN 1000 MG: 500 TABLET ORAL at 13:39

## 2022-11-01 LAB — BACTERIA SPEC AEROBE CULT: NORMAL

## 2023-07-27 ENCOUNTER — APPOINTMENT (OUTPATIENT)
Dept: CT IMAGING | Facility: HOSPITAL | Age: 28
End: 2023-07-27
Payer: COMMERCIAL

## 2023-07-27 ENCOUNTER — HOSPITAL ENCOUNTER (EMERGENCY)
Facility: HOSPITAL | Age: 28
Discharge: HOME | End: 2023-07-27
Payer: COMMERCIAL

## 2023-07-27 VITALS
RESPIRATION RATE: 14 BRPM | OXYGEN SATURATION: 96 % | HEIGHT: 62 IN | HEART RATE: 78 BPM | DIASTOLIC BLOOD PRESSURE: 76 MMHG | SYSTOLIC BLOOD PRESSURE: 109 MMHG | TEMPERATURE: 98 F | BODY MASS INDEX: 24.29 KG/M2 | WEIGHT: 132 LBS

## 2023-07-27 DIAGNOSIS — N30.90 CYSTITIS: Primary | ICD-10-CM

## 2023-07-27 LAB
ALBUMIN SERPL-MCNC: 4.6 G/DL (ref 3.5–5.2)
ALBUMIN/GLOB SERPL: 1.8 G/DL
ALP SERPL-CCNC: 66 U/L (ref 39–117)
ALT SERPL W P-5'-P-CCNC: 10 U/L (ref 1–33)
ANION GAP SERPL CALCULATED.3IONS-SCNC: 9 MMOL/L (ref 5–15)
AST SERPL-CCNC: 17 U/L (ref 1–32)
B-HCG UR QL: NEGATIVE
BACTERIA UR QL AUTO: ABNORMAL /HPF
BASOPHILS # BLD AUTO: 0.07 10*3/MM3 (ref 0–0.2)
BASOPHILS NFR BLD AUTO: 0.9 % (ref 0–1.5)
BILIRUB SERPL-MCNC: 0.3 MG/DL (ref 0–1.2)
BILIRUB UR QL STRIP: NEGATIVE
BUN SERPL-MCNC: 13 MG/DL (ref 6–20)
BUN/CREAT SERPL: 26.5 (ref 7–25)
CALCIUM SPEC-SCNC: 9 MG/DL (ref 8.6–10.5)
CHLORIDE SERPL-SCNC: 106 MMOL/L (ref 98–107)
CLARITY UR: ABNORMAL
CLUE CELLS SPEC QL WET PREP: ABNORMAL
CO2 SERPL-SCNC: 26 MMOL/L (ref 22–29)
COLOR UR: YELLOW
CREAT SERPL-MCNC: 0.49 MG/DL (ref 0.57–1)
D-LACTATE SERPL-SCNC: 0.7 MMOL/L (ref 0.5–2)
DEPRECATED RDW RBC AUTO: 46.5 FL (ref 37–54)
EGFRCR SERPLBLD CKD-EPI 2021: 133.5 ML/MIN/1.73
EOSINOPHIL # BLD AUTO: 0.11 10*3/MM3 (ref 0–0.4)
EOSINOPHIL NFR BLD AUTO: 1.4 % (ref 0.3–6.2)
ERYTHROCYTE [DISTWIDTH] IN BLOOD BY AUTOMATED COUNT: 13.4 % (ref 12.3–15.4)
EXPIRATION DATE: NORMAL
GLOBULIN UR ELPH-MCNC: 2.5 GM/DL
GLUCOSE SERPL-MCNC: 94 MG/DL (ref 65–99)
GLUCOSE UR STRIP-MCNC: NEGATIVE MG/DL
HCT VFR BLD AUTO: 36.6 % (ref 34–46.6)
HGB BLD-MCNC: 11.7 G/DL (ref 12–15.9)
HGB UR QL STRIP.AUTO: NEGATIVE
HYALINE CASTS UR QL AUTO: ABNORMAL /LPF
HYDATID CYST SPEC WET PREP: ABNORMAL
IMM GRANULOCYTES # BLD AUTO: 0.01 10*3/MM3 (ref 0–0.05)
IMM GRANULOCYTES NFR BLD AUTO: 0.1 % (ref 0–0.5)
INTERNAL NEGATIVE CONTROL: NEGATIVE
INTERNAL POSITIVE CONTROL: POSITIVE
KETONES UR QL STRIP: ABNORMAL
LEUKOCYTE ESTERASE UR QL STRIP.AUTO: ABNORMAL
LIPASE SERPL-CCNC: 16 U/L (ref 13–60)
LYMPHOCYTES # BLD AUTO: 2.98 10*3/MM3 (ref 0.7–3.1)
LYMPHOCYTES NFR BLD AUTO: 38.8 % (ref 19.6–45.3)
Lab: NORMAL
MAGNESIUM SERPL-MCNC: 2 MG/DL (ref 1.6–2.6)
MCH RBC QN AUTO: 30.5 PG (ref 26.6–33)
MCHC RBC AUTO-ENTMCNC: 32 G/DL (ref 31.5–35.7)
MCV RBC AUTO: 95.3 FL (ref 79–97)
MONOCYTES # BLD AUTO: 0.57 10*3/MM3 (ref 0.1–0.9)
MONOCYTES NFR BLD AUTO: 7.4 % (ref 5–12)
NEUTROPHILS NFR BLD AUTO: 3.94 10*3/MM3 (ref 1.7–7)
NEUTROPHILS NFR BLD AUTO: 51.4 % (ref 42.7–76)
NITRITE UR QL STRIP: POSITIVE
NRBC BLD AUTO-RTO: 0 /100 WBC (ref 0–0.2)
PH UR STRIP.AUTO: 6.5 [PH] (ref 5–8)
PLATELET # BLD AUTO: 218 10*3/MM3 (ref 140–450)
PMV BLD AUTO: 10 FL (ref 6–12)
POTASSIUM SERPL-SCNC: 3.8 MMOL/L (ref 3.5–5.2)
PROT SERPL-MCNC: 7.1 G/DL (ref 6–8.5)
PROT UR QL STRIP: NEGATIVE
RBC # BLD AUTO: 3.84 10*6/MM3 (ref 3.77–5.28)
RBC # UR STRIP: ABNORMAL /HPF
REF LAB TEST METHOD: ABNORMAL
SODIUM SERPL-SCNC: 141 MMOL/L (ref 136–145)
SP GR UR STRIP: 1.03 (ref 1–1.03)
SQUAMOUS #/AREA URNS HPF: ABNORMAL /HPF
T VAGINALIS SPEC QL WET PREP: ABNORMAL
UROBILINOGEN UR QL STRIP: ABNORMAL
WBC # UR STRIP: ABNORMAL /HPF
WBC NRBC COR # BLD: 7.68 10*3/MM3 (ref 3.4–10.8)
WBC SPEC QL WET PREP: ABNORMAL
YEAST GENITAL QL WET PREP: ABNORMAL

## 2023-07-27 PROCEDURE — 87186 SC STD MICRODIL/AGAR DIL: CPT

## 2023-07-27 PROCEDURE — 87086 URINE CULTURE/COLONY COUNT: CPT

## 2023-07-27 PROCEDURE — 25510000001 IOPAMIDOL 61 % SOLUTION

## 2023-07-27 PROCEDURE — 87591 N.GONORRHOEAE DNA AMP PROB: CPT

## 2023-07-27 PROCEDURE — 96375 TX/PRO/DX INJ NEW DRUG ADDON: CPT

## 2023-07-27 PROCEDURE — 81001 URINALYSIS AUTO W/SCOPE: CPT

## 2023-07-27 PROCEDURE — 87491 CHLMYD TRACH DNA AMP PROBE: CPT

## 2023-07-27 PROCEDURE — 96365 THER/PROPH/DIAG IV INF INIT: CPT

## 2023-07-27 PROCEDURE — 85025 COMPLETE CBC W/AUTO DIFF WBC: CPT

## 2023-07-27 PROCEDURE — 83735 ASSAY OF MAGNESIUM: CPT

## 2023-07-27 PROCEDURE — 83690 ASSAY OF LIPASE: CPT

## 2023-07-27 PROCEDURE — 81025 URINE PREGNANCY TEST: CPT

## 2023-07-27 PROCEDURE — 80053 COMPREHEN METABOLIC PANEL: CPT

## 2023-07-27 PROCEDURE — 25010000002 CEFTRIAXONE PER 250 MG

## 2023-07-27 PROCEDURE — 74177 CT ABD & PELVIS W/CONTRAST: CPT

## 2023-07-27 PROCEDURE — 87210 SMEAR WET MOUNT SALINE/INK: CPT

## 2023-07-27 PROCEDURE — 36415 COLL VENOUS BLD VENIPUNCTURE: CPT

## 2023-07-27 PROCEDURE — 99285 EMERGENCY DEPT VISIT HI MDM: CPT

## 2023-07-27 PROCEDURE — 25010000002 ONDANSETRON PER 1 MG

## 2023-07-27 PROCEDURE — 83605 ASSAY OF LACTIC ACID: CPT

## 2023-07-27 RX ORDER — ONDANSETRON 2 MG/ML
4 INJECTION INTRAMUSCULAR; INTRAVENOUS ONCE
Status: COMPLETED | OUTPATIENT
Start: 2023-07-27 | End: 2023-07-27

## 2023-07-27 RX ORDER — CEFDINIR 300 MG/1
300 CAPSULE ORAL 2 TIMES DAILY
Qty: 14 CAPSULE | Refills: 0 | Status: SHIPPED | OUTPATIENT
Start: 2023-07-27 | End: 2023-08-03

## 2023-07-27 RX ORDER — SODIUM CHLORIDE 0.9 % (FLUSH) 0.9 %
10 SYRINGE (ML) INJECTION AS NEEDED
Status: DISCONTINUED | OUTPATIENT
Start: 2023-07-27 | End: 2023-07-27 | Stop reason: HOSPADM

## 2023-07-27 RX ADMIN — IOPAMIDOL 100 ML: 612 INJECTION, SOLUTION INTRAVENOUS at 16:29

## 2023-07-27 RX ADMIN — SODIUM CHLORIDE 1000 ML: 9 INJECTION, SOLUTION INTRAVENOUS at 15:44

## 2023-07-27 RX ADMIN — CEFTRIAXONE 1000 MG: 1 INJECTION, POWDER, FOR SOLUTION INTRAMUSCULAR; INTRAVENOUS at 16:11

## 2023-07-27 RX ADMIN — ONDANSETRON 4 MG: 2 INJECTION INTRAMUSCULAR; INTRAVENOUS at 15:26

## 2023-07-27 NOTE — Clinical Note
[facility] EMERGENCY DEPARTMENT  [address]  [facility] [address]  Phone: [phone]    Dianna Damon was seen and treated in our emergency department on 7/27/2023.  She may return to work on 07/28/2023.         Thank you for choosing [facility].    Maria Elena Fleming, APRN

## 2023-07-27 NOTE — ED PROVIDER NOTES
Subjective   History of Present Illness  Patient is a 27-year-old female that presents emergency department for bilateral lower quadrant abdominal pain for the last 3 days accompanied by nausea and vomiting for the last 2 days.  Patient reports that she has also been experiencing pain located specifically in her bladder for the last 2 days as well.  She denies any dysuria, urgency, frequency or hematuria but states that she has had an increase in mucus in her urine and vaginal discharge.  Patient reports that there is a possibility that she could be pregnant.  States that she was 9 days late when she started her period on 7/18/2023 but only spotted for 1 day in the end of her period was on 7/19/2023 which is very abnormal for her.  Patient denies any use of birth control.  She denies any fevers, body aches, chills, chest pain, shortness of breath, constipation or diarrhea.  She reports that her last bowel movement was yesterday and normal.  Patient denies any primary care provider but states that she does struggle with anxiety and depression and is treated by [facility] for this.  Patient reports she is on some type of anxiety medication but is unsure the name of this.    Review of Systems   Gastrointestinal:  Positive for nausea and vomiting.   Genitourinary:  Positive for pelvic pain and vaginal discharge.        Increased mucus in urine.   All other systems reviewed and are negative.    Past Medical History:   Diagnosis Date    Gonorrhea     + on 4/16/2018... - on 5/17/2018    Herpes     HSV1- valtrex prescribed    Urinary tract infection     Urogenital trichomoniasis     + on 3/29/2018 - on 5/17/2018       No Known Allergies    Past Surgical History:   Procedure Laterality Date    DENTAL PROCEDURE      WISDOM TOOTH EXTRACTION         History reviewed. No pertinent family history.    Social History     Socioeconomic History    Marital status: Single   Tobacco Use    Smoking status: Never    Smokeless  tobacco: Never   Vaping Use    Vaping Use: Never used   Substance and Sexual Activity    Alcohol use: No    Drug use: No    Sexual activity: Yes     Partners: Male     Comment: 2 weeks           Objective   Physical Exam  Vitals and nursing note reviewed.   Constitutional:       Appearance: Normal appearance.      Comments: Nontoxic appearing. In no acute distress.    HENT:      Head: Normocephalic and atraumatic.      Right Ear: External ear normal.      Left Ear: External ear normal.      Nose: Nose normal.      Mouth/Throat:      Mouth: Mucous membranes are moist.      Pharynx: Oropharynx is clear.   Eyes:      Extraocular Movements: Extraocular movements intact.      Conjunctiva/sclera: Conjunctivae normal.      Pupils: Pupils are equal, round, and reactive to light.   Cardiovascular:      Rate and Rhythm: Normal rate and regular rhythm.      Pulses: Normal pulses.      Heart sounds: Normal heart sounds.   Pulmonary:      Effort: Pulmonary effort is normal. No respiratory distress.      Breath sounds: Normal breath sounds. No wheezing.   Chest:      Chest wall: No tenderness.   Abdominal:      General: Abdomen is flat. Bowel sounds are normal.      Palpations: Abdomen is soft.      Tenderness: There is abdominal tenderness in the right lower quadrant, suprapubic area and left lower quadrant. There is no right CVA tenderness, left CVA tenderness, guarding or rebound.   Genitourinary:     Comments: Patient requested to swab herself rather than participate in pelvic exam.   Musculoskeletal:         General: Normal range of motion.      Cervical back: Normal range of motion and neck supple.      Right lower leg: No edema.      Left lower leg: No edema.   Skin:     General: Skin is warm and dry.      Capillary Refill: Capillary refill takes less than 2 seconds.   Neurological:      General: No focal deficit present.      Mental Status: She is alert and oriented to person, place, and time.   Psychiatric:         Mood  and Affect: Mood normal.         Behavior: Behavior normal.         Thought Content: Thought content normal.         Judgment: Judgment normal.          Procedures           ED Course                                           Medical Decision Making  Dianna Damon is a 27 y.o. female who presents to the ED for bilateral lower quadrant abdominal pain for the last 3 days accompanied by nausea and vomiting for the last 2 days.  Patient reports that she has also been experiencing pain located specifically in her bladder for the last 2 days as well.  She denies any dysuria, urgency, frequency or hematuria but states that she has had an increase in mucus in her urine and vaginal discharge.  Patient reports that there is a possibility that she could be pregnant.  States that she was 9 days late when she started her period on 7/18/2023 but only spotted for 1 day in the end of her period was on 7/19/2023 which is very abnormal for her.  Patient denies any use of birth control.  She denies any fevers, body aches, chills, chest pain, shortness of breath, constipation or diarrhea.  She reports that her last bowel movement was yesterday and normal.  Patient denies any primary care provider but states that she does struggle with anxiety and depression and is treated by [facility] for this.  Patient reports she is on some type of anxiety medication but is unsure the name of this.    Patient was non-toxic appearing on arrival. No acute distress was noted. Past medical history, surgical history, and medication regimen reviewed.     Vital signs stable.     Previous encounter, labs, imaging and more reviewed.     Patient's presentation raises suspicion for differentials including, but not limited to, STD, urinary tract infection, pyelonephritis, PID.     Given this, Dianna was placed on the monitor and IV access was obtained as needed. Laboratory studies and imaging studies were ordered.     Medications administered during  ED encounter include the following,   sodium chloride 0.9 % bolus 1,000 mL (0 mL Intravenous Stopped 7/27/23 1614)  ondansetron (ZOFRAN) injection 4 mg (4 mg Intravenous Given 7/27/23 1526)  cefTRIAXone (ROCEPHIN) 1,000 mg in sodium chloride 0.9 % 100 mL IVPB (0 mg Intravenous Stopped 7/27/23 1641)  iopamidol (ISOVUE-300) 61 % injection 100 mL (100 mL Intravenous Given 7/27/23 1629)  On re-evaluation, patient remained hemodynamically stable.     Given findings described above, patient's presentation is likely consistent with cystitis. I have a low suspicion for pyelonephritis at this point in their ED course.      Patients ED presentation, labs and imaging reviewed with Dr. Sorto, attending physician.     Labs Reviewed  WET PREP, GENITAL - Abnormal; Notable for the following components:     WBC'S                           (*)               All other components within normal limits  COMPREHENSIVE METABOLIC PANEL - Abnormal; Notable for the following components:     Creatinine                    0.49 (*)               BUN/Creatinine Ratio          26.5 (*)            All other components within normal limits         Narrative: GFR Normal >60                  Chronic Kidney Disease <60                  Kidney Failure <15                    CBC WITH AUTO DIFFERENTIAL - Abnormal; Notable for the following components:     Hemoglobin                    11.7 (*)            All other components within normal limits  URINALYSIS W/ CULTURE IF INDICATED - Abnormal; Notable for the following components:     Appearance, UA                Cloudy (*)               Ketones, UA                   Trace (*)               Leuk Esterase, UA             Small (1+) (*)               Nitrite, UA                   Positive (*)               Urobilinogen, UA              2.0 E.U./dL (*)            All other components within normal limits         Narrative: In absence of clinical symptoms, the presence of pyuria, bacteria, and/or nitrites on  the urinalysis result does not correlate with infection.  URINALYSIS, MICROSCOPIC ONLY - Abnormal; Notable for the following components:     RBC, UA                       0-2 (*)                WBC, UA                       6-12 (*)               Bacteria, UA                  2+ (*)                 Squamous Epithelial Cells, UA   7-12 (*)            All other components within normal limits  LIPASE - Normal  MAGNESIUM - Normal  LACTIC ACID, PLASMA - Normal  POCT PEFORM URINE PREGNANCY - Normal  CBC AND DIFFERENTIAL     CT Abdomen Pelvis With Contrast   Final Result    1. No solid organ abnormality is seen.    2. The appendix is somewhat difficult to see. The visualized portion    appears normal. There are no secondary signs of appendicitis.    3. Dilated veins in the pelvis and dilated bilateral gonadal veins which    can be seen in pelvic congestion syndrome. In addition, the left gonadal    vein empties into the left renal vein which has a retroaortic return to    the inferior vena cava. There is compression of the left retroaortic    renal vein between the aorta and spine. Correlate with symptoms of    pelvic congestion syndrome.    This report was finalized on 07/27/2023 16:46 by Dr. Ryne Garcia MD.      I went over ED workup and results of labs and imaging with patient and family member present at bedside. I discussed with the patient that it appears as though she has a urinary tract infection that was treated with rocephin IV and patient will be sent home with antibiotics to treat this. I also discussed that CT results did show some pelvic congestion that could be related to several things such as UTI, repeated STI's etc. Because of this, I discussed that it would be important to follow up with OBGYN for further evaluation and follow up of this. Patient was also informed that we will call her regarding her chlamydia and gonorrhea swab results.  I did stress the importance of pelvic rest to the patient and  also that it would be beneficial that her partner be treated as well for STDs in order to prevent repeat STD exposure. I answered all the questions regarding the emergency department evaluation, diagnosis, and treatment plan in plain and simple language that was understandable. I said that there is always some diagnostic uncertainty in the ER and went over the fact that the symptoms may change or new symptoms may reveal themselves after being discharged. Because of this, I said that it is very important that Dianna follows up, by calling as soon as possible to set up an appointment, with the OB/GYN as well as her primary care doctor within the next few days or as soon as reasonably possible so that the symptoms can be re-evaluated for improvement or for any other questions. I also gave Dianna common sense return precautions and encouraged a quick return to the emergency department within 24 - 48hrs if there are any new, worsening, or concerning symptoms. The patient verbalized understanding of the discharge instructions and agreed with them. Dianna was discharged in stable condition and was observed ambulating out of the ER.     Problems Addressed:  Cystitis: complicated acute illness or injury    Amount and/or Complexity of Data Reviewed  Labs: ordered.  Radiology: ordered.    Risk  Prescription drug management.        Final diagnoses:   Cystitis       ED Disposition  ED Disposition       ED Disposition   Discharge    Condition   Stable    Comment   --               Lavelle Velasquez MD  [address] [address]  [phone]    Schedule an appointment as soon as possible for a visit       PATIENT CONNECTION - [facility] [address]  [phone]  Schedule an appointment as soon as possible for a visit       [facility] EMERGENCY DEPARTMENT  [address] [facility] [id number] [phone]    If symptoms worsen         Medication List        New Prescriptions       cefdinir 300 MG capsule  Commonly known as: OMNICEF  Take 1 capsule by mouth 2 (Two) Times a Day for 7 days.               Where to Get Your Medications        These medications were sent to [facility], [place] - [address] [facility] - [phone]  - [phone] [address] [address]      Phone: [phone]   cefdinir 300 MG capsule            Maria Elena Fleming, APRN  07/31/23 0954

## 2023-07-27 NOTE — Clinical Note
[facility] EMERGENCY DEPARTMENT  [address]  [facility] [address]  Phone: [phone]    Dianna Damon was seen and treated in our emergency department on 7/27/2023.  She may return to work on 07/28/2023.         Thank you for choosing [facility].    Maria Elena Fleming, APRN

## 2023-07-27 NOTE — Clinical Note
[facility] EMERGENCY DEPARTMENT  [address]  [facility] [address]  Phone: [phone]    Dianna Damon was seen and treated in our emergency department on 7/27/2023.  She may return to work on 07/28/2023.         Thank you for choosing [facility].    Maria Elena Fleming, APRN

## 2023-07-27 NOTE — Clinical Note
[facility] EMERGENCY DEPARTMENT  [address]  [facility] [address]  Phone: [phone]    Dianna Damon was seen and treated in our emergency department on 7/27/2023.  She may return to work on 07/28/2023.         Thank you for choosing [facility].    Maria Elena Fleming, APRN

## 2023-07-27 NOTE — DISCHARGE INSTRUCTIONS
It was very nice to meet you, Dianna. Thank you for allowing us to take care of you today at [facility].    Your evaluation today did not show any emergent findings or have any emergent indications for admission to the hospital.     Please understand that an ER evaluation is just the start of your evaluation. We do the best we can, but we are often unable to fully figure out what is causing your symptoms from one evaluation. Thus, our goal is to determine whether you need to be evaluated in the hospital or if it is safe for you to go home and see other doctors such as a primary care physician or a specialist on an outpatient basis.     Like we discussed, it is very important that you follow up with your primary care doctor (call them to set up an appointment) within the next few days or as soon as possible so that you can be re-evaluated for improvement in your symptoms or for any other questions.     A copy of your results should be included in your paperwork. If you were prescribed any medications, please take them as directed or call us back with any questions.    Please return to the emergency room within 12-48 hours if you experience fever, chills, chest pain or shortness of breath, pain with inspiration/expiration, pain that travels to your arms, neck or back, nausea, vomiting, severe headache, tearing pain in your chest, dizziness, feel as though you are about to pass out, have any worsening symptoms, or any other concerns.

## 2023-07-28 LAB
C TRACH RRNA CVX QL NAA+PROBE: DETECTED
N GONORRHOEA RRNA SPEC QL NAA+PROBE: DETECTED

## 2023-07-29 LAB — BACTERIA SPEC AEROBE CULT: ABNORMAL

## 2023-07-31 RX ORDER — DOXYCYCLINE 100 MG/1
100 CAPSULE ORAL 2 TIMES DAILY
Qty: 14 CAPSULE | Refills: 0 | Status: SHIPPED | OUTPATIENT
Start: 2023-07-31 | End: 2023-08-07

## 2023-08-29 ENCOUNTER — TELEPHONE (OUTPATIENT)
Dept: OBSTETRICS AND GYNECOLOGY | Facility: CLINIC | Age: 28
End: 2023-08-29
Payer: COMMERCIAL

## 2023-08-29 NOTE — TELEPHONE ENCOUNTER
Pt called in and has an u/s and appt scheduled tomorrow with Dr. Velasquez as a f/u from her most recent ER visit. Pt states she has taken a +UPT today and is wanting to know if we can see her as a new OB tomorrow as well. LMP 7/18/23. Per Dr. Velasquez, pt can be seen as new OB with u/s to also f/u from ER visit. Pt advised on this and voiced understanding

## 2023-08-30 ENCOUNTER — INITIAL PRENATAL (OUTPATIENT)
Dept: OBSTETRICS AND GYNECOLOGY | Facility: CLINIC | Age: 28
End: 2023-08-30
Payer: COMMERCIAL

## 2023-08-30 VITALS — BODY MASS INDEX: 23.08 KG/M2 | SYSTOLIC BLOOD PRESSURE: 104 MMHG | WEIGHT: 126.2 LBS | DIASTOLIC BLOOD PRESSURE: 78 MMHG

## 2023-08-30 DIAGNOSIS — Z86.19 HISTORY OF CHLAMYDIA: ICD-10-CM

## 2023-08-30 DIAGNOSIS — Z86.19 HISTORY OF GONORRHEA: ICD-10-CM

## 2023-08-30 DIAGNOSIS — Z3A.01 6 WEEKS GESTATION OF PREGNANCY: ICD-10-CM

## 2023-08-30 DIAGNOSIS — Z64.1 MULTIGRAVIDA: Primary | ICD-10-CM

## 2023-08-30 RX ORDER — TRAZODONE HYDROCHLORIDE 100 MG/1
TABLET ORAL
COMMUNITY
Start: 2023-07-13 | End: 2023-08-30

## 2023-08-30 RX ORDER — SERTRALINE HYDROCHLORIDE 25 MG/1
TABLET, FILM COATED ORAL
COMMUNITY
Start: 2023-08-29

## 2023-08-30 RX ORDER — ESCITALOPRAM OXALATE 10 MG/1
1 TABLET ORAL DAILY
COMMUNITY
Start: 2023-07-28 | End: 2023-08-30

## 2023-08-30 RX ORDER — DESVENLAFAXINE 25 MG/1
1 TABLET, EXTENDED RELEASE ORAL DAILY
COMMUNITY
Start: 2023-08-02 | End: 2023-08-30

## 2023-08-30 NOTE — PROGRESS NOTES
[facility]   HISTORY AND PHYSICAL  Subjective   Subjective     Chief Complaint:   Chief Complaint   Patient presents with    Initial Prenatal Visit     Patient here for New OB appointment, patient had ultrasound showing IUP today 6w2d. Patient denies questions or concerns. Patient denies vaginal bleeding or pelvic.        History of Present Illness  Dianna Damon is a 27 y.o. female  who presents for New OB. Went to ED 1 month ago. Had UTI, Gonorrhea/chlamydia at that time. Pregnancy test following this was positive. No complaints today. No bleeding or pelvic pain. No nausea or vomiting. Normal bowel movements and voids. Unexpected pregnancy. No contraception at time of conception. H/o VAVD in 1st delivery for NRFHTs.    Review of Systems   Genitourinary:  Negative for decreased urine volume, difficulty urinating, dyspareunia, dysuria, enuresis, flank pain, frequency, genital sores, hematuria, menstrual problem, pelvic pain, urgency, vaginal bleeding, vaginal discharge and vaginal pain.   All other systems reviewed and are negative.     Personal History     OB History    Para Term  AB Living   4 2 2 0 1 2   SAB IAB Ectopic Molar Multiple Live Births   1 0 0 0 0 2      # Outcome Date GA Lbr Curtis/2nd Weight Sex Delivery Anes PTL Lv   4 Current            3 Term 19 39w2d 03:45 / 00:11 3005 g (6 lb 10 oz) M Vag-Spont EPI N ROLO      Birth Comments: 33cm - HC      Name: JEMMA DAMON      Apgar1: 8  Apgar5: 9   2 Term 18 39w1d / 00:31 3690 g (8 lb 2.2 oz) M Vag-Vacuum EPI N ROLO      Birth Comments: 33 cm; AGA      Name: JEMMA DAMON      Apgar1: 7  Apgar5: 9   1 SAB              Past Medical History:   Diagnosis Date    Depression 2023    Gonorrhea     + on 2018... - on 2018    Herpes     HSV1- valtrex prescribed    Recurrent pregnancy loss, antepartum condition or complication 10/08/2020    Urinary tract infection     Urogenital trichomoniasis     +  on 3/29/2018 - on 2018     Past Surgical History:   Procedure Laterality Date    DENTAL PROCEDURE      WISDOM TOOTH EXTRACTION         Home Medications:  sertraline    Allergies:  She has No Known Allergies.    Objective    Objective     Vitals:   BP: (104)/(78) 104/78    Physical Exam  Vitals reviewed.   Constitutional:       General: She is not in acute distress.     Appearance: Normal appearance. She is not ill-appearing.   HENT:      Head: Normocephalic and atraumatic.      Nose: No congestion or rhinorrhea.   Eyes:      General: No scleral icterus.        Right eye: No discharge.         Left eye: No discharge.      Extraocular Movements: Extraocular movements intact.      Conjunctiva/sclera: Conjunctivae normal.   Pulmonary:      Effort: Pulmonary effort is normal. No accessory muscle usage or respiratory distress.   Musculoskeletal:      Right lower leg: No edema.      Left lower leg: No edema.   Skin:     General: Skin is warm and dry.      Coloration: Skin is not ashen, cyanotic or jaundiced.   Neurological:      General: No focal deficit present.      Mental Status: She is alert and oriented to person, place, and time.   Psychiatric:         Mood and Affect: Mood normal.         Behavior: Behavior is cooperative.       Result Review    US Ob Transvaginal (2023 14:11)   IUP c/w 6w1d   bpm    Chlamydia trachomatis, Neisseria gonorrhoeae, PCR - Swab, Cervix (2023 16:07) +chlamydia, +gonorrhea  Wet Prep, Genital - Swab, Vagina (2023 16:07) negative trichomonas/BV/yeast    CBC & Differential (2023 15:15) Hgb 11.7  Comprehensive Metabolic Panel (2023 15:15) unremarkable    Urine Culture - Urine, Urine, Clean Catch (2023 15:04) E coli     RhIg Evaluation (10/06/2020 22:52) O negative    Assessment & Plan   Assessment / Plan     Diagnoses and all orders for this visit:    1. Multigravida (Primary)    2. 6 weeks gestation of pregnancy    3. History of  gonorrhea    4. History of chlamydia        Discussion:   New OB Visit. US ordered today, reviewed and shows 6 weeks gestation, EDC 4/23/2024.  Prior uncomplicated pregnancy and vaginal delivery x2. First was a vacuum delivery secondary to NRFHTs.  She is scheduled to see Dr. Lei tomorrow for prenatal care. RTC clinic as needed. Recommended prenatal labs with Dr. Lei at that time. Discussed that if she wants to continue prenatal care with us, she should call us to let us know. She expressed understanding.     Return if symptoms worsen or fail to improve.    Lavelle Velasquez MD

## 2023-08-31 ENCOUNTER — REFERRAL TRIAGE (OUTPATIENT)
Dept: LABOR AND DELIVERY | Facility: HOSPITAL | Age: 28
End: 2023-08-31
Payer: COMMERCIAL

## 2023-09-07 ENCOUNTER — TELEPHONE (OUTPATIENT)
Dept: OBSTETRICS AND GYNECOLOGY | Facility: CLINIC | Age: 28
End: 2023-09-07

## 2023-09-07 NOTE — TELEPHONE ENCOUNTER
Caller: Dianna Damon    Relationship: Self    Best call back number: [phone] / [facility]     Pharmacy where request should be sent: [facility] - CON[facility]IRMED    Next office visit with prescribing clinician: 9/27/2023     Additional details provided by patient: PT IS STILL NAUSEA - VOMITING - PT CAN NOT KEEP ANYTHING DOWN EVEN LIQUID- PT IS WANTING TO KNOW IF SOMETHING ELSE CAN BE CALLED INTO HER PHARMACY TO HELP HER    PLEASE CALL THE PT TO LET HER KNOW IF THIS REQUEST CAN BE MADE    THANK YOU!

## 2023-09-07 NOTE — TELEPHONE ENCOUNTER
Called patient. She reports that she can not even keep water down and she continues to vomit. I have advised patient that she needs to be seen ER. She reports she will go and call us if she has further concerns/questions.

## 2023-09-10 ENCOUNTER — HOSPITAL ENCOUNTER (EMERGENCY)
Facility: HOSPITAL | Age: 28
Discharge: HOME | End: 2023-09-10
Admitting: EMERGENCY MEDICINE
Payer: COMMERCIAL

## 2023-09-10 VITALS
HEIGHT: 62 IN | SYSTOLIC BLOOD PRESSURE: 98 MMHG | OXYGEN SATURATION: 98 % | TEMPERATURE: 97.9 F | BODY MASS INDEX: 23.19 KG/M2 | WEIGHT: 126 LBS | HEART RATE: 81 BPM | RESPIRATION RATE: 16 BRPM | DIASTOLIC BLOOD PRESSURE: 67 MMHG

## 2023-09-10 DIAGNOSIS — O21.0 HYPEREMESIS GRAVIDARUM: Primary | ICD-10-CM

## 2023-09-10 LAB
ABO GROUP BLD: NORMAL
ALBUMIN SERPL-MCNC: 4.9 G/DL (ref 3.5–5.2)
ALBUMIN/GLOB SERPL: 1.7 G/DL
ALP SERPL-CCNC: 63 U/L (ref 39–117)
ALT SERPL W P-5'-P-CCNC: 11 U/L (ref 1–33)
ANION GAP SERPL CALCULATED.3IONS-SCNC: 9 MMOL/L (ref 5–15)
AST SERPL-CCNC: 13 U/L (ref 1–32)
BACTERIA UR QL AUTO: ABNORMAL /HPF
BASOPHILS # BLD AUTO: 0.06 10*3/MM3 (ref 0–0.2)
BASOPHILS NFR BLD AUTO: 0.7 % (ref 0–1.5)
BILIRUB SERPL-MCNC: 0.4 MG/DL (ref 0–1.2)
BILIRUB UR QL STRIP: NEGATIVE
BLD GP AB SCN SERPL QL: NEGATIVE
BUN SERPL-MCNC: 8 MG/DL (ref 6–20)
BUN/CREAT SERPL: 22.9 (ref 7–25)
CALCIUM SPEC-SCNC: 9.5 MG/DL (ref 8.6–10.5)
CHLORIDE SERPL-SCNC: 102 MMOL/L (ref 98–107)
CLARITY UR: ABNORMAL
CO2 SERPL-SCNC: 25 MMOL/L (ref 22–29)
COLOR UR: YELLOW
CREAT SERPL-MCNC: 0.35 MG/DL (ref 0.57–1)
DEPRECATED RDW RBC AUTO: 42 FL (ref 37–54)
EGFRCR SERPLBLD CKD-EPI 2021: 144.8 ML/MIN/1.73
EOSINOPHIL # BLD AUTO: 0.05 10*3/MM3 (ref 0–0.4)
EOSINOPHIL NFR BLD AUTO: 0.6 % (ref 0.3–6.2)
ERYTHROCYTE [DISTWIDTH] IN BLOOD BY AUTOMATED COUNT: 12.1 % (ref 12.3–15.4)
GLOBULIN UR ELPH-MCNC: 2.9 GM/DL
GLUCOSE SERPL-MCNC: 118 MG/DL (ref 65–99)
GLUCOSE UR STRIP-MCNC: NEGATIVE MG/DL
HCG INTACT+B SERPL-ACNC: NORMAL MIU/ML
HCT VFR BLD AUTO: 40.3 % (ref 34–46.6)
HGB BLD-MCNC: 13.4 G/DL (ref 12–15.9)
HGB UR QL STRIP.AUTO: NEGATIVE
HYALINE CASTS UR QL AUTO: ABNORMAL /LPF
IMM GRANULOCYTES # BLD AUTO: 0.03 10*3/MM3 (ref 0–0.05)
IMM GRANULOCYTES NFR BLD AUTO: 0.3 % (ref 0–0.5)
KETONES UR QL STRIP: NEGATIVE
LEUKOCYTE ESTERASE UR QL STRIP.AUTO: ABNORMAL
LIPASE SERPL-CCNC: 18 U/L (ref 13–60)
LYMPHOCYTES # BLD AUTO: 2.59 10*3/MM3 (ref 0.7–3.1)
LYMPHOCYTES NFR BLD AUTO: 30.2 % (ref 19.6–45.3)
MCH RBC QN AUTO: 31 PG (ref 26.6–33)
MCHC RBC AUTO-ENTMCNC: 33.3 G/DL (ref 31.5–35.7)
MCV RBC AUTO: 93.3 FL (ref 79–97)
MONOCYTES # BLD AUTO: 0.53 10*3/MM3 (ref 0.1–0.9)
MONOCYTES NFR BLD AUTO: 6.2 % (ref 5–12)
MUCOUS THREADS URNS QL MICRO: ABNORMAL /HPF
NEUTROPHILS NFR BLD AUTO: 5.32 10*3/MM3 (ref 1.7–7)
NEUTROPHILS NFR BLD AUTO: 62 % (ref 42.7–76)
NITRITE UR QL STRIP: NEGATIVE
NRBC BLD AUTO-RTO: 0 /100 WBC (ref 0–0.2)
NUMBER OF DOSES: NORMAL
PH UR STRIP.AUTO: 6 [PH] (ref 5–8)
PLATELET # BLD AUTO: 254 10*3/MM3 (ref 140–450)
PMV BLD AUTO: 9.8 FL (ref 6–12)
POTASSIUM SERPL-SCNC: 3.9 MMOL/L (ref 3.5–5.2)
PROT SERPL-MCNC: 7.8 G/DL (ref 6–8.5)
PROT UR QL STRIP: NEGATIVE
RBC # BLD AUTO: 4.32 10*6/MM3 (ref 3.77–5.28)
RBC # UR STRIP: ABNORMAL /HPF
REF LAB TEST METHOD: ABNORMAL
RH BLD: NEGATIVE
SODIUM SERPL-SCNC: 136 MMOL/L (ref 136–145)
SP GR UR STRIP: 1.02 (ref 1–1.03)
SQUAMOUS #/AREA URNS HPF: ABNORMAL /HPF
UROBILINOGEN UR QL STRIP: ABNORMAL
WBC # UR STRIP: ABNORMAL /HPF
WBC NRBC COR # BLD: 8.58 10*3/MM3 (ref 3.4–10.8)

## 2023-09-10 PROCEDURE — 99283 EMERGENCY DEPT VISIT LOW MDM: CPT

## 2023-09-10 PROCEDURE — 84702 CHORIONIC GONADOTROPIN TEST: CPT | Performed by: EMERGENCY MEDICINE

## 2023-09-10 PROCEDURE — 86901 BLOOD TYPING SEROLOGIC RH(D): CPT | Performed by: PHYSICIAN ASSISTANT

## 2023-09-10 PROCEDURE — 85025 COMPLETE CBC W/AUTO DIFF WBC: CPT | Performed by: EMERGENCY MEDICINE

## 2023-09-10 PROCEDURE — 86850 RBC ANTIBODY SCREEN: CPT | Performed by: PHYSICIAN ASSISTANT

## 2023-09-10 PROCEDURE — 81001 URINALYSIS AUTO W/SCOPE: CPT | Performed by: EMERGENCY MEDICINE

## 2023-09-10 PROCEDURE — 96374 THER/PROPH/DIAG INJ IV PUSH: CPT

## 2023-09-10 PROCEDURE — P9612 CATHETERIZE FOR URINE SPEC: HCPCS

## 2023-09-10 PROCEDURE — 86900 BLOOD TYPING SEROLOGIC ABO: CPT | Performed by: PHYSICIAN ASSISTANT

## 2023-09-10 PROCEDURE — 80053 COMPREHEN METABOLIC PANEL: CPT | Performed by: EMERGENCY MEDICINE

## 2023-09-10 PROCEDURE — 25010000002 ONDANSETRON PER 1 MG: Performed by: EMERGENCY MEDICINE

## 2023-09-10 PROCEDURE — 83690 ASSAY OF LIPASE: CPT | Performed by: EMERGENCY MEDICINE

## 2023-09-10 RX ORDER — SODIUM CHLORIDE, SODIUM LACTATE, POTASSIUM CHLORIDE, CALCIUM CHLORIDE 600; 310; 30; 20 MG/100ML; MG/100ML; MG/100ML; MG/100ML
125 INJECTION, SOLUTION INTRAVENOUS CONTINUOUS
Status: DISCONTINUED | OUTPATIENT
Start: 2023-09-10 | End: 2023-09-10 | Stop reason: HOSPADM

## 2023-09-10 RX ORDER — ONDANSETRON 2 MG/ML
4 INJECTION INTRAMUSCULAR; INTRAVENOUS ONCE
Status: COMPLETED | OUTPATIENT
Start: 2023-09-10 | End: 2023-09-10

## 2023-09-10 RX ORDER — DOXYLAMINE SUCCINATE AND PYRIDOXINE HYDROCHLORIDE, DELAYED RELEASE TABLETS 10 MG/10 MG 10; 10 MG/1; MG/1
2 TABLET, DELAYED RELEASE ORAL ONCE
Status: COMPLETED | OUTPATIENT
Start: 2023-09-10 | End: 2023-09-10

## 2023-09-10 RX ORDER — ONDANSETRON 4 MG/1
4 TABLET, ORALLY DISINTEGRATING ORAL EVERY 6 HOURS PRN
Qty: 10 TABLET | Refills: 0 | Status: SHIPPED | OUTPATIENT
Start: 2023-09-10

## 2023-09-10 RX ORDER — ONDANSETRON 2 MG/ML
4 INJECTION INTRAMUSCULAR; INTRAVENOUS ONCE
Status: DISCONTINUED | OUTPATIENT
Start: 2023-09-10 | End: 2023-09-10 | Stop reason: HOSPADM

## 2023-09-10 RX ORDER — DOXYLAMINE SUCCINATE AND PYRIDOXINE HYDROCHLORIDE, DELAYED RELEASE TABLETS 10 MG/10 MG 10; 10 MG/1; MG/1
2 TABLET, DELAYED RELEASE ORAL NIGHTLY PRN
Qty: 30 TABLET | Refills: 0 | Status: SHIPPED | OUTPATIENT
Start: 2023-09-10

## 2023-09-10 RX ADMIN — SODIUM CHLORIDE, POTASSIUM CHLORIDE, SODIUM LACTATE AND CALCIUM CHLORIDE 1000 ML: 600; 310; 30; 20 INJECTION, SOLUTION INTRAVENOUS at 13:11

## 2023-09-10 RX ADMIN — ONDANSETRON 4 MG: 2 INJECTION INTRAMUSCULAR; INTRAVENOUS at 13:10

## 2023-09-10 RX ADMIN — DOXYLAMINE SUCCINATE AND PYRIDOXINE HYDROCHLORIDE 2 TABLET: 10; 10 TABLET, DELAYED RELEASE ORAL at 15:39

## 2023-09-10 NOTE — ED PROVIDER NOTES
Subjective   History of Present Illness    Patient is a pleasant 27-year-old female chief complaint of persistent vomiting.  Patient has a history of G4, P2, miscarriage 0.  She is unsure of her blood type but she thinks she is O+.  She reports that she is about 7 weeks gestation.  She has been nauseated and vomiting throughout the entire pregnancy.  However, in the past 1 week, has escalated where she is vomiting persistently around the clock.  She complains of a little bit of weight gain but she does not know exactly how much.  She denies any fever.  She denies any diarrhea.  She denies dysuria, hematuria, flank pain, vaginal bleeding, discharge, or abdominal pain.  She feels dehydrated so she came to the ER to be further evaluated.    Review of Systems   Constitutional:  Positive for activity change.   HENT: Negative.     Respiratory: Negative.     Cardiovascular: Negative.    Gastrointestinal:  Positive for nausea and vomiting. Negative for abdominal pain and diarrhea.   Genitourinary: Negative.  Negative for decreased urine volume and vaginal bleeding.   Musculoskeletal: Negative.    Skin: Negative.    Psychiatric/Behavioral: Negative.     All other systems reviewed and are negative.    Past Medical History:   Diagnosis Date    Depression 07/01/2023    Gonorrhea     + on 4/16/2018... - on 5/17/2018    Herpes     HSV1- valtrex prescribed    Recurrent pregnancy loss, antepartum condition or complication 10/08/2020    Urinary tract infection     Urogenital trichomoniasis     + on 3/29/2018 - on 5/17/2018       No Known Allergies    Past Surgical History:   Procedure Laterality Date    DENTAL PROCEDURE      WISDOM TOOTH EXTRACTION         Family History   Problem Relation Age of Onset    Diabetes Maternal Grandmother     Diabetes Maternal Grandfather     Breast cancer Neg Hx     Ovarian cancer Neg Hx     Uterine cancer Neg Hx     Colon cancer Neg Hx        Social History     Socioeconomic History    Marital status:  "Single   Tobacco Use    Smoking status: Never    Smokeless tobacco: Never   Vaping Use    Vaping Use: Never used   Substance and Sexual Activity    Alcohol use: Not Currently     Alcohol/week: 1.0 standard drink     Types: 1 Drinks containing 0.5 oz of alcohol per week    Drug use: No    Sexual activity: Yes     Partners: Male     Birth control/protection: None     Comment: 2 weeks       Prior to Admission medications    Medication Sig Start Date End Date Taking? Authorizing Provider   sertraline (ZOLOFT) 25 MG tablet  8/29/23   Provider, MD Rebecca       Medications   lactated ringers infusion (125 mL/hr Intravenous Not Given 9/10/23 1426)   doxylamine-pyridoxine (DICLEGIS) EC tablet 2 tablet (has no administration in time range)   ondansetron (ZOFRAN) injection 4 mg (4 mg Intravenous Not Given 9/10/23 1426)   lactated ringers bolus 1,000 mL (0 mL Intravenous Stopped 9/10/23 1524)   ondansetron (ZOFRAN) injection 4 mg (4 mg Intravenous Given 9/10/23 1310)       /65 (BP Location: Right arm, Patient Position: Sitting)   Pulse 87   Temp 98 °F (36.7 °C) (Temporal)   Resp 17   Ht 157.5 cm (62\")   Wt 57.2 kg (126 lb)   LMP 07/18/2023 (Approximate)   SpO2 98%   BMI 23.05 kg/m²       Objective   Physical Exam  Vitals and nursing note reviewed.   Constitutional:       General: She is not in acute distress.     Appearance: She is well-developed. She is not diaphoretic.   HENT:      Head: Normocephalic and atraumatic.   Eyes:      Conjunctiva/sclera: Conjunctivae normal.      Pupils: Pupils are equal, round, and reactive to light.   Neck:      Trachea: No tracheal deviation.   Cardiovascular:      Rate and Rhythm: Normal rate and regular rhythm.      Heart sounds: Normal heart sounds. No murmur heard.  Pulmonary:      Effort: Pulmonary effort is normal.      Breath sounds: Normal breath sounds.   Abdominal:      General: Bowel sounds are normal. There is no distension.      Palpations: Abdomen is soft. There " is no mass.      Tenderness: There is no abdominal tenderness. There is no guarding or rebound.   Musculoskeletal:         General: Normal range of motion.      Cervical back: Normal range of motion and neck supple.   Skin:     General: Skin is warm and dry.      Capillary Refill: Capillary refill takes less than 2 seconds.   Neurological:      General: No focal deficit present.      Mental Status: She is alert and oriented to person, place, and time.   Psychiatric:         Mood and Affect: Mood normal.         Behavior: Behavior normal.         Thought Content: Thought content normal.         Judgment: Judgment normal.       Procedures         Lab Results (last 24 hours)       Procedure Component Value Units Date/Time    Comprehensive Metabolic Panel [[id number]]  (Abnormal) Collected: 09/10/23 1311    Specimen: Blood Updated: 09/10/23 1351     Glucose 118 mg/dL      BUN 8 mg/dL      Creatinine 0.35 mg/dL      Sodium 136 mmol/L      Potassium 3.9 mmol/L      Chloride 102 mmol/L      CO2 25.0 mmol/L      Calcium 9.5 mg/dL      Total Protein 7.8 g/dL      Albumin 4.9 g/dL      ALT (SGPT) 11 U/L      AST (SGOT) 13 U/L      Alkaline Phosphatase 63 U/L      Total Bilirubin 0.4 mg/dL      Globulin 2.9 gm/dL      A/G Ratio 1.7 g/dL      BUN/Creatinine Ratio 22.9     Anion Gap 9.0 mmol/L      eGFR 144.8 mL/min/1.73     Narrative:      GFR Normal >60  Chronic Kidney Disease <60  Kidney Failure <15      CBC & Differential [[id number]]  (Abnormal) Collected: 09/10/23 1311    Specimen: Blood Updated: 09/10/23 1328    Narrative:      The following orders were created for panel order CBC & Differential.  Procedure                               Abnormality         Status                     ---------                               -----------         ------                     CBC Auto Differential[[id number]]        Abnormal            Final result                 Please view results for these tests on the individual orders.     Lipase [[id number]]  (Normal) Collected: 09/10/23 1311    Specimen: Blood Updated: 09/10/23 1346     Lipase 18 U/L     hCG, Quantitative, Pregnancy [[id number]] Collected: 09/10/23 1311    Specimen: Blood Updated: 09/10/23 1417     HCG Quantitative 111,915.00 mIU/mL     Narrative:      HCG Ranges by Gestational Age    Females - non-pregnant premenopausal   </= 1mIU/mL HCG  Females - postmenopausal               </= 7mIU/mL HCG    3 Weeks         5.8 -    71.2 mIU/mL  4 Weeks         9.5 -     750 mIU/mL  5 Weeks         217 -   7,138 mIU/mL  6 Weeks         158 -  31,795 mIU/mL  7 Weeks       3,697 - 163,563 mIU/mL  8 Weeks      32,065 - 149,571 mIU/mL  9 Weeks      63,803 - 151,410 mIU/mL  10 Weeks     46,509 - 186,977 mIU/mL  12 Weeks     27,832 - 210,612 mIU/mL  14 Weeks     13,950 -  62,530 mIU/mL  15 Weeks     12,039 -  70,971 mIU/mL  16 Weeks      9,040 -  56,451 mIU/mL  17 Weeks      8,175 -  55,868 mIU/mL  18 Weeks      8,099 -  58,176 mIU/mL    Urinalysis With Culture If Indicated - Urine, Catheter [[id number]]  (Abnormal) Collected: 09/10/23 1311    Specimen: Urine, Catheter Updated: 09/10/23 1346     Color, UA Yellow     Appearance, UA Cloudy     pH, UA 6.0     Specific Gravity, UA 1.025     Glucose, UA Negative     Ketones, UA Negative     Bilirubin, UA Negative     Blood, UA Negative     Protein, UA Negative     Leuk Esterase, UA Small (1+)     Nitrite, UA Negative     Urobilinogen, UA 1.0 E.U./dL    Narrative:      In absence of clinical symptoms, the presence of pyuria, bacteria, and/or nitrites on the urinalysis result does not correlate with infection.    CBC Auto Differential [[id number]]  (Abnormal) Collected: 09/10/23 1311    Specimen: Blood Updated: 09/10/23 1328     WBC 8.58 10*3/mm3      RBC 4.32 10*6/mm3      Hemoglobin 13.4 g/dL      Hematocrit 40.3 %      MCV 93.3 fL      MCH 31.0 pg      MCHC 33.3 g/dL      RDW 12.1 %      RDW-SD 42.0 fl      MPV 9.8 fL      Platelets 254 10*3/mm3       Neutrophil % 62.0 %      Lymphocyte % 30.2 %      Monocyte % 6.2 %      Eosinophil % 0.6 %      Basophil % 0.7 %      Immature Grans % 0.3 %      Neutrophils, Absolute 5.32 10*3/mm3      Lymphocytes, Absolute 2.59 10*3/mm3      Monocytes, Absolute 0.53 10*3/mm3      Eosinophils, Absolute 0.05 10*3/mm3      Basophils, Absolute 0.06 10*3/mm3      Immature Grans, Absolute 0.03 10*3/mm3      nRBC 0.0 /100 WBC     Urinalysis, Microscopic Only - Urine, Catheter [[id number]]  (Abnormal) Collected: 09/10/23 1311    Specimen: Urine, Catheter Updated: 09/10/23 1346     RBC, UA None Seen /HPF      WBC, UA 0-2 /HPF      Comment: Urine culture not indicated.        Bacteria, UA Trace /HPF      Squamous Epithelial Cells, UA 3-6 /HPF      Hyaline Casts, UA None Seen /LPF      Mucus, UA Trace /HPF      Methodology Manual Light Microscopy            US Ob Transvaginal    Result Date: 2023  Narrative: Table formatting from the original result was not included. [facility] Obstetrical Ultrasound Report Encounter date:  23 Patient:  Dianna Damon  MR#:[id number] 27 y.o.  Estimated Date of Delivery: 24   Indication: Confirmation of dating Patient Active Problem List Diagnosis (none) - all problems resolved or deleted Impression: Intrauterine pregnancy at 6w1d Dating scan Viable first trimester gestation,  Relevant comparison data: No relevant comparison data Maged Ortega MD 2023 16:35 CDT --------------------------------------------------------------------------- -- [facility] [address], [address] [facility] [address] [phone] --------------------------------------------------------------------------- ---      ED Course  ED Course as of 09/10/23 1536   Sun Sep 10, 2023   1532 Patient has been reassessed and reports feeling better after the antiemetics.  No vomiting noted while here in the ED.  She  tolerated p.o. challenge.  Patient again denies any vaginal bleeding or abdominal pain.  She does not have any signs of miscarriage.  She is O- and blood type.  Advised to follow-up with her OB/GYN.  Strict return precaution advised.  Patient will be discharged in stable condition.  Prescription for Zofran and Diclegis has been ordered. [TK]      ED Course User Index  [TK] Eleazar Wesley PA          [facility]      Final diagnoses:   Hyperemesis gravidarum       Disposition: Patient be discharged in stable condition.       Eleazar Wesley PA  09/10/23 1536

## 2023-09-11 ENCOUNTER — TELEPHONE (OUTPATIENT)
Dept: OBSTETRICS AND GYNECOLOGY | Facility: CLINIC | Age: 28
End: 2023-09-11
Payer: COMMERCIAL

## 2023-09-11 NOTE — TELEPHONE ENCOUNTER
Pt called stating that the prescribed Diclegis from the ER is not covered under her insurance. Pt was also prescribed Zofran per the ER. Pt advised to take unisom and B6 combo and use Zofran sparingly if she can. Pt voiced understanding

## 2023-09-14 ENCOUNTER — TELEPHONE (OUTPATIENT)
Dept: OBSTETRICS AND GYNECOLOGY | Facility: CLINIC | Age: 28
End: 2023-09-14
Payer: COMMERCIAL

## 2023-09-14 NOTE — TELEPHONE ENCOUNTER
Pt called stating she is still having issues with n/v. Pt states she is still trying to use the Zofran sparingly so she does still have some left from the ER. Pt advised to eat small frequent snack and try to stick with more bland foods. Pt advised she can try peppermint and selam. Pt also advised on OTC Emetrol and Unisom and B6. Pt voiced understanding and states she will try these things.

## 2023-09-19 ENCOUNTER — TELEPHONE (OUTPATIENT)
Dept: OBSTETRICS AND GYNECOLOGY | Facility: CLINIC | Age: 28
End: 2023-09-19
Payer: COMMERCIAL

## 2023-09-19 NOTE — TELEPHONE ENCOUNTER
Pt called to ask what allergy medication she can take that is safe for pregnancy. Pt states she has Diphenhydramine. Pt advised that is Benadryl and that is safe for pregnancy. Pt voiced understanding

## 2023-09-27 ENCOUNTER — ROUTINE PRENATAL (OUTPATIENT)
Dept: OBSTETRICS AND GYNECOLOGY | Facility: CLINIC | Age: 28
End: 2023-09-27
Payer: COMMERCIAL

## 2023-09-27 VITALS — DIASTOLIC BLOOD PRESSURE: 68 MMHG | BODY MASS INDEX: 22.57 KG/M2 | WEIGHT: 123.4 LBS | SYSTOLIC BLOOD PRESSURE: 114 MMHG

## 2023-09-27 DIAGNOSIS — Z3A.10 10 WEEKS GESTATION OF PREGNANCY: ICD-10-CM

## 2023-09-27 DIAGNOSIS — Z64.1 MULTIGRAVIDA: Primary | ICD-10-CM

## 2023-09-27 DIAGNOSIS — O21.9 NAUSEA AND VOMITING DURING PREGNANCY: ICD-10-CM

## 2023-09-27 LAB
GLUCOSE UR STRIP-MCNC: NEGATIVE MG/DL
PROT UR STRIP-MCNC: ABNORMAL MG/DL

## 2023-09-27 RX ORDER — ONDANSETRON 4 MG/1
4 TABLET, ORALLY DISINTEGRATING ORAL EVERY 8 HOURS PRN
Qty: 45 TABLET | Refills: 1 | Status: SHIPPED | OUTPATIENT
Start: 2023-09-27

## 2023-09-27 RX ORDER — DOXYLAMINE SUCCINATE AND PYRIDOXINE HYDROCHLORIDE, DELAYED RELEASE TABLETS 10 MG/10 MG 10; 10 MG/1; MG/1
2 TABLET, DELAYED RELEASE ORAL NIGHTLY PRN
Qty: 30 TABLET | Refills: 0 | Status: SHIPPED | OUTPATIENT
Start: 2023-09-27

## 2023-09-27 NOTE — PROGRESS NOTES
Initially was going to see Dr. Lei. Dr. Lei has encouraged her to stick with us. Nauseated. Zofran from ED helped but is now out. Needs refill. Tolerating PO liquids. Denies regular contractions, leakage of fluid, vaginal bleeding or discharge. Notes lower back pain but thinks part of it is from her scoliosis.     Urine today:  Protein: Trace  Glucose: Negative    Prenatal labs today.   NIPT today, no reveal  Zofran refilled  Back pain expectations and options at home to try reviewed  RTC 2 weeks for recheck.     Diagnoses and all orders for this visit:    1. Multigravida (Primary)  -     Chlamydia trachomatis, Neisseria gonorrhoeae, PCR w/ confirmation - Urine, Urine, Clean Catch  -     ABO / Rh  -     AMB Referral to [facility] (ONLY [place] Medicaid)  -     Ambulatory Referral to [facility]/Perinatology  -     Antibody Screen  -     CBC & Differential  -     Hemoglobinopathy Fractionation Cascade  -     Hepatitis B Surface Antigen  -     Varicella Zoster Antibody, IgG  -     Urine Culture - Urine, Urine, Clean Catch  -     ToxASSURE Select 13 (MW) - Urine, Clean Catch  -     Rubella Antibody, IgG  -     RPR  -     HIV-1 / O / 2 Ag / Antibody  -     Hepatitis C Antibody    2. 10 weeks gestation of pregnancy  -     POC Urinalysis Dipstick  -     [facility] Panorama Prenatal Test: Chromosomes 13, 18, 21, X & Y: Triploidy 22Q.11.2 Deletion - Blood,    3. Nausea and vomiting during pregnancy  -     ondansetron ODT (ZOFRAN-ODT) 4 MG disintegrating tablet; Place 1 tablet on the tongue Every 8 (Eight) Hours As Needed for Nausea or Vomiting.  Dispense: 45 tablet; Refill: 1    Other orders  -     doxylamine-pyridoxine (DICLEGIS) 10-10 MG tablet delayed-release EC tablet; Take 2 tablets by mouth At Night As Needed (nausea/vomiting).  Dispense: 30 tablet; Refill: 0

## 2023-09-28 ENCOUNTER — TELEPHONE (OUTPATIENT)
Dept: OBSTETRICS AND GYNECOLOGY | Facility: CLINIC | Age: 28
End: 2023-09-28
Payer: COMMERCIAL

## 2023-09-28 NOTE — TELEPHONE ENCOUNTER
PT IS 10 WEEKS PREGNANT WANTING TO KNOW IF ITS OK FOR HER TO GO TO THE [place] THIS WEEKEND- PLEASE ADVISE -[phone]- FINE WITH CALLBACK ANYTIME, FINE WITH LVM.

## 2023-09-28 NOTE — TELEPHONE ENCOUNTER
Patient called on call provider  She plans to go to a haunted house over the weekend and is 10 weeks pregnant    She is advise to avoid any carnival type rides and avoid running in a way that could lead to a fall or blow to the abdomen.   Otherwise no need to avoid haunted houses.

## 2023-10-03 LAB
ABO GROUP BLD: NORMAL
BACTERIA UR CULT: NORMAL
BACTERIA UR CULT: NORMAL
BASOPHILS # BLD AUTO: 0.05 10*3/MM3 (ref 0–0.2)
BASOPHILS NFR BLD AUTO: 0.5 % (ref 0–1.5)
BLD GP AB SCN SERPL QL: NEGATIVE
C TRACH RRNA SPEC QL NAA+PROBE: NEGATIVE
DRUGS UR: NORMAL
EOSINOPHIL # BLD AUTO: 0.04 10*3/MM3 (ref 0–0.4)
EOSINOPHIL NFR BLD AUTO: 0.4 % (ref 0.3–6.2)
ERYTHROCYTE [DISTWIDTH] IN BLOOD BY AUTOMATED COUNT: 12.8 % (ref 12.3–15.4)
HBV SURFACE AG SERPL QL IA: NEGATIVE
HCT VFR BLD AUTO: 41 % (ref 34–46.6)
HCV IGG SERPL QL IA: NON REACTIVE
HGB A MFR BLD ELPH: 97.6 % (ref 96.4–98.8)
HGB A2 MFR BLD ELPH: 2.4 % (ref 1.8–3.2)
HGB BLD-MCNC: 13.7 G/DL (ref 12–15.9)
HGB F MFR BLD ELPH: 0 % (ref 0–2)
HGB FRACT BLD-IMP: NORMAL
HGB S MFR BLD ELPH: 0 %
HIV 1+2 AB+HIV1 P24 AG SERPL QL IA: NON REACTIVE
IMM GRANULOCYTES # BLD AUTO: 0.04 10*3/MM3 (ref 0–0.05)
IMM GRANULOCYTES NFR BLD AUTO: 0.4 % (ref 0–0.5)
LYMPHOCYTES # BLD AUTO: 2.3 10*3/MM3 (ref 0.7–3.1)
LYMPHOCYTES NFR BLD AUTO: 21.7 % (ref 19.6–45.3)
MCH RBC QN AUTO: 31.6 PG (ref 26.6–33)
MCHC RBC AUTO-ENTMCNC: 33.4 G/DL (ref 31.5–35.7)
MCV RBC AUTO: 94.5 FL (ref 79–97)
MONOCYTES # BLD AUTO: 0.89 10*3/MM3 (ref 0.1–0.9)
MONOCYTES NFR BLD AUTO: 8.4 % (ref 5–12)
N GONORRHOEA RRNA SPEC QL NAA+PROBE: NEGATIVE
NEUTROPHILS # BLD AUTO: 7.3 10*3/MM3 (ref 1.7–7)
NEUTROPHILS NFR BLD AUTO: 68.6 % (ref 42.7–76)
NRBC BLD AUTO-RTO: 0 /100 WBC (ref 0–0.2)
PLATELET # BLD AUTO: 271 10*3/MM3 (ref 140–450)
RBC # BLD AUTO: 4.34 10*6/MM3 (ref 3.77–5.28)
RH BLD: NEGATIVE
RPR SER QL: NON REACTIVE
RUBV IGG SERPL IA-ACNC: 1.1 INDEX
VZV IGG SER IA-ACNC: <135 INDEX
WBC # BLD AUTO: 10.62 10*3/MM3 (ref 3.4–10.8)

## 2023-10-13 ENCOUNTER — TELEPHONE (OUTPATIENT)
Dept: OBSTETRICS AND GYNECOLOGY | Facility: CLINIC | Age: 28
End: 2023-10-13

## 2023-10-13 NOTE — TELEPHONE ENCOUNTER
julissa     Caller: Dianna Damon    Relationship to patient: Self    Best call back number: [phone] (home)        Type of visit: OB F/U    Requested date: WHEN AVAILABLE     If rescheduling, when is the original appointment: 10-@1:45    Additional notes: UNABLE TO WT CALL

## 2023-10-16 ENCOUNTER — ROUTINE PRENATAL (OUTPATIENT)
Dept: OBSTETRICS AND GYNECOLOGY | Facility: CLINIC | Age: 28
End: 2023-10-16
Payer: COMMERCIAL

## 2023-10-16 VITALS — SYSTOLIC BLOOD PRESSURE: 102 MMHG | BODY MASS INDEX: 22.86 KG/M2 | WEIGHT: 125 LBS | DIASTOLIC BLOOD PRESSURE: 68 MMHG

## 2023-10-16 DIAGNOSIS — Z3A.12 12 WEEKS GESTATION OF PREGNANCY: Primary | ICD-10-CM

## 2023-10-16 LAB
GLUCOSE UR STRIP-MCNC: NEGATIVE MG/DL
NITRITE UR-MCNC: POSITIVE MG/ML
PROT UR STRIP-MCNC: NEGATIVE MG/DL

## 2023-10-16 NOTE — PROGRESS NOTES
Reports nausea is now resolved.  She's able to eat and has gained 3 lbs since last visit.  Denies other complaint.

## 2023-10-18 LAB
BACTERIA UR CULT: NORMAL
BACTERIA UR CULT: NORMAL

## 2023-10-24 ENCOUNTER — PATIENT OUTREACH (OUTPATIENT)
Dept: LABOR AND DELIVERY | Facility: HOSPITAL | Age: 28
End: 2023-10-24
Payer: COMMERCIAL

## 2023-10-24 NOTE — OUTREACH NOTE
[facility]  Unable to Reach       Questions/Answers      Flowsheet Row Responses   Pending Outreach Confirm Patient Interest   Call Attempt First   Outcome No answer/busy, MyChart message sent to patient                Kassi Parikh RN  Maternity Nurse Navigator    10/24/2023, 14:54 CDT

## 2023-10-26 ENCOUNTER — TELEPHONE (OUTPATIENT)
Dept: OBSTETRICS AND GYNECOLOGY | Facility: CLINIC | Age: 28
End: 2023-10-26
Payer: COMMERCIAL

## 2023-10-26 NOTE — TELEPHONE ENCOUNTER
Pt called regarding a possibility of traveling by plane in December. Pt wanting to know if this would be ok. Pt advised to speak with Nathalia regarding this subject at her next appt on 11/14/23. Pt voiced understanding

## 2023-10-27 ENCOUNTER — PATIENT OUTREACH (OUTPATIENT)
Dept: LABOR AND DELIVERY | Facility: HOSPITAL | Age: 28
End: 2023-10-27
Payer: COMMERCIAL

## 2023-10-27 NOTE — OUTREACH NOTE
[facility]  Unable to Reach       Questions/Answers      Flowsheet Row Responses   Pending Outreach Confirm Patient Interest   Call Attempt Second   Outcome No answer/busy                Kassi Parikh RN  Maternity Nurse Navigator    10/27/2023, 12:53 CDT

## 2023-11-01 ENCOUNTER — PATIENT OUTREACH (OUTPATIENT)
Dept: LABOR AND DELIVERY | Facility: HOSPITAL | Age: 28
End: 2023-11-01
Payer: COMMERCIAL

## 2023-11-01 NOTE — OUTREACH NOTE
[facility]  Enrollment    Current Estimated Gestational Age: 15w1d    Questions/Answers      Flowsheet Row Responses   Would like to participate? Yes   Date of Intake Visit 11/01/23            [facility]  Intake    Current Estimated Gestational Age: 15w1d    Intake Assessment      Flowsheet Row Responses   Best Method for Contacting Cell   Currently Employed No   Able to keep appointments as scheduled Yes   Do you have a dentist? No  [dental clinic assistance sent in resource letter]   Resources Presently Utilizing: WIC (Women, Infant, Children)   Maternal Warning Signs Provided  [attached to resource letter]   Other: Provided  [in AVS and resource letter]   Other Education HANDS, How to find a dentist, How to find a pediatrician, How to find a primary care provider, Meds to Beds, Mental Health Services, Smoking/Vaping Cessation, Substance Use Disorder Treatment, SNAP Benefits, Transportation Assistance, Insurance benefits/Incentives, WIC Benefits            Learning Assessment      Flowsheet Row Responses   Relationship Patient   Does the learner have any barriers to learning? No Barriers   What is the preferred language of the learner for medical teaching? English   How does the learner prefer to learn new concepts? Reading, Listening, Demonstration, Pictures/Video            SDOH updated and reviewed with the patient during this program:  Financial Resource Strain: Low Risk  (11/1/2023)    Overall Financial Resource Strain (CARDIA)     Difficulty of Paying Living Expenses: Not very hard      Physical Activity: Insufficiently Active (11/1/2023)    Exercise Vital Sign     Days of Exercise per Week: 7 days     Minutes of Exercise per Session: 20 min      Food Insecurity: No Food Insecurity (11/1/2023)    Hunger Vital Sign     Worried About Running Out of Food in the Last Year: Never true     Ran Out of Food in the Last Year: Never true      Social Connections: Not At Risk (11/1/2023)    Family  and Community Support     Help with Day-to-Day Activities: I don't need any help     Lonely or Isolated: Rarely      Transportation Needs: No Transportation Needs (11/1/2023)    PRAPARE - Transportation     Lack of Transportation (Medical): No     Lack of Transportation (Non-Medical): No      Housing Stability: Not At Risk (11/1/2023)    Housing Stability     Current Living Arrangements: home     Potentially Unsafe Housing Conditions: none      Stress: Not on file       Resource letter and prenatal education sent to client in [facility].     Referral submitted to the following resources (verbal consent received to submit demographic information):     HANDS    Tobacco, Alcohol, and Drug History     reports that she has never smoked. She has never used smokeless tobacco.   reports that she does not currently use alcohol after a past usage of about 1.0 standard drink of alcohol per week.   reports no history of drug use.    Kassi Parikh RN  Maternity Nurse Navigator    11/1/2023, 14:31 CDT            Kassi Parikh RN  Maternity Nurse Navigator    11/1/2023, 14:31 CDT

## 2023-11-12 ENCOUNTER — APPOINTMENT (OUTPATIENT)
Dept: GENERAL RADIOLOGY | Facility: HOSPITAL | Age: 28
End: 2023-11-12
Payer: COMMERCIAL

## 2023-11-12 ENCOUNTER — HOSPITAL ENCOUNTER (EMERGENCY)
Facility: HOSPITAL | Age: 28
Discharge: HOME | End: 2023-11-12
Attending: EMERGENCY MEDICINE | Admitting: EMERGENCY MEDICINE
Payer: COMMERCIAL

## 2023-11-12 VITALS
OXYGEN SATURATION: 100 % | WEIGHT: 128 LBS | BODY MASS INDEX: 23.55 KG/M2 | TEMPERATURE: 98.4 F | HEART RATE: 83 BPM | RESPIRATION RATE: 16 BRPM | DIASTOLIC BLOOD PRESSURE: 62 MMHG | HEIGHT: 62 IN | SYSTOLIC BLOOD PRESSURE: 101 MMHG

## 2023-11-12 DIAGNOSIS — Z33.1 IUP (INTRAUTERINE PREGNANCY), INCIDENTAL: Primary | ICD-10-CM

## 2023-11-12 DIAGNOSIS — N39.0 ACUTE UTI: ICD-10-CM

## 2023-11-12 DIAGNOSIS — B34.9 VIRAL SYNDROME: ICD-10-CM

## 2023-11-12 LAB
ALBUMIN SERPL-MCNC: 3.6 G/DL (ref 3.5–5.2)
ALBUMIN/GLOB SERPL: 1.2 G/DL
ALP SERPL-CCNC: 59 U/L (ref 39–117)
ALT SERPL W P-5'-P-CCNC: 7 U/L (ref 1–33)
AMORPH URATE CRY URNS QL MICRO: ABNORMAL /HPF
ANION GAP SERPL CALCULATED.3IONS-SCNC: 9 MMOL/L (ref 5–15)
AST SERPL-CCNC: 13 U/L (ref 1–32)
BACTERIA UR QL AUTO: ABNORMAL /HPF
BASOPHILS # BLD AUTO: 0.04 10*3/MM3 (ref 0–0.2)
BASOPHILS NFR BLD AUTO: 0.4 % (ref 0–1.5)
BILIRUB SERPL-MCNC: 0.3 MG/DL (ref 0–1.2)
BILIRUB UR QL STRIP: NEGATIVE
BUN SERPL-MCNC: 6 MG/DL (ref 6–20)
BUN/CREAT SERPL: 19.4 (ref 7–25)
CALCIUM SPEC-SCNC: 8.7 MG/DL (ref 8.6–10.5)
CHLORIDE SERPL-SCNC: 104 MMOL/L (ref 98–107)
CLARITY UR: ABNORMAL
CO2 SERPL-SCNC: 23 MMOL/L (ref 22–29)
COLOR UR: YELLOW
CREAT SERPL-MCNC: 0.31 MG/DL (ref 0.57–1)
DEPRECATED RDW RBC AUTO: 44.9 FL (ref 37–54)
EGFRCR SERPLBLD CKD-EPI 2021: 148.1 ML/MIN/1.73
EOSINOPHIL # BLD AUTO: 0.09 10*3/MM3 (ref 0–0.4)
EOSINOPHIL NFR BLD AUTO: 1 % (ref 0.3–6.2)
ERYTHROCYTE [DISTWIDTH] IN BLOOD BY AUTOMATED COUNT: 13.3 % (ref 12.3–15.4)
FLUAV RNA RESP QL NAA+PROBE: NOT DETECTED
FLUBV RNA RESP QL NAA+PROBE: NOT DETECTED
GLOBULIN UR ELPH-MCNC: 3 GM/DL
GLUCOSE SERPL-MCNC: 88 MG/DL (ref 65–99)
GLUCOSE UR STRIP-MCNC: NEGATIVE MG/DL
HCT VFR BLD AUTO: 33.8 % (ref 34–46.6)
HGB BLD-MCNC: 11.2 G/DL (ref 12–15.9)
HGB UR QL STRIP.AUTO: NEGATIVE
HYALINE CASTS UR QL AUTO: ABNORMAL /LPF
IMM GRANULOCYTES # BLD AUTO: 0.05 10*3/MM3 (ref 0–0.05)
IMM GRANULOCYTES NFR BLD AUTO: 0.5 % (ref 0–0.5)
KETONES UR QL STRIP: NEGATIVE
LEUKOCYTE ESTERASE UR QL STRIP.AUTO: ABNORMAL
LIPASE SERPL-CCNC: 20 U/L (ref 13–60)
LYMPHOCYTES # BLD AUTO: 1.5 10*3/MM3 (ref 0.7–3.1)
LYMPHOCYTES NFR BLD AUTO: 16.3 % (ref 19.6–45.3)
MCH RBC QN AUTO: 30.8 PG (ref 26.6–33)
MCHC RBC AUTO-ENTMCNC: 33.1 G/DL (ref 31.5–35.7)
MCV RBC AUTO: 92.9 FL (ref 79–97)
MONOCYTES # BLD AUTO: 0.63 10*3/MM3 (ref 0.1–0.9)
MONOCYTES NFR BLD AUTO: 6.8 % (ref 5–12)
NEUTROPHILS NFR BLD AUTO: 6.91 10*3/MM3 (ref 1.7–7)
NEUTROPHILS NFR BLD AUTO: 75 % (ref 42.7–76)
NITRITE UR QL STRIP: NEGATIVE
NRBC BLD AUTO-RTO: 0 /100 WBC (ref 0–0.2)
PH UR STRIP.AUTO: 7 [PH] (ref 5–8)
PLATELET # BLD AUTO: 258 10*3/MM3 (ref 140–450)
PMV BLD AUTO: 9.5 FL (ref 6–12)
POTASSIUM SERPL-SCNC: 3.8 MMOL/L (ref 3.5–5.2)
PROT SERPL-MCNC: 6.6 G/DL (ref 6–8.5)
PROT UR QL STRIP: NEGATIVE
RBC # BLD AUTO: 3.64 10*6/MM3 (ref 3.77–5.28)
RBC # UR STRIP: ABNORMAL /HPF
REF LAB TEST METHOD: ABNORMAL
SARS-COV-2 RNA RESP QL NAA+PROBE: NOT DETECTED
SODIUM SERPL-SCNC: 136 MMOL/L (ref 136–145)
SP GR UR STRIP: 1.02 (ref 1–1.03)
SQUAMOUS #/AREA URNS HPF: ABNORMAL /HPF
UROBILINOGEN UR QL STRIP: ABNORMAL
WBC # UR STRIP: ABNORMAL /HPF
WBC NRBC COR # BLD: 9.22 10*3/MM3 (ref 3.4–10.8)

## 2023-11-12 PROCEDURE — 87086 URINE CULTURE/COLONY COUNT: CPT | Performed by: EMERGENCY MEDICINE

## 2023-11-12 PROCEDURE — 25810000003 SODIUM CHLORIDE 0.9 % SOLUTION: Performed by: EMERGENCY MEDICINE

## 2023-11-12 PROCEDURE — 25010000002 ONDANSETRON PER 1 MG: Performed by: EMERGENCY MEDICINE

## 2023-11-12 PROCEDURE — 85025 COMPLETE CBC W/AUTO DIFF WBC: CPT | Performed by: EMERGENCY MEDICINE

## 2023-11-12 PROCEDURE — 96374 THER/PROPH/DIAG INJ IV PUSH: CPT

## 2023-11-12 PROCEDURE — 99283 EMERGENCY DEPT VISIT LOW MDM: CPT

## 2023-11-12 PROCEDURE — 80053 COMPREHEN METABOLIC PANEL: CPT | Performed by: EMERGENCY MEDICINE

## 2023-11-12 PROCEDURE — 83690 ASSAY OF LIPASE: CPT | Performed by: EMERGENCY MEDICINE

## 2023-11-12 PROCEDURE — 81001 URINALYSIS AUTO W/SCOPE: CPT | Performed by: EMERGENCY MEDICINE

## 2023-11-12 PROCEDURE — 87636 SARSCOV2 & INF A&B AMP PRB: CPT | Performed by: EMERGENCY MEDICINE

## 2023-11-12 RX ORDER — ONDANSETRON 2 MG/ML
4 INJECTION INTRAMUSCULAR; INTRAVENOUS ONCE
Status: COMPLETED | OUTPATIENT
Start: 2023-11-12 | End: 2023-11-12

## 2023-11-12 RX ORDER — ONDANSETRON 4 MG/1
4 TABLET, ORALLY DISINTEGRATING ORAL EVERY 8 HOURS PRN
Qty: 12 TABLET | Refills: 0 | Status: SHIPPED | OUTPATIENT
Start: 2023-11-12

## 2023-11-12 RX ORDER — NITROFURANTOIN 25; 75 MG/1; MG/1
100 CAPSULE ORAL 2 TIMES DAILY
Qty: 14 CAPSULE | Refills: 0 | Status: SHIPPED | OUTPATIENT
Start: 2023-11-12

## 2023-11-12 RX ORDER — SODIUM CHLORIDE 0.9 % (FLUSH) 0.9 %
10 SYRINGE (ML) INJECTION AS NEEDED
Status: DISCONTINUED | OUTPATIENT
Start: 2023-11-12 | End: 2023-11-12 | Stop reason: HOSPADM

## 2023-11-12 RX ADMIN — SODIUM CHLORIDE 500 ML: 9 INJECTION, SOLUTION INTRAVENOUS at 15:42

## 2023-11-12 RX ADMIN — ONDANSETRON 4 MG: 2 INJECTION INTRAMUSCULAR; INTRAVENOUS at 15:42

## 2023-11-12 NOTE — Clinical Note
[facility] EMERGENCY DEPARTMENT  [address]  [facility] [address]  Phone: [phone]    Dianna Damon was seen and treated in our emergency department on 11/12/2023.  She may return to work on 11/14/2023.         Thank you for choosing [facility].    Irlanda Bradley MD

## 2023-11-12 NOTE — ED PROVIDER NOTES
Subjective   History of Present Illness  Patient is a 28-year-old female with no significant past medical history who presents to the ER with multiple complaints.  Patient is currently 16 weeks and 5 days pregnant.  She states that she has had intermittent nausea and vomiting over the last week and then developed diarrhea today.  She has also had some mild stretching type pain in her suprapubic region but denies any vaginal bleeding.  She has also had a productive cough for the last week.  She denies any fever, chest pain, shortness of air, urinary changes, neurologic changes.  She denies any recent trauma.      Review of Systems   Constitutional: Negative.    HENT: Negative.     Eyes: Negative.    Respiratory:  Positive for cough.    Cardiovascular: Negative.    Gastrointestinal:  Positive for abdominal pain, diarrhea, nausea and vomiting.   Endocrine: Negative.    Genitourinary: Negative.    Musculoskeletal: Negative.    Skin: Negative.    Allergic/Immunologic: Negative.    Neurological: Negative.    Hematological: Negative.    Psychiatric/Behavioral: Negative.     All other systems reviewed and are negative.      Past Medical History:   Diagnosis Date    Depression 07/01/2023    Gonorrhea     + on 4/16/2018... - on 5/17/2018    Herpes     HSV1- valtrex prescribed    Recurrent pregnancy loss, antepartum condition or complication 10/08/2020    Urinary tract infection     Urogenital trichomoniasis     + on 3/29/2018 - on 5/17/2018       No Known Allergies    Past Surgical History:   Procedure Laterality Date    DENTAL PROCEDURE      WISDOM TOOTH EXTRACTION         Family History   Problem Relation Age of Onset    Diabetes Maternal Grandmother     Diabetes Maternal Grandfather     Breast cancer Neg Hx     Ovarian cancer Neg Hx     Uterine cancer Neg Hx     Colon cancer Neg Hx        Social History     Socioeconomic History    Marital status: Single   Tobacco Use    Smoking status: Never    Smokeless tobacco: Never    Vaping Use    Vaping Use: Never used   Substance and Sexual Activity    Alcohol use: Not Currently     Alcohol/week: 1.0 standard drink of alcohol     Types: 1 Drinks containing 0.5 oz of alcohol per week    Drug use: No    Sexual activity: Yes     Partners: Male     Birth control/protection: None     Comment: 2 weeks           Objective   Physical Exam  Vitals and nursing note reviewed.   Constitutional:       Appearance: She is well-developed.   HENT:      Head: Normocephalic and atraumatic.   Eyes:      Conjunctiva/sclera: Conjunctivae normal.      Pupils: Pupils are equal, round, and reactive to light.   Cardiovascular:      Rate and Rhythm: Normal rate and regular rhythm.      Heart sounds: Normal heart sounds.   Pulmonary:      Effort: Pulmonary effort is normal.      Breath sounds: Normal breath sounds.   Abdominal:      Palpations: Abdomen is soft.      Tenderness: There is no abdominal tenderness. There is no right CVA tenderness, left CVA tenderness, guarding or rebound.   Musculoskeletal:         General: No deformity. Normal range of motion.      Cervical back: Normal range of motion.   Skin:     General: Skin is warm.   Neurological:      Mental Status: She is alert and oriented to person, place, and time.   Psychiatric:         Behavior: Behavior normal.         Procedures           ED Course        Lab Results (last 24 hours)       Procedure Component Value Units Date/Time    Urinalysis With Culture If Indicated - Urine, Clean Catch [[id number]]  (Abnormal) Collected: 11/12/23 1536    Specimen: Urine, Clean Catch Updated: 11/12/23 1604     Color, UA Yellow     Appearance, UA Turbid     pH, UA 7.0     Specific Gravity, UA 1.022     Glucose, UA Negative     Ketones, UA Negative     Bilirubin, UA Negative     Blood, UA Negative     Protein, UA Negative     Leuk Esterase, UA Moderate (2+)     Nitrite, UA Negative     Urobilinogen, UA 2.0 E.U./dL    Narrative:      In absence of clinical symptoms, the  presence of pyuria, bacteria, and/or nitrites on the urinalysis result does not correlate with infection.    Urinalysis, Microscopic Only - Urine, Clean Catch [[id number]]  (Abnormal) Collected: 11/12/23 1536    Specimen: Urine, Clean Catch Updated: 11/12/23 1604     RBC, UA 0-2 /HPF      WBC, UA 6-10 /HPF      Bacteria, UA 2+ /HPF      Squamous Epithelial Cells, UA 0-2 /HPF      Hyaline Casts, UA 0-2 /LPF      Amorphous Crystals, UA Moderate/2+ /HPF      Methodology Manual Light Microscopy    Urine Culture - Urine, Urine, Clean Catch [[id number]] Collected: 11/12/23 1536    Specimen: Urine, Clean Catch Updated: 11/12/23 1604    COVID-19 and FLU A/B PCR, 1 HR TAT - Swab, Nasopharynx [[id number]]  (Normal) Collected: 11/12/23 1537    Specimen: Swab from Nasopharynx Updated: 11/12/23 1617     COVID19 Not Detected     Influenza A PCR Not Detected     Influenza B PCR Not Detected    Narrative:      Fact sheet for providers: https://www.fda.gov/media/293132/download    Fact sheet for patients: https://www.fda.gov/media/563047/download    Test performed by PCR.    CBC & Differential [[id number]]  (Abnormal) Collected: 11/12/23 1541    Specimen: Blood Updated: 11/12/23 1554    Narrative:      The following orders were created for panel order CBC & Differential.  Procedure                               Abnormality         Status                     ---------                               -----------         ------                     CBC Auto Differential[[id number]]        Abnormal            Final result                 Please view results for these tests on the individual orders.    Comprehensive Metabolic Panel [[id number]]  (Abnormal) Collected: 11/12/23 1541    Specimen: Blood Updated: 11/12/23 1611     Glucose 88 mg/dL      BUN 6 mg/dL      Creatinine 0.31 mg/dL      Sodium 136 mmol/L      Potassium 3.8 mmol/L      Chloride 104 mmol/L      CO2 23.0 mmol/L      Calcium 8.7 mg/dL      Total Protein 6.6 g/dL      Albumin 3.6  g/dL      ALT (SGPT) 7 U/L      AST (SGOT) 13 U/L      Alkaline Phosphatase 59 U/L      Total Bilirubin 0.3 mg/dL      Globulin 3.0 gm/dL      A/G Ratio 1.2 g/dL      BUN/Creatinine Ratio 19.4     Anion Gap 9.0 mmol/L      eGFR 148.1 mL/min/1.73     Narrative:      GFR Normal >60  Chronic Kidney Disease <60  Kidney Failure <15      Lipase [[id number]]  (Normal) Collected: 11/12/23 1541    Specimen: Blood Updated: 11/12/23 1606     Lipase 20 U/L     CBC Auto Differential [[id number]]  (Abnormal) Collected: 11/12/23 1541    Specimen: Blood Updated: 11/12/23 1554     WBC 9.22 10*3/mm3      RBC 3.64 10*6/mm3      Hemoglobin 11.2 g/dL      Hematocrit 33.8 %      MCV 92.9 fL      MCH 30.8 pg      MCHC 33.1 g/dL      RDW 13.3 %      RDW-SD 44.9 fl      MPV 9.5 fL      Platelets 258 10*3/mm3      Neutrophil % 75.0 %      Lymphocyte % 16.3 %      Monocyte % 6.8 %      Eosinophil % 1.0 %      Basophil % 0.4 %      Immature Grans % 0.5 %      Neutrophils, Absolute 6.91 10*3/mm3      Lymphocytes, Absolute 1.50 10*3/mm3      Monocytes, Absolute 0.63 10*3/mm3      Eosinophils, Absolute 0.09 10*3/mm3      Basophils, Absolute 0.04 10*3/mm3      Immature Grans, Absolute 0.05 10*3/mm3      nRBC 0.0 /100 WBC                                          Medical Decision Making  Patient is a 28-year-old female with no significant past medical history who presents to the ER with multiple complaints.  Patient is currently 16 weeks and 5 days pregnant.  She states that she has had intermittent nausea and vomiting over the last week and then developed diarrhea today.  She has also had some mild stretching type pain in her suprapubic region but denies any vaginal bleeding.  She has also had a productive cough for the last week.  She denies any fever, chest pain, shortness of air, urinary changes, neurologic changes.  She denies any recent trauma.    Differential diagnosis: Viral syndrome, pneumonia, intra-abdominal infection, UTI    Bedside  ultrasound was performed and showed an intrauterine pregnancy with a good heart rate and with good movement.  Labs showed a UTI and anemia.  Patient did not want to chest x-ray or any imaging while here in the ER.  COVID and flu swabs were negative.  Work-up consistent with a viable IUP along with a viral syndrome and UTI.  Patient will be discharged home with Zofran and Macrobid to follow-up with her PCP and obstetrician.  She is to return for any worsening or new symptoms, fever, shortness of air, vaginal bleeding or other concerns.  Patient agreeable to plan.         Problems Addressed:  Acute UTI: complicated acute illness or injury  IUP (intrauterine pregnancy), incidental: complicated acute illness or injury  Viral syndrome: complicated acute illness or injury    Amount and/or Complexity of Data Reviewed  Labs: ordered. Decision-making details documented in ED Course.  Radiology: ordered and independent interpretation performed.    Risk  Prescription drug management.        Final diagnoses:   IUP (intrauterine pregnancy), incidental   Acute UTI   Viral syndrome       ED Disposition  ED Disposition       ED Disposition   Discharge    Condition   Good    Comment   --               Provider, No Known  [facility] [address]  [phone]    Schedule an appointment as soon as possible for a visit            Medication List        New Prescriptions      nitrofurantoin (macrocrystal-monohydrate) 100 MG capsule  Commonly known as: MACROBID  Take 1 capsule by mouth 2 (Two) Times a Day.            Changed      * ondansetron ODT 4 MG disintegrating tablet  Commonly known as: ZOFRAN-ODT  Place 1 tablet on the tongue Every 8 (Eight) Hours As Needed for Nausea or Vomiting.  What changed: Another medication with the same name was added. Make sure you understand how and when to take each.     * ondansetron ODT 4 MG disintegrating tablet  Commonly known as: ZOFRAN-ODT  Place 1 tablet on the tongue Every 8  (Eight) Hours As Needed for Nausea or Vomiting.  What changed: You were already taking a medication with the same name, and this prescription was added. Make sure you understand how and when to take each.           * This list has 2 medication(s) that are the same as other medications prescribed for you. Read the directions carefully, and ask your doctor or other care provider to review them with you.                   Where to Get Your Medications        These medications were sent to [facility] [address] [facility] [phone] [facility] [phone] [address] [address]      Phone: [phone]   nitrofurantoin (macrocrystal-monohydrate) 100 MG capsule  ondansetron ODT 4 MG disintegrating tablet            Irlanda Bradley MD  11/12/23 2551

## 2023-11-14 ENCOUNTER — ROUTINE PRENATAL (OUTPATIENT)
Dept: OBSTETRICS AND GYNECOLOGY | Facility: CLINIC | Age: 28
End: 2023-11-14
Payer: COMMERCIAL

## 2023-11-14 VITALS — DIASTOLIC BLOOD PRESSURE: 62 MMHG | BODY MASS INDEX: 23.05 KG/M2 | WEIGHT: 126 LBS | SYSTOLIC BLOOD PRESSURE: 108 MMHG

## 2023-11-14 DIAGNOSIS — Z71.85 IMMUNIZATION COUNSELING: ICD-10-CM

## 2023-11-14 DIAGNOSIS — Z3A.17 17 WEEKS GESTATION OF PREGNANCY: Primary | ICD-10-CM

## 2023-11-14 DIAGNOSIS — Z34.82 MULTIGRAVIDA IN SECOND TRIMESTER: ICD-10-CM

## 2023-11-14 LAB
BACTERIA SPEC AEROBE CULT: NORMAL
GLUCOSE UR STRIP-MCNC: NEGATIVE MG/DL
PROT UR STRIP-MCNC: NEGATIVE MG/DL

## 2023-11-14 RX ORDER — AZITHROMYCIN 250 MG/1
TABLET, FILM COATED ORAL
COMMUNITY
Start: 2023-11-06

## 2023-11-14 RX ORDER — LORATADINE 10 MG/1
TABLET ORAL
COMMUNITY
Start: 2023-11-06

## 2023-11-14 NOTE — PROGRESS NOTES
Reason for visit: Routine OB visit at 17w0d. ELIZABETH 2024, by Ultrasound    CC:  Patient is recovering from upper respiratory congestion. She feels better compared to early pregnancy. She is feeling light fetal movement. Denies VB, LOF, pelvic pain, or cramping.     ROS: All systems reviewed and are negative with exception of the following: cough, nausea    Wt 126 lb for a TWG of -2.747 kg (-6 lb 0.9 oz), /62, FHTs 149  Urine today and reviewed: negative glucose, negative protein    Anatomy Scan: scheduled for 2023 at 0945    Exam:  General Appearance:  Healthy appearing . Normal mood and behavior.  HEENT: NCAT, EOMI  HR str and reg. Lungs clear. Resp even and unlabored.  Abdomen is soft and nontender. No CVA tenderness. Uterus is consistent with EGA  Ext: no edema, nontender, no trauma, or cyanosis.    Impression  Diagnoses and all orders for this visit:    1. 17 weeks gestation of pregnancy (Primary)  -     POC Urinalysis Dipstick    2. Multigravida in second trimester  - Discussed second trimester of pregnancy, discomforts and measures of support, fetal movement, pelvic pain warnings, bleeding warnings, and signs and symptoms to report. Second Trimester of Pregnancy video included in the AVS. Round Ligament Pain education included in the AVS.  - Reviewed ffDNA risk screening: low risk  - Discussed and encouraged to call or come to the hospital with vaginal bleeding, leaking of fluid, pelvic pain, or any other concerns.  - Return to the office in 4 weeks with Dr. Velasquez for a routine OB visit and as needed with concerns.    3. Immunization counseling  - Discussed influenza vaccine recommendations during pregnancy. Patient declines to receive the influenza immunization today in the clinic. Influenza (Flu) Vaccine (Inactivated or Recombinant): What You Need to Know (VIS) education included in the AVS.          This note has been signed electronically.    Nathalia Clark, DNP, APRN, CNM, RNC-OB

## 2023-12-28 ENCOUNTER — ROUTINE PRENATAL (OUTPATIENT)
Dept: OBSTETRICS AND GYNECOLOGY | Facility: CLINIC | Age: 28
End: 2023-12-28
Payer: COMMERCIAL

## 2023-12-28 VITALS — SYSTOLIC BLOOD PRESSURE: 110 MMHG | BODY MASS INDEX: 23.96 KG/M2 | DIASTOLIC BLOOD PRESSURE: 62 MMHG | WEIGHT: 131 LBS

## 2023-12-28 DIAGNOSIS — Z28.21 INFLUENZA VACCINATION DECLINED: ICD-10-CM

## 2023-12-28 DIAGNOSIS — Z34.82 MULTIGRAVIDA IN SECOND TRIMESTER: ICD-10-CM

## 2023-12-28 DIAGNOSIS — Z3A.23 23 WEEKS GESTATION OF PREGNANCY: Primary | ICD-10-CM

## 2023-12-28 LAB
GLUCOSE UR STRIP-MCNC: NEGATIVE MG/DL
PROT UR STRIP-MCNC: ABNORMAL MG/DL

## 2023-12-28 RX ORDER — VITAMIN A ACETATE, BETA CAROTENE, ASCORBIC ACID, CHOLECALCIFEROL, .ALPHA.-TOCOPHEROL ACETATE, DL-, THIAMINE MONONITRATE, RIBOFLAVIN, NIACINAMIDE, PYRIDOXINE HYDROCHLORIDE, FOLIC ACID, CYANOCOBALAMIN, CALCIUM CARBONATE, FERROUS FUMARATE, ZINC OXIDE, CUPRIC OXIDE 3080; 12; 120; 400; 1; 1.84; 3; 20; 22; 920; 25; 200; 27; 10; 2 [IU]/1; UG/1; MG/1; [IU]/1; MG/1; MG/1; MG/1; MG/1; MG/1; [IU]/1; MG/1; MG/1; MG/1; MG/1; MG/1
TABLET, FILM COATED ORAL DAILY
COMMUNITY
End: 2024-01-04 | Stop reason: SDUPTHER

## 2023-12-28 NOTE — PROGRESS NOTES
Reason for visit: Routine OB visit at 23w2d     CC:  Patient reports feeling fetal movement.  She is feeling well. Denies VB, LOF, contractions, h/a, visual changes, and right upper quadrant pain.     ROS: All systems reviewed and are negative with exception of the following: Amenorrhea    Wt 131 lb for a TWG of -0.479 kg (-1 lb 0.9 oz), /62, FHTs 143, FH 23 cm  Urine today and reviewed: Negative glucose, trace protein    19-weekAnatomy scan: normal; posterior placenta without evidence of placenta previa    Exam:  General Appearance:  Healthy appearing . Normal mood and behavior.  HEENT: NCAT, EOMI  HR str and reg. Lungs clear. Resp even and unlabored.  Abdomen is soft and nontender.  No CVA tenderness.  Uterus is consistent with EGA  Ext: No edema, nontender, no trauma, or cyanosis.      Impression  Diagnoses and all orders for this visit:    1. 23 weeks gestation of pregnancy (Primary)  -     POC Urinalysis Dipstick    2. Multigravida in second trimester  Discussed second trimester of pregnancy, discomforts and measures of support, fetal movement, bleeding warnings, pelvic pain and cramping/contraction warnings, and signs and symptoms to report. Reviewed anatomy scan report. Discussed and encouraged to call or come to the hospital with vaginal bleeding, leaking of fluid, contractions, or any other concerns. Second Trimester of Pregnancy video and Round Ligament Pain education included in the AVS.     3. Influenza vaccination declined  Discussed influenza vaccine recommendations during pregnancy. Patient declines to receive the influenza immunization today in the clinic.  Discussed immune health and self-care measures to reduce risk of illness.  Encourage screening and testing if she would experience influenza-like illness symptoms for possible treatment with Tamiflu if indicated.  Influenza (Flu) Vaccine (Inactivated or Recombinant): What You Need to Know (VIS) education included in the AVS.           Return to the office in as previously scheduled for a routine prenatal visit and as needed with concerns.        This note has been signed electronically.    Nathalia Clark DNP, APRN, CNM, RNC-OB

## 2024-01-04 RX ORDER — VITAMIN A ACETATE, BETA CAROTENE, ASCORBIC ACID, CHOLECALCIFEROL, .ALPHA.-TOCOPHEROL ACETATE, DL-, THIAMINE MONONITRATE, RIBOFLAVIN, NIACINAMIDE, PYRIDOXINE HYDROCHLORIDE, FOLIC ACID, CYANOCOBALAMIN, CALCIUM CARBONATE, FERROUS FUMARATE, ZINC OXIDE, CUPRIC OXIDE 3080; 12; 120; 400; 1; 1.84; 3; 20; 22; 920; 25; 200; 27; 10; 2 [IU]/1; UG/1; MG/1; [IU]/1; MG/1; MG/1; MG/1; MG/1; MG/1; [IU]/1; MG/1; MG/1; MG/1; MG/1; MG/1
1 TABLET, FILM COATED ORAL DAILY
Qty: 90 EACH | Refills: 4 | Status: SHIPPED | OUTPATIENT
Start: 2024-01-04

## 2024-01-29 ENCOUNTER — PATIENT OUTREACH (OUTPATIENT)
Dept: LABOR AND DELIVERY | Facility: HOSPITAL | Age: 29
End: 2024-01-29
Payer: COMMERCIAL

## 2024-01-29 NOTE — OUTREACH NOTE
[facility]    EPDS questionnaire sent to client in [facility] prior to our telephone check-in call.     Kassi Parikh RN  Maternity Nurse Navigator    1/29/2024, 08:05 CST

## 2024-01-30 ENCOUNTER — ROUTINE PRENATAL (OUTPATIENT)
Dept: OBSTETRICS AND GYNECOLOGY | Age: 29
End: 2024-01-30
Payer: COMMERCIAL

## 2024-01-30 VITALS — WEIGHT: 139 LBS | DIASTOLIC BLOOD PRESSURE: 64 MMHG | BODY MASS INDEX: 25.42 KG/M2 | SYSTOLIC BLOOD PRESSURE: 110 MMHG

## 2024-01-30 DIAGNOSIS — Z67.91 RH NEGATIVE STATUS DURING PREGNANCY IN THIRD TRIMESTER: ICD-10-CM

## 2024-01-30 DIAGNOSIS — Z3A.28 28 WEEKS GESTATION OF PREGNANCY: Primary | ICD-10-CM

## 2024-01-30 DIAGNOSIS — Z13.1 SCREENING FOR DIABETES MELLITUS (DM): ICD-10-CM

## 2024-01-30 DIAGNOSIS — M54.9 BACK PAIN AFFECTING PREGNANCY IN THIRD TRIMESTER: ICD-10-CM

## 2024-01-30 DIAGNOSIS — O26.893 RH NEGATIVE STATUS DURING PREGNANCY IN THIRD TRIMESTER: ICD-10-CM

## 2024-01-30 DIAGNOSIS — O99.891 BACK PAIN AFFECTING PREGNANCY IN THIRD TRIMESTER: ICD-10-CM

## 2024-01-30 LAB
GLUCOSE UR STRIP-MCNC: NEGATIVE MG/DL
PROT UR STRIP-MCNC: NEGATIVE MG/DL

## 2024-01-30 NOTE — PROGRESS NOTES
Lower back pain in pregnancy. OTC discussed. Maternity belt rx provided to patient to go to [facility].   Endorses appropriate fetal movement. Denies regular contractions, leakage of fluid, vaginal bleeding or discharge.    Urine today:  Protein: Negative  Glucose: Negative    GTT today  Labs today  RhoGAM/Tdap today  Kick counts/Labor precautions  RTC 2 weeks    Diagnoses and all orders for this visit:    1. 28 weeks gestation of pregnancy (Primary)  -     POC Urinalysis Dipstick  -     Gestational Screen 1 Hr ([facility])  -     Hemoglobin  -     Rhogam Immune Globulin Immunization  -     Antibody Screen  -     Hepatitis B Surface Antigen  -     Hepatitis C Antibody  -     HIV-1 / O / 2 Ag / Antibody  -     RPR  -     Tdap Vaccine => 6yo IM (BOOSTRIX)    2. Rh negative status during pregnancy in third trimester  -     Rhogam Immune Globulin Immunization  -     Antibody Screen    3. Screening for diabetes mellitus (DM)  -     Gestational Screen 1 Hr ([facility])    4. Back pain affecting pregnancy in third trimester

## 2024-01-31 ENCOUNTER — PATIENT OUTREACH (OUTPATIENT)
Dept: LABOR AND DELIVERY | Facility: HOSPITAL | Age: 29
End: 2024-01-31
Payer: COMMERCIAL

## 2024-01-31 LAB
BLD GP AB SCN SERPL QL: NEGATIVE
GLUCOSE 1H P 50 G GLC PO SERPL-MCNC: 123 MG/DL (ref 65–139)
HBV SURFACE AG SERPL QL IA: NEGATIVE
HCV IGG SERPL QL IA: NON REACTIVE
HGB BLD-MCNC: 11.6 G/DL (ref 12–15.9)
HIV 1+2 AB+HIV1 P24 AG SERPL QL IA: NON REACTIVE
RPR SER QL: NON REACTIVE

## 2024-01-31 NOTE — OUTREACH NOTE
[facility]  Unable to Reach       Questions/Answers      Flowsheet Row Responses   Pending Outreach Prenatal Check-in   Call Attempt First   Outcome No answer/busy                Kassi Parikh RN  Maternity Nurse Navigator    1/31/2024, 11:02 CST

## 2024-02-02 ENCOUNTER — PATIENT OUTREACH (OUTPATIENT)
Dept: LABOR AND DELIVERY | Facility: HOSPITAL | Age: 29
End: 2024-02-02
Payer: COMMERCIAL

## 2024-02-02 NOTE — OUTREACH NOTE
[facility]  Unable to Reach       Questions/Answers      Flowsheet Row Responses   Pending Outreach Prenatal Check-in   Call Attempt Second   Outcome No answer/busy, [facility] message sent to patient            Resource letter sent to client in [facility] with resources and prenatal education    Kassi Parikh RN  Maternity Nurse Navigator    2/2/2024, 09:56 CST

## 2024-02-07 ENCOUNTER — HOSPITAL ENCOUNTER (EMERGENCY)
Facility: HOSPITAL | Age: 29
Discharge: HOME | End: 2024-02-07
Admitting: FAMILY MEDICINE
Payer: COMMERCIAL

## 2024-02-07 ENCOUNTER — APPOINTMENT (OUTPATIENT)
Dept: GENERAL RADIOLOGY | Facility: HOSPITAL | Age: 29
End: 2024-02-07
Payer: COMMERCIAL

## 2024-02-07 ENCOUNTER — APPOINTMENT (OUTPATIENT)
Dept: CT IMAGING | Facility: HOSPITAL | Age: 29
End: 2024-02-07
Payer: COMMERCIAL

## 2024-02-07 VITALS
BODY MASS INDEX: 23.04 KG/M2 | TEMPERATURE: 98.1 F | SYSTOLIC BLOOD PRESSURE: 115 MMHG | OXYGEN SATURATION: 98 % | DIASTOLIC BLOOD PRESSURE: 65 MMHG | RESPIRATION RATE: 18 BRPM | HEIGHT: 63 IN | WEIGHT: 130 LBS | HEART RATE: 86 BPM

## 2024-02-07 DIAGNOSIS — R06.02 SHORTNESS OF BREATH: ICD-10-CM

## 2024-02-07 DIAGNOSIS — N39.0 URINARY TRACT INFECTION WITHOUT HEMATURIA, SITE UNSPECIFIED: ICD-10-CM

## 2024-02-07 DIAGNOSIS — R07.9 CHEST PAIN, UNSPECIFIED TYPE: Primary | ICD-10-CM

## 2024-02-07 LAB
ALBUMIN SERPL-MCNC: 4.1 G/DL (ref 3.5–5.2)
ALBUMIN/GLOB SERPL: 1.1 G/DL
ALP SERPL-CCNC: 97 U/L (ref 39–117)
ALT SERPL W P-5'-P-CCNC: 13 U/L (ref 1–33)
ANION GAP SERPL CALCULATED.3IONS-SCNC: 11 MMOL/L (ref 5–15)
AST SERPL-CCNC: 20 U/L (ref 1–32)
BACTERIA UR QL AUTO: ABNORMAL /HPF
BASOPHILS # BLD AUTO: 0.08 10*3/MM3 (ref 0–0.2)
BASOPHILS NFR BLD AUTO: 0.6 % (ref 0–1.5)
BILIRUB SERPL-MCNC: 0.2 MG/DL (ref 0–1.2)
BILIRUB UR QL STRIP: NEGATIVE
BUN SERPL-MCNC: 10 MG/DL (ref 6–20)
BUN/CREAT SERPL: 31.3 (ref 7–25)
CALCIUM SPEC-SCNC: 9.4 MG/DL (ref 8.6–10.5)
CHLORIDE SERPL-SCNC: 101 MMOL/L (ref 98–107)
CLARITY UR: CLEAR
CO2 SERPL-SCNC: 24 MMOL/L (ref 22–29)
COLOR UR: YELLOW
CREAT SERPL-MCNC: 0.32 MG/DL (ref 0.57–1)
D DIMER PPP FEU-MCNC: 0.8 MCGFEU/ML (ref 0–0.5)
DEPRECATED RDW RBC AUTO: 43.2 FL (ref 37–54)
EGFRCR SERPLBLD CKD-EPI 2021: 147 ML/MIN/1.73
EOSINOPHIL # BLD AUTO: 0.3 10*3/MM3 (ref 0–0.4)
EOSINOPHIL NFR BLD AUTO: 2.1 % (ref 0.3–6.2)
ERYTHROCYTE [DISTWIDTH] IN BLOOD BY AUTOMATED COUNT: 12.6 % (ref 12.3–15.4)
GEN 5 2HR TROPONIN T REFLEX: <6 NG/L
GLOBULIN UR ELPH-MCNC: 3.7 GM/DL
GLUCOSE SERPL-MCNC: 88 MG/DL (ref 65–99)
GLUCOSE UR STRIP-MCNC: NEGATIVE MG/DL
HCT VFR BLD AUTO: 40 % (ref 34–46.6)
HGB BLD-MCNC: 13.1 G/DL (ref 12–15.9)
HGB UR QL STRIP.AUTO: NEGATIVE
HOLD SPECIMEN: NORMAL
HOLD SPECIMEN: NORMAL
HYALINE CASTS UR QL AUTO: ABNORMAL /LPF
IMM GRANULOCYTES # BLD AUTO: 0.09 10*3/MM3 (ref 0–0.05)
IMM GRANULOCYTES NFR BLD AUTO: 0.6 % (ref 0–0.5)
KETONES UR QL STRIP: NEGATIVE
LEUKOCYTE ESTERASE UR QL STRIP.AUTO: ABNORMAL
LYMPHOCYTES # BLD AUTO: 2.77 10*3/MM3 (ref 0.7–3.1)
LYMPHOCYTES NFR BLD AUTO: 19.5 % (ref 19.6–45.3)
MCH RBC QN AUTO: 31.2 PG (ref 26.6–33)
MCHC RBC AUTO-ENTMCNC: 32.8 G/DL (ref 31.5–35.7)
MCV RBC AUTO: 95.2 FL (ref 79–97)
MONOCYTES # BLD AUTO: 0.73 10*3/MM3 (ref 0.1–0.9)
MONOCYTES NFR BLD AUTO: 5.1 % (ref 5–12)
NEUTROPHILS NFR BLD AUTO: 10.23 10*3/MM3 (ref 1.7–7)
NEUTROPHILS NFR BLD AUTO: 72.1 % (ref 42.7–76)
NITRITE UR QL STRIP: POSITIVE
NRBC BLD AUTO-RTO: 0 /100 WBC (ref 0–0.2)
NT-PROBNP SERPL-MCNC: 44.1 PG/ML (ref 0–450)
PH UR STRIP.AUTO: 6 [PH] (ref 5–8)
PLATELET # BLD AUTO: 271 10*3/MM3 (ref 140–450)
PMV BLD AUTO: 9.8 FL (ref 6–12)
POTASSIUM SERPL-SCNC: 3.7 MMOL/L (ref 3.5–5.2)
PROT SERPL-MCNC: 7.8 G/DL (ref 6–8.5)
PROT UR QL STRIP: NEGATIVE
RBC # BLD AUTO: 4.2 10*6/MM3 (ref 3.77–5.28)
RBC # UR STRIP: ABNORMAL /HPF
REF LAB TEST METHOD: ABNORMAL
SODIUM SERPL-SCNC: 136 MMOL/L (ref 136–145)
SP GR UR STRIP: 1.02 (ref 1–1.03)
SQUAMOUS #/AREA URNS HPF: ABNORMAL /HPF
TROPONIN T DELTA: NORMAL
TROPONIN T SERPL HS-MCNC: <6 NG/L
UROBILINOGEN UR QL STRIP: ABNORMAL
WBC # UR STRIP: ABNORMAL /HPF
WBC NRBC COR # BLD AUTO: 14.2 10*3/MM3 (ref 3.4–10.8)
WHOLE BLOOD HOLD COAG: NORMAL
WHOLE BLOOD HOLD SPECIMEN: NORMAL

## 2024-02-07 PROCEDURE — 36415 COLL VENOUS BLD VENIPUNCTURE: CPT

## 2024-02-07 PROCEDURE — 93010 ELECTROCARDIOGRAM REPORT: CPT | Performed by: INTERNAL MEDICINE

## 2024-02-07 PROCEDURE — 85025 COMPLETE CBC W/AUTO DIFF WBC: CPT

## 2024-02-07 PROCEDURE — 99285 EMERGENCY DEPT VISIT HI MDM: CPT

## 2024-02-07 PROCEDURE — 93005 ELECTROCARDIOGRAM TRACING: CPT | Performed by: EMERGENCY MEDICINE

## 2024-02-07 PROCEDURE — 81001 URINALYSIS AUTO W/SCOPE: CPT

## 2024-02-07 PROCEDURE — 71275 CT ANGIOGRAPHY CHEST: CPT

## 2024-02-07 PROCEDURE — 83880 ASSAY OF NATRIURETIC PEPTIDE: CPT

## 2024-02-07 PROCEDURE — 80053 COMPREHEN METABOLIC PANEL: CPT

## 2024-02-07 PROCEDURE — 85379 FIBRIN DEGRADATION QUANT: CPT

## 2024-02-07 PROCEDURE — 96365 THER/PROPH/DIAG IV INF INIT: CPT

## 2024-02-07 PROCEDURE — 84484 ASSAY OF TROPONIN QUANT: CPT

## 2024-02-07 PROCEDURE — 25010000002 CEFTRIAXONE PER 250 MG

## 2024-02-07 PROCEDURE — 25510000001 IOPAMIDOL PER 1 ML

## 2024-02-07 RX ORDER — CEFDINIR 300 MG/1
300 CAPSULE ORAL 2 TIMES DAILY
Qty: 14 CAPSULE | Refills: 0 | Status: SHIPPED | OUTPATIENT
Start: 2024-02-07 | End: 2024-02-14

## 2024-02-07 RX ORDER — SODIUM CHLORIDE 0.9 % (FLUSH) 0.9 %
10 SYRINGE (ML) INJECTION AS NEEDED
Status: DISCONTINUED | OUTPATIENT
Start: 2024-02-07 | End: 2024-02-07 | Stop reason: HOSPADM

## 2024-02-07 RX ADMIN — CEFTRIAXONE 1000 MG: 1 INJECTION, POWDER, FOR SOLUTION INTRAMUSCULAR; INTRAVENOUS at 15:58

## 2024-02-07 RX ADMIN — IOPAMIDOL 100 ML: 755 INJECTION, SOLUTION INTRAVENOUS at 18:41

## 2024-02-07 NOTE — NURSING NOTE
FHT reactive/reassuring for gestation. RN made aware of strip. Patient educated on reasons to present to L&D.    Kamilah Morrison RN  14:47 CST

## 2024-02-07 NOTE — ED PROVIDER NOTES
"Subjective   History of Present Illness  Patient is a 28-year-old female who presents emergency department with complaints of chest pain and shortness of breath.  Patient states that she was at work whenever she developed chest pain.  She described it as a pressure/tight sensation.  States that she is short of breath with the pain.  Patient also states whenever she is having chest pain, she has bladder pressure as well.  Patient thinks that she does have a urinary tract infection due to her urine being dark.  She denies urinary frequency or dysuria.  She denies abdominal pain, pelvic pain.  Denies vaginal discharge or bleeding. Denies leg swelling. Patient states that she is 29 weeks pregnant and she can still feel the baby kicking.  Patient with no history of DVT or pulmonary embolism.  Denies cardiac history.        Review of Systems   Respiratory:  Positive for shortness of breath.    Cardiovascular:  Positive for chest pain.   Genitourinary:         \"Bladder pressure\"   All other systems reviewed and are negative.      Past Medical History:   Diagnosis Date    Depression 07/01/2023    Gonorrhea     + on 4/16/2018... - on 5/17/2018    Herpes     HSV1- valtrex prescribed    Recurrent pregnancy loss, antepartum condition or complication 10/08/2020    Urinary tract infection     Urogenital trichomoniasis     + on 3/29/2018 - on 5/17/2018       No Known Allergies    Past Surgical History:   Procedure Laterality Date    DENTAL PROCEDURE      WISDOM TOOTH EXTRACTION         Family History   Problem Relation Age of Onset    Diabetes Maternal Grandmother     Diabetes Maternal Grandfather     Breast cancer Neg Hx     Ovarian cancer Neg Hx     Uterine cancer Neg Hx     Colon cancer Neg Hx        Social History     Socioeconomic History    Marital status: Single   Tobacco Use    Smoking status: Never    Smokeless tobacco: Never   Vaping Use    Vaping Use: Never used   Substance and Sexual Activity    Alcohol use: Not " Currently     Alcohol/week: 1.0 standard drink of alcohol     Types: 1 Drinks containing 0.5 oz of alcohol per week    Drug use: No    Sexual activity: Yes     Partners: Male     Birth control/protection: None     Comment: 2 weeks           Objective   Physical Exam  Vitals and nursing note reviewed.   Constitutional:       General: She is not in acute distress.     Appearance: Normal appearance. She is normal weight. She is not ill-appearing or toxic-appearing.   HENT:      Head: Normocephalic.      Nose: Nose normal.   Cardiovascular:      Rate and Rhythm: Normal rate and regular rhythm.      Pulses: Normal pulses.      Heart sounds: Normal heart sounds.   Pulmonary:      Effort: Pulmonary effort is normal.      Breath sounds: Normal breath sounds.   Abdominal:      General: Abdomen is flat. Bowel sounds are normal. There is no distension.      Palpations: Abdomen is soft.      Tenderness: There is no abdominal tenderness.   Musculoskeletal:         General: Normal range of motion.      Cervical back: Normal range of motion and neck supple.   Skin:     General: Skin is warm and dry.   Neurological:      General: No focal deficit present.      Mental Status: She is alert and oriented to person, place, and time. Mental status is at baseline.   Psychiatric:         Mood and Affect: Mood normal.         Behavior: Behavior normal.         Thought Content: Thought content normal.         Judgment: Judgment normal.         Procedures           ED Course  ED Course as of 02/07/24 2237   Wed Feb 07, 2024   1630 Patient is D-dimer is 0.8.  Patient is 29 weeks pregnant.  I have discussed the risks of doing a CT scan with contrast due to her being pregnant and possible fetal complications.  Patient understands the risk and wishes to proceed with the CT angiogram chest to rule out pulmonary embolism. [KR]      ED Course User Index  [KR] Tiffanie Nunez APRN                HEART Score: 0                              Medical  Decision Making  Patient is a 28-year-old female who presents emergency department with complaints of chest pain and shortness of breath.  Patient states that she was at work whenever she developed chest pain.  She described it as a pressure/tight sensation.  States that she is short of breath with the pain.  Patient also states whenever she is having chest pain, she has bladder pressure as well.  Patient thinks that she does have a urinary tract infection due to her urine being dark.  She denies urinary frequency or dysuria.  She denies abdominal pain, pelvic pain.  Denies vaginal discharge or bleeding. Denies leg swelling. Patient states that she is 29 weeks pregnant and she can still feel the baby kicking.  Patient with no history of DVT or pulmonary embolism.  Denies cardiac history.    Patient was non-toxic and not-ill appearing on arrival. Vital signs stable.     Patient's presentation raises suspicion for differentials including, but not limited to, ACS, pulmonary embolism, pneumonia.     External (non-ED) record review: None    Given this, iDanna was placed on the monitor. Laboratory studies and imaging studies were ordered including CBC, CMP, BNP, troponin, D-dimer, urinalysis, EKG, CT angiogram chest.    Patient's D-dimer 0.8.  Patient is 29 weeks pregnant.  I discussed the risks of doing a CT scan with contrast due to her being pregnant and possible fetal complications.  Patient still wished to undergo a CT angiogram chest to rule out pulmonary embolism.    Dianna was given IV Rocephin in the ER.    Imaging was reviewed by radiologist.  CT angiogram chest revealed  No evidence of pulmonary thromboembolic disease. The thoracic aorta is normal in caliber with no evidence of dissection or aneurysm. 5 mm nodule in the left lower lobe warranting follow-up per Fleischner criteria. There is bibasilar atelectasis and remote granulomatous disease. The lungs are otherwise clear.     Decision rules/scores  evaluated: Heart score 0.  Troponin unremarkable.  EKG with no ST elevation.  Low suspicion for ACS.    Labs were reviewed.  Troponin and BNP unremarkable.  D-dimer 0.8.  CBC with leukocytosis, stable H&H.  CMP unremarkable.  Urinalysis with positive nitrate, 4+ bacteria, 3-5 WBCs.  Patient was given IV Rocephin in the ER.  Oral antibiotics were prescribed at discharge.  On re-evaluation, patient remained hemodynamically stable and appeared to be comfortable and in no acute distress.    I discussed all of the lab and imaging results with the patient during this visit in the emergency department. I answered all the questions regarding the emergency department evaluation, diagnosis, and treatment plan. We talked about how crucial it is for the patient to follow up by calling their primary care provider and OB/GYN as soon as possible to schedule an appointment for within the next few days or as soon as possible so that the symptoms can be reassessed to see if they have improved or to answer any additional questions. I also provided the patient with advice on returning safely and urged the patient to visit the emergency department right away if any worsening or new symptoms appeared. The patient verbalized understanding of the discharge instructions and agreed with them. Dianna was discharged in stable condition.    Signed by:   KIANA Reyes 2/7/2024 19:28 CST     Dragon disclaimer:  Part of this note may be an electronic transcription/translation of spoken language to printed text using the Dragon Dictation System.    Problems Addressed:  Chest pain, unspecified type: acute illness or injury  Shortness of breath: acute illness or injury  Urinary tract infection without hematuria, site unspecified: acute illness or injury    Amount and/or Complexity of Data Reviewed  Radiology: ordered.    Risk  Prescription drug management.        Final diagnoses:   Chest pain, unspecified type   Shortness of breath   Urinary  tract infection without hematuria, site unspecified       ED Disposition  ED Disposition       ED Disposition   Discharge    Condition   Stable    Comment   --               Provider, No Known  [facility]  [place] [id number]  [phone]    Schedule an appointment as soon as possible for a visit in 1 day      [facility] EMERGENCY DEPARTMENT  [address] [facility] [id number] [phone]  Go to   If symptoms worsen    Lavelle Velasquez MD  [address] [facility] [address]  [place] [id number]  [phone]    Schedule an appointment as soon as possible for a visit in 1 day           Medication List        New Prescriptions      cefdinir 300 MG capsule  Commonly known as: OMNICEF  Take 1 capsule by mouth 2 (Two) Times a Day for 7 days.            Stop      nitrofurantoin (macrocrystal-monohydrate) 100 MG capsule  Commonly known as: MACROBID               Where to Get Your Medications        These medications were sent to [facility] [address] [facility] [phone] [facility] [phone] [address] [address]      Phone: [phone]   cefdinir 300 MG capsule            Tiffanie Nunez, APRN  02/07/24 2605

## 2024-02-08 LAB
QT INTERVAL: 352 MS
QTC INTERVAL: 437 MS

## 2024-02-08 NOTE — DISCHARGE INSTRUCTIONS
Today you are seen in the ER for your symptoms.  Your cardiac enzymes were normal.  Your CT chest did not reveal any blood clot.  It did reveal a small nodule which will need to be followed up on an outpatient basis.  You do have a UTI as well.  Antibiotics were prescribed.  Please take the full course of this.  Please follow-up with primary care provider and OB/GYN as soon as possible to reassess symptoms.  Please return the ER for any new or worsening symptoms.

## 2024-02-09 ENCOUNTER — TELEPHONE (OUTPATIENT)
Dept: OBSTETRICS AND GYNECOLOGY | Age: 29
End: 2024-02-09

## 2024-02-09 ENCOUNTER — ROUTINE PRENATAL (OUTPATIENT)
Dept: OBSTETRICS AND GYNECOLOGY | Age: 29
End: 2024-02-09
Payer: COMMERCIAL

## 2024-02-09 VITALS — BODY MASS INDEX: 24.09 KG/M2 | WEIGHT: 136 LBS | DIASTOLIC BLOOD PRESSURE: 64 MMHG | SYSTOLIC BLOOD PRESSURE: 118 MMHG

## 2024-02-09 DIAGNOSIS — Z3A.29 29 WEEKS GESTATION OF PREGNANCY: Primary | ICD-10-CM

## 2024-02-09 DIAGNOSIS — O23.43 URINARY TRACT INFECTION IN MOTHER DURING THIRD TRIMESTER OF PREGNANCY: ICD-10-CM

## 2024-02-09 DIAGNOSIS — R91.1 LUNG NODULE SEEN ON IMAGING STUDY: ICD-10-CM

## 2024-02-09 LAB
GLUCOSE UR STRIP-MCNC: NEGATIVE MG/DL
PROT UR STRIP-MCNC: ABNORMAL MG/DL

## 2024-02-09 NOTE — TELEPHONE ENCOUNTER
Caller: Dianna Damon A    Relationship to patient: Self    Best call back number: [phone] PLEASE CALL BACK AT THIS NUMBER    WOULD LIKE TO KNOW IF THERE IS AN EMAIL SHE CAN EMAIL [employer] LEAVE PAPERWORK.

## 2024-02-09 NOTE — TELEPHONE ENCOUNTER
Called patient to ask her to fax or bring physical copy in to office. She has an appt on Tuesday, she is going to bring it then. No other questions or concerns.

## 2024-02-09 NOTE — PROGRESS NOTES
No complaints. Endorses appropriate fetal movement. Denies regular contractions, leakage of fluid, vaginal bleeding or discharge. Was seen in the ED recently for anxiety/shortness of breath. Was noted to have lung nodule, 5 mm. Discussed repeating CT scan postpartum. Also with UTI. Culture pending? On antibiotics. Culture added on. Questions answered. She expressed understanding.     Kick counts/Labor precautions  RTC 1 weeks as scheduled    Diagnoses and all orders for this visit:    1. 29 weeks gestation of pregnancy (Primary)  -     POC Urinalysis Dipstick    2. Lung nodule seen on imaging study  Comments:  repeat 6 months/postpartum    3. Urinary tract infection in mother during third trimester of pregnancy  -     Urine Culture - Urine, Urine, Clean Catch

## 2024-02-13 ENCOUNTER — ROUTINE PRENATAL (OUTPATIENT)
Dept: OBSTETRICS AND GYNECOLOGY | Age: 29
End: 2024-02-13
Payer: COMMERCIAL

## 2024-02-13 VITALS — SYSTOLIC BLOOD PRESSURE: 114 MMHG | DIASTOLIC BLOOD PRESSURE: 68 MMHG | WEIGHT: 137 LBS | BODY MASS INDEX: 24.27 KG/M2

## 2024-02-13 DIAGNOSIS — O26.893 RH NEGATIVE STATUS DURING PREGNANCY IN THIRD TRIMESTER: ICD-10-CM

## 2024-02-13 DIAGNOSIS — Z34.83 MULTIGRAVIDA IN THIRD TRIMESTER: ICD-10-CM

## 2024-02-13 DIAGNOSIS — Z3A.30 30 WEEKS GESTATION OF PREGNANCY: Primary | ICD-10-CM

## 2024-02-13 DIAGNOSIS — Z28.21 INFLUENZA VACCINATION DECLINED: ICD-10-CM

## 2024-02-13 DIAGNOSIS — Z67.91 RH NEGATIVE STATUS DURING PREGNANCY IN THIRD TRIMESTER: ICD-10-CM

## 2024-02-13 LAB
GLUCOSE UR STRIP-MCNC: NEGATIVE MG/DL
PROT UR STRIP-MCNC: ABNORMAL MG/DL

## 2024-02-27 ENCOUNTER — ROUTINE PRENATAL (OUTPATIENT)
Dept: OBSTETRICS AND GYNECOLOGY | Age: 29
End: 2024-02-27
Payer: COMMERCIAL

## 2024-02-27 VITALS — DIASTOLIC BLOOD PRESSURE: 72 MMHG | WEIGHT: 136.8 LBS | BODY MASS INDEX: 24.23 KG/M2 | SYSTOLIC BLOOD PRESSURE: 114 MMHG

## 2024-02-27 DIAGNOSIS — Z3A.32 32 WEEKS GESTATION OF PREGNANCY: Primary | ICD-10-CM

## 2024-02-27 DIAGNOSIS — Z34.83 MULTIGRAVIDA IN THIRD TRIMESTER: ICD-10-CM

## 2024-02-27 LAB
GLUCOSE UR STRIP-MCNC: NEGATIVE MG/DL
PROT UR STRIP-MCNC: ABNORMAL MG/DL

## 2024-02-27 NOTE — PROGRESS NOTES
No complaints. Endorses appropriate fetal movement. Denies regular contractions, leakage of fluid, vaginal bleeding or discharge.    Kick counts/Labor precautions  RTC 2 weeks    Diagnoses and all orders for this visit:    1. 32 weeks gestation of pregnancy (Primary)  -     POC Urinalysis Dipstick    2. Multigravida in third trimester

## 2024-03-13 ENCOUNTER — ROUTINE PRENATAL (OUTPATIENT)
Dept: OBSTETRICS AND GYNECOLOGY | Age: 29
End: 2024-03-13
Payer: COMMERCIAL

## 2024-03-13 VITALS — WEIGHT: 140 LBS | SYSTOLIC BLOOD PRESSURE: 118 MMHG | DIASTOLIC BLOOD PRESSURE: 74 MMHG | BODY MASS INDEX: 24.8 KG/M2

## 2024-03-13 DIAGNOSIS — Z3A.34 34 WEEKS GESTATION OF PREGNANCY: Primary | ICD-10-CM

## 2024-03-13 DIAGNOSIS — O26.13 LOW WEIGHT GAIN DURING PREGNANCY IN THIRD TRIMESTER: ICD-10-CM

## 2024-03-13 DIAGNOSIS — Z34.83 MULTIGRAVIDA IN THIRD TRIMESTER: ICD-10-CM

## 2024-03-13 DIAGNOSIS — O26.849 UTERINE SIZE DATE DISCREPANCY PREGNANCY: ICD-10-CM

## 2024-03-13 LAB
GLUCOSE UR STRIP-MCNC: NEGATIVE MG/DL
PROT UR STRIP-MCNC: NEGATIVE MG/DL

## 2024-03-13 NOTE — PROGRESS NOTES
No complaints. Endorses appropriate fetal movement. Denies regular contractions, leakage of fluid, vaginal bleeding or discharge.    Check growth next visit as low maternal weight gain this pregnancy - patient denies n/v. Doing small meals. Fundus measuring 33 cm, appropriate for 34 weeks.     Kick counts/Labor precautions  RTC 2 weeks      Diagnoses and all orders for this visit:    1. 34 weeks gestation of pregnancy (Primary)  -     POC Urinalysis Dipstick    2. Multigravida in third trimester    3. Low weight gain during pregnancy in third trimester    4. Uterine size date discrepancy pregnancy

## 2024-03-26 ENCOUNTER — ROUTINE PRENATAL (OUTPATIENT)
Age: 29
End: 2024-03-26
Payer: COMMERCIAL

## 2024-03-26 ENCOUNTER — PATIENT OUTREACH (OUTPATIENT)
Dept: LABOR AND DELIVERY | Facility: HOSPITAL | Age: 29
End: 2024-03-26
Payer: COMMERCIAL

## 2024-03-26 VITALS — BODY MASS INDEX: 25.33 KG/M2 | SYSTOLIC BLOOD PRESSURE: 124 MMHG | DIASTOLIC BLOOD PRESSURE: 66 MMHG | WEIGHT: 143 LBS

## 2024-03-26 DIAGNOSIS — Z67.91 RH NEGATIVE STATUS DURING PREGNANCY IN THIRD TRIMESTER: ICD-10-CM

## 2024-03-26 DIAGNOSIS — O26.893 RH NEGATIVE STATUS DURING PREGNANCY IN THIRD TRIMESTER: ICD-10-CM

## 2024-03-26 DIAGNOSIS — Z34.83 MULTIGRAVIDA IN THIRD TRIMESTER: ICD-10-CM

## 2024-03-26 DIAGNOSIS — O26.849 UTERINE SIZE DATE DISCREPANCY PREGNANCY: ICD-10-CM

## 2024-03-26 DIAGNOSIS — Z3A.36 36 WEEKS GESTATION OF PREGNANCY: Primary | ICD-10-CM

## 2024-03-26 LAB
GLUCOSE UR STRIP-MCNC: NEGATIVE MG/DL
PROT UR STRIP-MCNC: NEGATIVE MG/DL

## 2024-03-26 NOTE — OUTREACH NOTE
[facility]  Check-In    Current Estimated Gestational Age: 36w0d      EPDS questionnaire sent to Dianna in [facility] prior to our telephone check-in this week.     Kassi Parikh RN  Maternity Nurse Navigator    3/26/2024, 15:40 CDT

## 2024-03-26 NOTE — PROGRESS NOTES
Reason for visit: Routine OB visit at 36w0d     CC:  She had some mucous discharge. Endorses good fetal movement. Denies VB, LOF, contractions, h/a, visual changes, and right upper quadrant pain.     ROS: All systems reviewed and are negative.    Wt 143 lb for a TWG of 4.964 kg (10 lb 15.1 oz), /66, FHTs 146, FH 30 cm  Urine today and reviewed: negative glucose, negative protein      36-week US: EFW 17.1% (2459g - 5# 7 oz), AC 13.1%, TRACEE 7.56 cm, DVP 2.71 cm, right lateral posterior placenta; vertex; BPP 8/8 - reassuring; UA S/D 2.20 - normal      Exam:  General Appearance:  No visualized signs of distress. Normal mood and behavior  Cardiorespiratory: HR str and reg. Lungs clear. Resp even and unlabored.  Abdomen: is soft and nontender. No CVA tenderness. Uterus is smaller than expected for gestational age  Ext: No edema. Calves non-tender.     Impression  Diagnoses and all orders for this visit:    1. 36 weeks gestation of pregnancy (Primary)  Discussed and encouraged practicing measures of relaxation during the birthing experience. Also discussed measures to support or promote labor at term. She is planning to formula feed and desires for Dr. Ledesma to be the pediatrician after discharge from the hospital. She understands having the pediatrician on-call at the time of delivery for care after birth while admitted to the hospital.   -     POC Urinalysis Dipstick  -     Chlamydia trachomatis, Neisseria gonorrhoeae, Trichomonas vaginalis, PCR - Swab, Vagina  -     Strep B Screen - Swab, Vaginal/Rectum    2. Multigravida in third trimester  Discussed third trimester discomforts and measures of support, fetal movement counts, signs of labor, preeclampsia warnings, and signs and symptoms to report. Reviewed GBS and STI cultures to be collected today. Discussed and encouraged to come to the hospital or call with vaginal bleeding, leaking of fluid, regular contractions, or other concerns. Signs and Symptoms of  Labor and Alternative Pain Management During Labor and Delivery videos included in the AVS.    3. Rh negative status during pregnancy in third trimester  Received Rhogam last on 1/30/2024.     4. Uterine size date discrepancy pregnancy  Interval growth ultrasound today shows appropriate interval growth with normal UA dopplers.           Return to the office in 1 week for routine prenatal with Dr. Velasquez with an ultrasound for follow-up of TRACEE and BPP and as needed with concerns.        This note has been signed electronically.    Nathalia Clark, KATY, APRN, CNM, RNC-OB

## 2024-03-27 ENCOUNTER — PATIENT OUTREACH (OUTPATIENT)
Dept: LABOR AND DELIVERY | Facility: HOSPITAL | Age: 29
End: 2024-03-27
Payer: COMMERCIAL

## 2024-03-27 NOTE — OUTREACH NOTE
[facility]  Check-In    Current Estimated Gestational Age: 36w1d      Questions/Answers      Flowsheet Row Responses   Best Method for Contacting Cell   Currently Employed No   Able to keep appointments as scheduled No   Gender(s) and Name(s) female   Baby Active/Feeling Fetal Movemen Yes   How are you presently feeling? states she had some brownish discharge upon going to the bathroom a few times after Nathalia checked her cervix yesterday.  I reviewed labor precautions with her.   Are you having any of the following symptoms? Vaginal Discharge   Questions regarding prenatal visits or tests to be ordered? Yes   Questions see above   Special Considerations Birthing Ball, Peanut Ball   Supplies ready for baby Car Seat, Clothing, Crib, Feeding Supplies, Diapers   Resource/Environmental Concerns None   Do you have any questions related to your care experience, your pregnancy, plans for delivery, any concerns, etc? No          Resource letter sent to Dianna boss [facility].     Kassi Parikh RN  Maternity Nurse Navigator    3/27/2024, 10:35 CDT

## 2024-03-28 DIAGNOSIS — A74.9 CHLAMYDIA INFECTION AFFECTING PREGNANCY IN THIRD TRIMESTER: Primary | ICD-10-CM

## 2024-03-28 DIAGNOSIS — O98.813 CHLAMYDIA INFECTION AFFECTING PREGNANCY IN THIRD TRIMESTER: Primary | ICD-10-CM

## 2024-03-28 LAB
C TRACH RRNA SPEC QL NAA+PROBE: POSITIVE
GP B STREP DNA SPEC QL NAA+PROBE: NEGATIVE
N GONORRHOEA RRNA SPEC QL NAA+PROBE: NEGATIVE
T VAGINALIS RRNA SPEC QL NAA+PROBE: NEGATIVE

## 2024-03-28 RX ORDER — AZITHROMYCIN 500 MG/1
1000 TABLET, FILM COATED ORAL ONCE
Qty: 2 TABLET | Refills: 0 | Status: SHIPPED | OUTPATIENT
Start: 2024-03-28 | End: 2024-03-28

## 2024-04-01 ENCOUNTER — TELEPHONE (OUTPATIENT)
Dept: OBSTETRICS AND GYNECOLOGY | Age: 29
End: 2024-04-01
Payer: COMMERCIAL

## 2024-04-01 ENCOUNTER — HOSPITAL ENCOUNTER (OUTPATIENT)
Facility: HOSPITAL | Age: 29
Discharge: HOME | End: 2024-04-01
Attending: OBSTETRICS & GYNECOLOGY | Admitting: OBSTETRICS & GYNECOLOGY
Payer: COMMERCIAL

## 2024-04-01 VITALS
TEMPERATURE: 97.6 F | HEIGHT: 62 IN | BODY MASS INDEX: 25.8 KG/M2 | DIASTOLIC BLOOD PRESSURE: 71 MMHG | HEART RATE: 96 BPM | WEIGHT: 140.2 LBS | OXYGEN SATURATION: 99 % | RESPIRATION RATE: 16 BRPM | SYSTOLIC BLOOD PRESSURE: 113 MMHG

## 2024-04-01 PROBLEM — Z34.90 PREGNANCY: Status: ACTIVE | Noted: 2024-04-01

## 2024-04-01 PROCEDURE — G0378 HOSPITAL OBSERVATION PER HR: HCPCS

## 2024-04-01 PROCEDURE — G0463 HOSPITAL OUTPT CLINIC VISIT: HCPCS

## 2024-04-01 NOTE — TELEPHONE ENCOUNTER
Pt 36w6d called stating that she is having increase in contractions, pain in lower back/ hips, and when she stands up water is running down her legs. Pt advised to go to LDR for evaluation.

## 2024-04-01 NOTE — PROGRESS NOTES
Alexi Damon  : 1995  MRN: [id number]  CSN: [id number]    Labor progress note    Subjective   Patient presented with concerns of possible rupture of membranes. Fern and nitrizine negative. No contractions     Objective   Min/max vitals past 24 hours:  Temp  Min: 97.6 °F (36.4 °C)  Max: 97.6 °F (36.4 °C)   BP  Min: 113/71  Max: 113/71   Pulse  Min: 96  Max: 96   Resp  Min: 16  Max: 16        FHT's: reassuring and category 1.  external monitors used   Cervix: was checked (by me): 0 cm / 20 % / -3   Contractions: none      Assessment   IUP at 36w6d  Rupture of membranes not found  GBS negative  +Chlamydia - treated     Plan   Follow up with Dr. Velasquez tomorrow  OK for discharge    Gretel Lei MD  2024  10:57 CDT

## 2024-04-02 ENCOUNTER — ROUTINE PRENATAL (OUTPATIENT)
Dept: OBSTETRICS AND GYNECOLOGY | Age: 29
End: 2024-04-02
Payer: COMMERCIAL

## 2024-04-02 VITALS — WEIGHT: 140 LBS | BODY MASS INDEX: 25.61 KG/M2 | SYSTOLIC BLOOD PRESSURE: 118 MMHG | DIASTOLIC BLOOD PRESSURE: 74 MMHG

## 2024-04-02 DIAGNOSIS — Z34.83 MULTIGRAVIDA IN THIRD TRIMESTER: ICD-10-CM

## 2024-04-02 DIAGNOSIS — Z3A.37 37 WEEKS GESTATION OF PREGNANCY: Primary | ICD-10-CM

## 2024-04-02 DIAGNOSIS — O26.13 LOW WEIGHT GAIN DURING PREGNANCY IN THIRD TRIMESTER: ICD-10-CM

## 2024-04-02 LAB
GLUCOSE UR STRIP-MCNC: NEGATIVE MG/DL
PROT UR STRIP-MCNC: ABNORMAL MG/DL

## 2024-04-02 PROCEDURE — 99213 OFFICE O/P EST LOW 20 MIN: CPT | Performed by: OBSTETRICS & GYNECOLOGY

## 2024-04-02 NOTE — PROGRESS NOTES
No complaints. Endorses appropriate fetal movement. Denies regular contractions, leakage of fluid, vaginal bleeding or discharge. Reports she did  her antibiotic for chlamydia.     US today:  Intrauterine pregnancy at 37w0d  Placental location: right lateral  Fetal position: Vertex  Biophysical profile: Reassuring  8/8  TRACEE: 7.3 cm  DVP: 3.6 cm  Fetal heart rate: 130 bpm      Kick counts/Labor precautions  RTC 1 weeks    Diagnoses and all orders for this visit:    1. 37 weeks gestation of pregnancy (Primary)  -     POC Urinalysis Dipstick    2. Multigravida in third trimester    3. Low weight gain during pregnancy in third trimester

## 2024-04-09 ENCOUNTER — ROUTINE PRENATAL (OUTPATIENT)
Dept: OBSTETRICS AND GYNECOLOGY | Age: 29
End: 2024-04-09
Payer: COMMERCIAL

## 2024-04-09 VITALS — BODY MASS INDEX: 25.97 KG/M2 | WEIGHT: 142 LBS | DIASTOLIC BLOOD PRESSURE: 64 MMHG | SYSTOLIC BLOOD PRESSURE: 110 MMHG

## 2024-04-09 DIAGNOSIS — Z3A.38 38 WEEKS GESTATION OF PREGNANCY: Primary | ICD-10-CM

## 2024-04-09 DIAGNOSIS — Z34.83 MULTIGRAVIDA IN THIRD TRIMESTER: ICD-10-CM

## 2024-04-09 LAB
GLUCOSE UR STRIP-MCNC: NEGATIVE MG/DL
PROT UR STRIP-MCNC: ABNORMAL MG/DL

## 2024-04-09 PROCEDURE — 99213 OFFICE O/P EST LOW 20 MIN: CPT | Performed by: OBSTETRICS & GYNECOLOGY

## 2024-04-09 NOTE — PROGRESS NOTES
No complaints. Endorses appropriate fetal movement. Denies regular contractions, leakage of fluid, vaginal bleeding or discharge.    Total weight gain: 4.511 kg (9 lb 15.1 oz)  Expected weight gain: 11.5 kg (25 lb)-16 kg (35 lb)    US 3/26  EFW 17% - AC 13%, 2459 g    Kick counts/Labor precautions  RTC 1 weeks  Plan IOL 39-40 weeks per patient after 39 wk visit (4/16). Wants to see where she is next visit before scheduling.     Diagnoses and all orders for this visit:    1. 38 weeks gestation of pregnancy (Primary)  -     POC Urinalysis Dipstick    2. Multigravida in third trimester

## 2024-04-11 ENCOUNTER — HOSPITAL ENCOUNTER (OUTPATIENT)
Facility: HOSPITAL | Age: 29
Discharge: HOME | End: 2024-04-11
Attending: OBSTETRICS & GYNECOLOGY | Admitting: OBSTETRICS & GYNECOLOGY
Payer: COMMERCIAL

## 2024-04-11 VITALS
SYSTOLIC BLOOD PRESSURE: 126 MMHG | HEIGHT: 62 IN | HEART RATE: 97 BPM | WEIGHT: 142 LBS | DIASTOLIC BLOOD PRESSURE: 69 MMHG | BODY MASS INDEX: 26.13 KG/M2

## 2024-04-11 LAB
BACTERIA UR QL AUTO: ABNORMAL /HPF
BILIRUB UR QL STRIP: NEGATIVE
CLARITY UR: CLEAR
COLOR UR: ABNORMAL
GLUCOSE UR STRIP-MCNC: NEGATIVE MG/DL
HGB UR QL STRIP.AUTO: NEGATIVE
HYALINE CASTS UR QL AUTO: ABNORMAL /LPF
KETONES UR QL STRIP: NEGATIVE
LEUKOCYTE ESTERASE UR QL STRIP.AUTO: ABNORMAL
NITRITE UR QL STRIP: NEGATIVE
PH UR STRIP.AUTO: 7.5 [PH] (ref 5–8)
PROT UR QL STRIP: NEGATIVE
RBC # UR STRIP: ABNORMAL /HPF
REF LAB TEST METHOD: ABNORMAL
SP GR UR STRIP: 1.02 (ref 1–1.03)
SQUAMOUS #/AREA URNS HPF: ABNORMAL /HPF
UROBILINOGEN UR QL STRIP: ABNORMAL
WBC # UR STRIP: ABNORMAL /HPF

## 2024-04-11 PROCEDURE — G0463 HOSPITAL OUTPT CLINIC VISIT: HCPCS

## 2024-04-11 PROCEDURE — G0378 HOSPITAL OBSERVATION PER HR: HCPCS

## 2024-04-11 PROCEDURE — 81001 URINALYSIS AUTO W/SCOPE: CPT | Performed by: OBSTETRICS & GYNECOLOGY

## 2024-04-11 NOTE — ED NOTES
"[facility]   Dianna Damon  : 1995  MRN: [id number]  CSN: [id number]    History and Physical    Subjective   Dianna Damon is a 28 y.o.  who presented to L&D due to cramps and uti symptoms, denies leaking fluid or bleeding, contractions are irregular . Prenatal care unremarkable.  Reports good fetal mov pt has had two term vaginal deliveries an done miscarriage.    Past Medical History:   Diagnosis Date    Depression 2023    Gonorrhea     + on 2018... - on 2018    Herpes     HSV1- valtrex prescribed    Recurrent pregnancy loss, antepartum condition or complication 10/08/2020    Urinary tract infection     Urogenital trichomoniasis     + on 3/29/2018 - on 2018     Past Surgical History:   Procedure Laterality Date    DENTAL PROCEDURE      WISDOM TOOTH EXTRACTION       Social History    Tobacco Use      Smoking status: Never      Smokeless tobacco: Never    No current facility-administered medications for this encounter.    No Known Allergies    Review of Systems      Objective   /69   Pulse 97   Ht 157.5 cm (62\")   Wt 64.4 kg (142 lb)   LMP 2023 (Approximate)   BMI 25.97 kg/m²   General: well developed; well nourished  no acute distress   Heart: regular rate and rhythm, S1, S2 normal, no murmur, click, rub or gallop   Lungs: breathing is unlabored   Abdomen: soft, non-tender; no masses  no umbilical or inguinal hernias are present  no hepato-splenomegaly   Pelvis:: Clinical staff was present for exam, cervix long closed, vertex/ intact   Fht's cat 1  Very irregular contractions   Labs  Lab Results   Component Value Date     2024    HGB 13.1 2024    HCT 40.0 2024    WBC 14.20 (H) 2024     2024    K 3.7 2024     2024    CO2 24.0 2024    BUN 10 2024    CREATININE 0.32 (L) 2024    GLUCOSE 88 2024    ALBUMIN 4.1 2024    CALCIUM 9.4 2024    AST 20 2024    " ALT 13 02/07/2024    BILITOT 0.2 02/07/2024        Assessment   Possible uti  38 week gestation    The risks, benefits, and alternatives of the procedure; along with the risks of anesthesia was discussed in full with the patient and all questions were answered.       Plan   Discharge pt , instructions given    Bull Dinero MD  4/11/2024  11:23 CDT

## 2024-04-16 ENCOUNTER — ROUTINE PRENATAL (OUTPATIENT)
Dept: OBSTETRICS AND GYNECOLOGY | Age: 29
End: 2024-04-16
Payer: COMMERCIAL

## 2024-04-16 VITALS — DIASTOLIC BLOOD PRESSURE: 74 MMHG | WEIGHT: 142.3 LBS | BODY MASS INDEX: 26.03 KG/M2 | SYSTOLIC BLOOD PRESSURE: 120 MMHG

## 2024-04-16 DIAGNOSIS — Z3A.39 39 WEEKS GESTATION OF PREGNANCY: Primary | ICD-10-CM

## 2024-04-16 LAB
GLUCOSE UR STRIP-MCNC: NEGATIVE MG/DL
PROT UR STRIP-MCNC: NEGATIVE MG/DL

## 2024-04-16 RX ORDER — SODIUM CHLORIDE 0.9 % (FLUSH) 0.9 %
10 SYRINGE (ML) INJECTION EVERY 12 HOURS SCHEDULED
Status: CANCELLED | OUTPATIENT
Start: 2024-04-16

## 2024-04-16 RX ORDER — OXYTOCIN/0.9 % SODIUM CHLORIDE 30/500 ML
2-30 PLASTIC BAG, INJECTION (ML) INTRAVENOUS
Status: CANCELLED | OUTPATIENT
Start: 2024-04-16

## 2024-04-16 RX ORDER — CARBOPROST TROMETHAMINE 250 UG/ML
250 INJECTION, SOLUTION INTRAMUSCULAR AS NEEDED
Status: CANCELLED | OUTPATIENT
Start: 2024-04-16

## 2024-04-16 RX ORDER — ONDANSETRON 4 MG/1
4 TABLET, ORALLY DISINTEGRATING ORAL EVERY 6 HOURS PRN
Status: CANCELLED | OUTPATIENT
Start: 2024-04-16

## 2024-04-16 RX ORDER — CALCIUM CARBONATE 500 MG/1
2 TABLET, CHEWABLE ORAL 3 TIMES DAILY PRN
Status: CANCELLED | OUTPATIENT
Start: 2024-04-16

## 2024-04-16 RX ORDER — SODIUM CHLORIDE 0.9 % (FLUSH) 0.9 %
10 SYRINGE (ML) INJECTION AS NEEDED
Status: CANCELLED | OUTPATIENT
Start: 2024-04-16

## 2024-04-16 RX ORDER — HYDROMORPHONE HYDROCHLORIDE 1 MG/ML
1 INJECTION, SOLUTION INTRAMUSCULAR; INTRAVENOUS; SUBCUTANEOUS
Status: CANCELLED | OUTPATIENT
Start: 2024-04-16 | End: 2024-04-26

## 2024-04-16 RX ORDER — ACETAMINOPHEN 325 MG/1
650 TABLET ORAL EVERY 4 HOURS PRN
Status: CANCELLED | OUTPATIENT
Start: 2024-04-16

## 2024-04-16 RX ORDER — BUTORPHANOL TARTRATE 1 MG/ML
2 INJECTION, SOLUTION INTRAMUSCULAR; INTRAVENOUS
Status: CANCELLED | OUTPATIENT
Start: 2024-04-16

## 2024-04-16 RX ORDER — TERBUTALINE SULFATE 1 MG/ML
0.25 INJECTION, SOLUTION SUBCUTANEOUS AS NEEDED
Status: CANCELLED | OUTPATIENT
Start: 2024-04-16

## 2024-04-16 RX ORDER — SODIUM CHLORIDE 9 MG/ML
40 INJECTION, SOLUTION INTRAVENOUS AS NEEDED
Status: CANCELLED | OUTPATIENT
Start: 2024-04-16

## 2024-04-16 RX ORDER — OXYTOCIN/0.9 % SODIUM CHLORIDE 30/500 ML
250 PLASTIC BAG, INJECTION (ML) INTRAVENOUS CONTINUOUS
Status: CANCELLED | OUTPATIENT
Start: 2024-04-16 | End: 2024-04-16

## 2024-04-16 RX ORDER — ONDANSETRON 2 MG/ML
4 INJECTION INTRAMUSCULAR; INTRAVENOUS EVERY 6 HOURS PRN
Status: CANCELLED | OUTPATIENT
Start: 2024-04-16

## 2024-04-16 RX ORDER — OXYTOCIN/0.9 % SODIUM CHLORIDE 30/500 ML
999 PLASTIC BAG, INJECTION (ML) INTRAVENOUS ONCE
Status: CANCELLED | OUTPATIENT
Start: 2024-04-16 | End: 2024-04-16

## 2024-04-16 RX ORDER — SODIUM CHLORIDE, SODIUM LACTATE, POTASSIUM CHLORIDE, CALCIUM CHLORIDE 600; 310; 30; 20 MG/100ML; MG/100ML; MG/100ML; MG/100ML
125 INJECTION, SOLUTION INTRAVENOUS CONTINUOUS
Status: CANCELLED | OUTPATIENT
Start: 2024-04-16

## 2024-04-16 RX ORDER — MISOPROSTOL 100 UG/1
800 TABLET ORAL ONCE
Status: CANCELLED | OUTPATIENT
Start: 2024-04-16 | End: 2024-04-16

## 2024-04-16 RX ORDER — CITRIC ACID/SODIUM CITRATE 334-500MG
30 SOLUTION, ORAL ORAL ONCE AS NEEDED
Status: CANCELLED | OUTPATIENT
Start: 2024-04-16

## 2024-04-16 RX ORDER — BUTORPHANOL TARTRATE 1 MG/ML
1 INJECTION, SOLUTION INTRAMUSCULAR; INTRAVENOUS
Status: CANCELLED | OUTPATIENT
Start: 2024-04-16

## 2024-04-16 RX ORDER — IBUPROFEN 200 MG
600 TABLET ORAL EVERY 6 HOURS PRN
Status: CANCELLED | OUTPATIENT
Start: 2024-04-16

## 2024-04-16 RX ORDER — METHYLERGONOVINE MALEATE 0.2 MG/ML
200 INJECTION INTRAVENOUS ONCE AS NEEDED
Status: CANCELLED | OUTPATIENT
Start: 2024-04-16

## 2024-04-16 NOTE — H&P
[facility]  HISTORY AND PHYSICAL, OBGYN    Patient Name: Dianna Damon  : 1995  MRN: [id number]  Primary Care Physician:  Provider, No Known  Date of admission: (Not on file)    Subjective   Subjective     Chief Complaint:   Chief Complaint   Patient presents with    Routine Prenatal Visit     Patient here for routine obv, patient denies regular contractions, vaginal bleeding, leakage of fluid. Patient reports good fetal movement.          HPI: Dianna Damon is a 28 y.o. year old  with an Estimated Date of Delivery: 24 presenting for elective induction of labor on .     Prenatal care has been with Dr. Lavelle Velasquez  It has been complicated by:  GBS negative  H/o vacuum assisted delivery  Depression on medications  Varicella non-immune  O negative s/p rhogam  H/o gonorrhea/chlamydia in pregnancy in 1st and 3rd trimester    ROS:  All systems were reviewed and negative except for:   +lenard lugo contractions  -denies leakage of fluid or vaginal bleeding  +endorses appropriate fetal movement      Personal History     OB History    Para Term  AB Living   4 2 2 0 1 2   SAB IAB Ectopic Molar Multiple Live Births   1 0 0 0 0 2      # Outcome Date GA Lbr Curtis/2nd Weight Sex Type Anes PTL Lv   4 Current            3 SAB 10/10/20 5w0d          2 Term 19 39w2d 03:45 / 00:11 3005 g (6 lb 10 oz) M Vag-Spont EPI N ROLO      Birth Comments: 33cm - HC      Name: JEMMA DAMON      Apgar1: 8  Apgar5: 9   1 Term 18 39w1d / 00:31 3690 g (8 lb 2.2 oz) M Vag-Vacuum EPI N ROLO      Birth Comments: 33 cm; AGA      Name: JEMMA DAMON      Apgar1: 7  Apgar5: 9       Past Medical History:   Diagnosis Date    Depression 2023    Gonorrhea     + on 2018... - on 2018    Herpes     HSV1- valtrex prescribed    Recurrent pregnancy loss, antepartum condition or complication 10/08/2020    Urinary tract infection     Urogenital trichomoniasis     + on  3/29/2018 - on 5/17/2018       Past Surgical History:   Procedure Laterality Date    DENTAL PROCEDURE      WISDOM TOOTH EXTRACTION         Family History: family history includes Diabetes in her maternal grandfather and maternal grandmother. Otherwise pertinent FHx was reviewed and not pertinent to current issue.    Social History:  reports that she has never smoked. She has never used smokeless tobacco. She reports that she does not currently use alcohol after a past usage of about 1.0 standard drink of alcohol per week. She reports that she does not use drugs.    Home Medications:  prenatal vitamins    Allergies:  No Known Allergies    Objective   Objective     Vitals:   BP: (120)/(74) 120/74    Physical Exam     General: Well Developed, Well Nourished, not in acute distress   HEENT: normocephalic, atraumatic, conjunctiva non-icteric    Respiratory: non-labored, symmetrical chest rise   Gastrointestinal: gravid, soft, no TTP, no rebound tenderness or guarding to palpation, no palpable ctx   Neurologic: alert and oriented, CN II-XII grossly intact without focal deficit, DTRs 2+ lower extremities, no clonus   Psychiatric: normal mood, pleasant, cooperative  Musculoskeletal: negative calf tenderness, no lower extremity edema  Skin: warm & dry, no cyanosis, no jaundice    Pelvic Exam:  *Consent to pelvic exam obtained verbally from the patient. RN chaperone present during the exam.  Vagina: No lesions, normal physiologic appearing discharge, no pooling/bleeding or clots, cervix visually closed  Uterus: Appropriate for gestational age, nontender, no palpable contractions   Cervix: fingertip/20/-3    Imaging  US on 4/2: cephalic, right lateral placenta    EFW (3/26): 2459 g (17% - AC 13%)    Prenatal Labs  Lab Results   Component Value Date    HGB 13.1 02/07/2024    RUBELLAABIGG 1.10 09/27/2023    HEPBSAG Negative 01/30/2024    ABSCRN Negative 01/30/2024    HLO3MIA8 Non Reactive 01/30/2024    HEPCVIRUSABY Non Reactive  2024     2024    STREPGPB Negative 2024    URINECX 50,000 CFU/mL Normal Urogenital Bertha 2023    CHLAMNAA Positive (A) 2024    NGONORRHON Negative 2024         Assessment & Plan   Assessment / Plan     Dianna Damon is a 28 y.o. year old  with an Estimated Date of Delivery: 24 presenting for elective induction at 39w1d.    Pregnancy  GBS negative  H/o vacuum assisted delivery  Depression on medications  Varicella non-immune  O negative s/p rhogam  H/o gonorrhea/chlamydia in pregnancy in 1st and 3rd trimester    PLAN  -admit to L&D  -admit labs  -epidural at patient request  -AROM PRN  -pitocin per protocl  -plan for     Risks and benefits of induction discussed. Patient understands that IOL increases the risk of  delivery over spontaneous labor, especially if the patient does not have a favorable cervix.       Lavelle Velasquez MD  2024  11:55 CDT

## 2024-04-16 NOTE — H&P (VIEW-ONLY)
[facility]  HISTORY AND PHYSICAL, OBGYN    Patient Name: Dianna Damon  : 1995  MRN: [id number]  Primary Care Physician:  Provider, No Known  Date of admission: (Not on file)    Subjective   Subjective     Chief Complaint:   Chief Complaint   Patient presents with    Routine Prenatal Visit     Patient here for routine obv, patient denies regular contractions, vaginal bleeding, leakage of fluid. Patient reports good fetal movement.          HPI: Dianna Damon is a 28 y.o. year old  with an Estimated Date of Delivery: 24 presenting for elective induction of labor on .     Prenatal care has been with Dr. Lavelle Velasquez  It has been complicated by:  GBS negative  H/o vacuum assisted delivery  Depression on medications  Varicella non-immune  O negative s/p rhogam  H/o gonorrhea/chlamydia in pregnancy in 1st and 3rd trimester    ROS:  All systems were reviewed and negative except for:   +lenard lugo contractions  -denies leakage of fluid or vaginal bleeding  +endorses appropriate fetal movement      Personal History     OB History    Para Term  AB Living   4 2 2 0 1 2   SAB IAB Ectopic Molar Multiple Live Births   1 0 0 0 0 2      # Outcome Date GA Lbr Curtis/2nd Weight Sex Type Anes PTL Lv   4 Current            3 SAB 10/10/20 5w0d          2 Term 19 39w2d 03:45 / 00:11 3005 g (6 lb 10 oz) M Vag-Spont EPI N ROLO      Birth Comments: 33cm - HC      Name: JEMMA DAMON      Apgar1: 8  Apgar5: 9   1 Term 18 39w1d / 00:31 3690 g (8 lb 2.2 oz) M Vag-Vacuum EPI N ROLO      Birth Comments: 33 cm; AGA      Name: JEMMA DAMON      Apgar1: 7  Apgar5: 9       Past Medical History:   Diagnosis Date    Depression 2023    Gonorrhea     + on 2018... - on 2018    Herpes     HSV1- valtrex prescribed    Recurrent pregnancy loss, antepartum condition or complication 10/08/2020    Urinary tract infection     Urogenital trichomoniasis     + on  3/29/2018 - on 5/17/2018       Past Surgical History:   Procedure Laterality Date    DENTAL PROCEDURE      WISDOM TOOTH EXTRACTION         Family History: family history includes Diabetes in her maternal grandfather and maternal grandmother. Otherwise pertinent FHx was reviewed and not pertinent to current issue.    Social History:  reports that she has never smoked. She has never used smokeless tobacco. She reports that she does not currently use alcohol after a past usage of about 1.0 standard drink of alcohol per week. She reports that she does not use drugs.    Home Medications:  prenatal vitamins    Allergies:  No Known Allergies    Objective   Objective     Vitals:   BP: (120)/(74) 120/74    Physical Exam     General: Well Developed, Well Nourished, not in acute distress   HEENT: normocephalic, atraumatic, conjunctiva non-icteric    Respiratory: non-labored, symmetrical chest rise   Gastrointestinal: gravid, soft, no TTP, no rebound tenderness or guarding to palpation, no palpable ctx   Neurologic: alert and oriented, CN II-XII grossly intact without focal deficit, DTRs 2+ lower extremities, no clonus   Psychiatric: normal mood, pleasant, cooperative  Musculoskeletal: negative calf tenderness, no lower extremity edema  Skin: warm & dry, no cyanosis, no jaundice    Pelvic Exam:  *Consent to pelvic exam obtained verbally from the patient. RN chaperone present during the exam.  Vagina: No lesions, normal physiologic appearing discharge, no pooling/bleeding or clots, cervix visually closed  Uterus: Appropriate for gestational age, nontender, no palpable contractions   Cervix: fingertip/20/-3    Imaging  US on 4/2: cephalic, right lateral placenta    EFW (3/26): 2459 g (17% - AC 13%)    Prenatal Labs  Lab Results   Component Value Date    HGB 13.1 02/07/2024    RUBELLAABIGG 1.10 09/27/2023    HEPBSAG Negative 01/30/2024    ABSCRN Negative 01/30/2024    [id number] Non Reactive 01/30/2024    HEPCVIRUSABY Non Reactive  2024     2024    STREPGPB Negative 2024    URINECX 50,000 CFU/mL Normal Urogenital Bertha 2023    CHLAMNAA Positive (A) 2024    NGONORRHON Negative 2024         Assessment & Plan   Assessment / Plan     Dianna Damon is a 28 y.o. year old  with an Estimated Date of Delivery: 24 presenting for elective induction at 39w1d.    Pregnancy  GBS negative  H/o vacuum assisted delivery  Depression on medications  Varicella non-immune  O negative s/p rhogam  H/o gonorrhea/chlamydia in pregnancy in 1st and 3rd trimester    PLAN  -admit to L&D  -admit labs  -epidural at patient request  -AROM PRN  -pitocin per protocl  -plan for     Risks and benefits of induction discussed. Patient understands that IOL increases the risk of  delivery over spontaneous labor, especially if the patient does not have a favorable cervix.       Lavelle Velasquez MD  2024  11:55 CDT

## 2024-04-16 NOTE — PROGRESS NOTES
No complaints. Endorses appropriate fetal movement. Denies regular contractions, leakage of fluid, vaginal bleeding or discharge. Desires induction.     Total weight gain: 4.647 kg (10 lb 3.9 oz)  Expected weight gain: 11.5 kg (25 lb)-16 kg (35 lb)    RN present for SVE.     Induction reviewed. Questions answered. Risks and benefits of induction discussed. Patient understands that IOL increases the risk of  delivery over spontaneous labor, especially if the patient does not have a favorable cervix.     Kick counts/Labor precautions  RTC for induction tomorrow morning.     Diagnoses and all orders for this visit:    1. 39 weeks gestation of pregnancy (Primary)  -     POC Urinalysis Dipstick  -     sodium chloride 0.9 % flush 10 mL  -     sodium chloride 0.9 % flush 10 mL  -     sodium chloride 0.9 % infusion 40 mL  -     lactated ringers bolus 1,000 mL  -     lactated ringers infusion  -     acetaminophen (TYLENOL) tablet 650 mg  -     butorphanol (STADOL) injection 1 mg  -     butorphanol (STADOL) injection 2 mg  -     ondansetron ODT (ZOFRAN-ODT) disintegrating tablet 4 mg  -     ondansetron (ZOFRAN) injection 4 mg  -     terbutaline (BRETHINE) injection 0.25 mg  -     Sod Citrate-Citric Acid (BICITRA) oral solution 30 mL  -     ibuprofen (ADVIL,MOTRIN) tablet 600 mg  -     HYDROmorphone PF (DILAUDID) injection 1 mg  -     methylergonovine (METHERGINE) injection 200 mcg  -     carboprost (HEMABATE) injection 250 mcg  -     miSOPROStol (CYTOTEC) tablet 800 mcg  -     ondansetron ODT (ZOFRAN-ODT) disintegrating tablet 4 mg  -     ondansetron (ZOFRAN) injection 4 mg  -     calcium carbonate (TUMS) chewable tablet 500 mg (200 mg elemental)  -     oxytocin (PITOCIN) 30 units in 0.9% sodium chloride 500 mL (premix)  -     oxytocin (PITOCIN) 30 units in 0.9% sodium chloride 500 mL (premix)  -     oxytocin (PITOCIN) 30 units in 0.9% sodium chloride 500 mL (premix)    Other orders  -     Admit To Obstetrics  Inpatient; Standing  -     Code Status and Medical Interventions:; Standing  -     Obtain informed consent; Standing  -     Vital Signs q 4 while awake; Standing  -     Vital Signs Per Hospital Policy; Standing  -     Confirm presence of amniotic fluid; Standing  -     Continuous Fetal Monitoring With NST on Admission and Prior to Initiation of Oxytocin.; Standing  -     External Uterine Contraction Monitoring; Standing  -     Notify Physician (specified); Standing  -     Notify physician for tachysystole (per hospital algorithm); Standing  -     Notify physician if membranes ruptured, bleeding greater than 1 pad an hour, fetal heart tone abnormality, and severe pain; Standing  -     Up Ad Rosalinda; Standing  -     Intake and Output; Standing  -     Cervical Exam; Standing  -     Initiate Group Beta Strep (GBS) Prophylaxis Protocol, If Criteria Met; Standing  -     Position Change - For Intra-Uterine Resusitation for Hypertonus, HyperStimulation or Non-Reassuring Fetal Status; Standing  -     NPO Diet NPO Type: Ice Chips; Standing  -     Inpatient Anesthesiology Consult; Standing  -     CBC (No Diff); Standing  -     T Pallidum Antibody w/ reflex RPR (Syphilis); Standing  -     Type & Screen; Standing  -     Insert Peripheral IV; Standing  -     Saline Lock & Maintain IV Access; Standing  -     Notify Physician (specified); Standing  -     Vital Signs Per Hospital Policy; Standing  -     Up Ad Rosalinda; Standing  -     Strict Intake and Output; Standing  -     Fundal & Lochia Check; Standing  -     Fundal & Lochia Check; Standing  -     Apply Ice to Perineum; Standing  -     Bladder Assessment; Standing  -     Diet: Regular/House; Fluid Consistency: Thin (IDDSI 0); Standing  -     Maintain Sequential Compression Device; Standing  -     Place Sequential Compression Device; Standing

## 2024-04-17 ENCOUNTER — HOSPITAL ENCOUNTER (INPATIENT)
Facility: HOSPITAL | Age: 29
LOS: 2 days | Discharge: HOME | End: 2024-04-19
Attending: OBSTETRICS & GYNECOLOGY | Admitting: OBSTETRICS & GYNECOLOGY
Payer: COMMERCIAL

## 2024-04-17 ENCOUNTER — ANESTHESIA EVENT (OUTPATIENT)
Dept: LABOR AND DELIVERY | Facility: HOSPITAL | Age: 29
End: 2024-04-17
Payer: COMMERCIAL

## 2024-04-17 ENCOUNTER — ANESTHESIA (OUTPATIENT)
Dept: LABOR AND DELIVERY | Facility: HOSPITAL | Age: 29
End: 2024-04-17
Payer: COMMERCIAL

## 2024-04-17 ENCOUNTER — HOSPITAL ENCOUNTER (OUTPATIENT)
Dept: LABOR AND DELIVERY | Facility: HOSPITAL | Age: 29
Discharge: HOME | End: 2024-04-17
Payer: COMMERCIAL

## 2024-04-17 DIAGNOSIS — Z3A.39 39 WEEKS GESTATION OF PREGNANCY: ICD-10-CM

## 2024-04-17 PROBLEM — R82.81 PYURIA: Status: ACTIVE | Noted: 2024-04-17

## 2024-04-17 PROBLEM — Z34.90 ENCOUNTER FOR ELECTIVE INDUCTION OF LABOR: Status: ACTIVE | Noted: 2024-04-17

## 2024-04-17 LAB
ABO GROUP BLD: NORMAL
BACTERIA UR QL AUTO: ABNORMAL /HPF
BLD GP AB SCN SERPL QL: POSITIVE
C TRACH RRNA CVX QL NAA+PROBE: NOT DETECTED
DEPRECATED RDW RBC AUTO: 45.4 FL (ref 37–54)
ERYTHROCYTE [DISTWIDTH] IN BLOOD BY AUTOMATED COUNT: 13.3 % (ref 12.3–15.4)
HCT VFR BLD AUTO: 40.7 % (ref 34–46.6)
HGB BLD-MCNC: 13.6 G/DL (ref 12–15.9)
HYALINE CASTS UR QL AUTO: ABNORMAL /LPF
MCH RBC QN AUTO: 31.1 PG (ref 26.6–33)
MCHC RBC AUTO-ENTMCNC: 33.4 G/DL (ref 31.5–35.7)
MCV RBC AUTO: 93.1 FL (ref 79–97)
N GONORRHOEA RRNA SPEC QL NAA+PROBE: NOT DETECTED
PLATELET # BLD AUTO: 267 10*3/MM3 (ref 140–450)
PMV BLD AUTO: 10 FL (ref 6–12)
RBC # BLD AUTO: 4.37 10*6/MM3 (ref 3.77–5.28)
RBC # UR STRIP: ABNORMAL /HPF
REF LAB TEST METHOD: ABNORMAL
RESIDUAL RHIG DETECTED: NORMAL
RH BLD: NEGATIVE
SQUAMOUS #/AREA URNS HPF: ABNORMAL /HPF
T PALLIDUM IGG SER QL: NORMAL
T&S EXPIRATION DATE: NORMAL
WBC # UR STRIP: ABNORMAL /HPF
WBC NRBC COR # BLD AUTO: 10.17 10*3/MM3 (ref 3.4–10.8)

## 2024-04-17 PROCEDURE — 25810000003 LACTATED RINGERS SOLUTION: Performed by: OBSTETRICS & GYNECOLOGY

## 2024-04-17 PROCEDURE — 86900 BLOOD TYPING SEROLOGIC ABO: CPT | Performed by: OBSTETRICS & GYNECOLOGY

## 2024-04-17 PROCEDURE — P9612 CATHETERIZE FOR URINE SPEC: HCPCS

## 2024-04-17 PROCEDURE — 59410 OBSTETRICAL CARE: CPT | Performed by: OBSTETRICS & GYNECOLOGY

## 2024-04-17 PROCEDURE — 85027 COMPLETE CBC AUTOMATED: CPT | Performed by: OBSTETRICS & GYNECOLOGY

## 2024-04-17 PROCEDURE — 25010000002 CEFTRIAXONE PER 250 MG: Performed by: OBSTETRICS & GYNECOLOGY

## 2024-04-17 PROCEDURE — 87591 N.GONORRHOEAE DNA AMP PROB: CPT | Performed by: OBSTETRICS & GYNECOLOGY

## 2024-04-17 PROCEDURE — 25010000002 ONDANSETRON PER 1 MG: Performed by: OBSTETRICS & GYNECOLOGY

## 2024-04-17 PROCEDURE — 25010000002 FENTANYL CITRATE (PF) 100 MCG/2ML SOLUTION: Performed by: NURSE ANESTHETIST, CERTIFIED REGISTERED

## 2024-04-17 PROCEDURE — 25010000002 ROPIVACAINE PER 1 MG: Performed by: NURSE ANESTHETIST, CERTIFIED REGISTERED

## 2024-04-17 PROCEDURE — 51702 INSERT TEMP BLADDER CATH: CPT

## 2024-04-17 PROCEDURE — 86850 RBC ANTIBODY SCREEN: CPT | Performed by: OBSTETRICS & GYNECOLOGY

## 2024-04-17 PROCEDURE — 86780 TREPONEMA PALLIDUM: CPT | Performed by: OBSTETRICS & GYNECOLOGY

## 2024-04-17 PROCEDURE — 87491 CHLMYD TRACH DNA AMP PROBE: CPT | Performed by: OBSTETRICS & GYNECOLOGY

## 2024-04-17 PROCEDURE — 3E033VJ INTRODUCTION OF OTHER HORMONE INTO PERIPHERAL VEIN, PERCUTANEOUS APPROACH: ICD-10-PCS | Performed by: OBSTETRICS & GYNECOLOGY

## 2024-04-17 PROCEDURE — C1755 CATHETER, INTRASPINAL: HCPCS | Performed by: NURSE ANESTHETIST, CERTIFIED REGISTERED

## 2024-04-17 PROCEDURE — 86870 RBC ANTIBODY IDENTIFICATION: CPT | Performed by: OBSTETRICS & GYNECOLOGY

## 2024-04-17 PROCEDURE — 25810000003 LACTATED RINGERS PER 1000 ML: Performed by: OBSTETRICS & GYNECOLOGY

## 2024-04-17 PROCEDURE — 87086 URINE CULTURE/COLONY COUNT: CPT | Performed by: OBSTETRICS & GYNECOLOGY

## 2024-04-17 PROCEDURE — 86901 BLOOD TYPING SEROLOGIC RH(D): CPT | Performed by: OBSTETRICS & GYNECOLOGY

## 2024-04-17 PROCEDURE — 81001 URINALYSIS AUTO W/SCOPE: CPT | Performed by: OBSTETRICS & GYNECOLOGY

## 2024-04-17 RX ORDER — CITRIC ACID/SODIUM CITRATE 334-500MG
30 SOLUTION, ORAL ORAL ONCE AS NEEDED
Status: DISCONTINUED | OUTPATIENT
Start: 2024-04-17 | End: 2024-04-17 | Stop reason: HOSPADM

## 2024-04-17 RX ORDER — ONDANSETRON 2 MG/ML
4 INJECTION INTRAMUSCULAR; INTRAVENOUS EVERY 6 HOURS PRN
Status: DISCONTINUED | OUTPATIENT
Start: 2024-04-17 | End: 2024-04-17 | Stop reason: HOSPADM

## 2024-04-17 RX ORDER — OXYTOCIN/0.9 % SODIUM CHLORIDE 30/500 ML
999 PLASTIC BAG, INJECTION (ML) INTRAVENOUS ONCE
Status: DISCONTINUED | OUTPATIENT
Start: 2024-04-17 | End: 2024-04-17 | Stop reason: HOSPADM

## 2024-04-17 RX ORDER — CITRIC ACID/SODIUM CITRATE 334-500MG
30 SOLUTION, ORAL ORAL ONCE
Status: DISCONTINUED | OUTPATIENT
Start: 2024-04-17 | End: 2024-04-17 | Stop reason: HOSPADM

## 2024-04-17 RX ORDER — CARBOPROST TROMETHAMINE 250 UG/ML
250 INJECTION, SOLUTION INTRAMUSCULAR AS NEEDED
Status: DISCONTINUED | OUTPATIENT
Start: 2024-04-17 | End: 2024-04-17 | Stop reason: HOSPADM

## 2024-04-17 RX ORDER — FAMOTIDINE 10 MG/ML
20 INJECTION, SOLUTION INTRAVENOUS ONCE AS NEEDED
Status: DISCONTINUED | OUTPATIENT
Start: 2024-04-17 | End: 2024-04-17 | Stop reason: HOSPADM

## 2024-04-17 RX ORDER — SODIUM CHLORIDE 9 MG/ML
40 INJECTION, SOLUTION INTRAVENOUS AS NEEDED
Status: DISCONTINUED | OUTPATIENT
Start: 2024-04-17 | End: 2024-04-17 | Stop reason: HOSPADM

## 2024-04-17 RX ORDER — DOCUSATE SODIUM 100 MG/1
100 CAPSULE, LIQUID FILLED ORAL 2 TIMES DAILY
Status: DISCONTINUED | OUTPATIENT
Start: 2024-04-17 | End: 2024-04-19 | Stop reason: HOSPADM

## 2024-04-17 RX ORDER — SODIUM CHLORIDE 0.9 % (FLUSH) 0.9 %
1-10 SYRINGE (ML) INJECTION AS NEEDED
Status: DISCONTINUED | OUTPATIENT
Start: 2024-04-17 | End: 2024-04-19 | Stop reason: HOSPADM

## 2024-04-17 RX ORDER — FENTANYL CITRATE 50 UG/ML
INJECTION, SOLUTION INTRAMUSCULAR; INTRAVENOUS AS NEEDED
Status: DISCONTINUED | OUTPATIENT
Start: 2024-04-17 | End: 2024-04-17 | Stop reason: SURG

## 2024-04-17 RX ORDER — MISOPROSTOL 200 UG/1
800 TABLET ORAL ONCE
Status: COMPLETED | OUTPATIENT
Start: 2024-04-17 | End: 2024-04-17

## 2024-04-17 RX ORDER — TERBUTALINE SULFATE 1 MG/ML
0.25 INJECTION, SOLUTION SUBCUTANEOUS AS NEEDED
Status: DISCONTINUED | OUTPATIENT
Start: 2024-04-17 | End: 2024-04-17 | Stop reason: HOSPADM

## 2024-04-17 RX ORDER — HYDROXYZINE HYDROCHLORIDE 25 MG/1
50 TABLET, FILM COATED ORAL NIGHTLY PRN
Status: DISCONTINUED | OUTPATIENT
Start: 2024-04-17 | End: 2024-04-19 | Stop reason: HOSPADM

## 2024-04-17 RX ORDER — EPHEDRINE SULFATE 50 MG/ML
10 INJECTION, SOLUTION INTRAVENOUS
Status: DISCONTINUED | OUTPATIENT
Start: 2024-04-17 | End: 2024-04-17 | Stop reason: HOSPADM

## 2024-04-17 RX ORDER — EPHEDRINE SULFATE 50 MG/ML
INJECTION, SOLUTION INTRAVENOUS AS NEEDED
Status: DISCONTINUED | OUTPATIENT
Start: 2024-04-17 | End: 2024-04-17 | Stop reason: SURG

## 2024-04-17 RX ORDER — IBUPROFEN 600 MG/1
600 TABLET ORAL EVERY 6 HOURS PRN
Status: DISCONTINUED | OUTPATIENT
Start: 2024-04-17 | End: 2024-04-17 | Stop reason: HOSPADM

## 2024-04-17 RX ORDER — OXYTOCIN/0.9 % SODIUM CHLORIDE 30/500 ML
125 PLASTIC BAG, INJECTION (ML) INTRAVENOUS ONCE AS NEEDED
Status: DISCONTINUED | OUTPATIENT
Start: 2024-04-17 | End: 2024-04-19 | Stop reason: HOSPADM

## 2024-04-17 RX ORDER — IBUPROFEN 600 MG/1
600 TABLET ORAL EVERY 6 HOURS SCHEDULED
Status: DISCONTINUED | OUTPATIENT
Start: 2024-04-18 | End: 2024-04-19 | Stop reason: HOSPADM

## 2024-04-17 RX ORDER — PRENATAL VIT/IRON FUM/FOLIC AC 27MG-0.8MG
1 TABLET ORAL DAILY
Status: DISCONTINUED | OUTPATIENT
Start: 2024-04-18 | End: 2024-04-19 | Stop reason: HOSPADM

## 2024-04-17 RX ORDER — ROPIVACAINE HYDROCHLORIDE 2 MG/ML
INJECTION, SOLUTION EPIDURAL; INFILTRATION; PERINEURAL AS NEEDED
Status: DISCONTINUED | OUTPATIENT
Start: 2024-04-17 | End: 2024-04-17 | Stop reason: SURG

## 2024-04-17 RX ORDER — TRAMADOL HYDROCHLORIDE 50 MG/1
50 TABLET ORAL EVERY 6 HOURS PRN
Status: DISCONTINUED | OUTPATIENT
Start: 2024-04-17 | End: 2024-04-19 | Stop reason: HOSPADM

## 2024-04-17 RX ORDER — SODIUM CHLORIDE, SODIUM LACTATE, POTASSIUM CHLORIDE, CALCIUM CHLORIDE 600; 310; 30; 20 MG/100ML; MG/100ML; MG/100ML; MG/100ML
125 INJECTION, SOLUTION INTRAVENOUS CONTINUOUS
Status: DISCONTINUED | OUTPATIENT
Start: 2024-04-17 | End: 2024-04-17

## 2024-04-17 RX ORDER — LIDOCAINE HYDROCHLORIDE AND EPINEPHRINE 15; 5 MG/ML; UG/ML
INJECTION, SOLUTION EPIDURAL AS NEEDED
Status: DISCONTINUED | OUTPATIENT
Start: 2024-04-17 | End: 2024-04-17 | Stop reason: SURG

## 2024-04-17 RX ORDER — ACETAMINOPHEN 325 MG/1
650 TABLET ORAL EVERY 6 HOURS
Status: DISCONTINUED | OUTPATIENT
Start: 2024-04-17 | End: 2024-04-19 | Stop reason: HOSPADM

## 2024-04-17 RX ORDER — AZITHROMYCIN 250 MG/1
1000 TABLET, FILM COATED ORAL ONCE
Status: COMPLETED | OUTPATIENT
Start: 2024-04-17 | End: 2024-04-17

## 2024-04-17 RX ORDER — ONDANSETRON 4 MG/1
4 TABLET, ORALLY DISINTEGRATING ORAL EVERY 6 HOURS PRN
Status: DISCONTINUED | OUTPATIENT
Start: 2024-04-17 | End: 2024-04-17 | Stop reason: HOSPADM

## 2024-04-17 RX ORDER — ONDANSETRON 4 MG/1
4 TABLET, ORALLY DISINTEGRATING ORAL EVERY 8 HOURS PRN
Status: DISCONTINUED | OUTPATIENT
Start: 2024-04-17 | End: 2024-04-19 | Stop reason: HOSPADM

## 2024-04-17 RX ORDER — LIDOCAINE HYDROCHLORIDE 20 MG/ML
INJECTION, SOLUTION EPIDURAL; INFILTRATION; INTRACAUDAL; PERINEURAL AS NEEDED
Status: DISCONTINUED | OUTPATIENT
Start: 2024-04-17 | End: 2024-04-17 | Stop reason: SURG

## 2024-04-17 RX ORDER — CALCIUM CARBONATE 500 MG/1
2 TABLET, CHEWABLE ORAL 3 TIMES DAILY PRN
Status: DISCONTINUED | OUTPATIENT
Start: 2024-04-17 | End: 2024-04-17 | Stop reason: HOSPADM

## 2024-04-17 RX ORDER — SODIUM CHLORIDE 0.9 % (FLUSH) 0.9 %
10 SYRINGE (ML) INJECTION EVERY 12 HOURS SCHEDULED
Status: DISCONTINUED | OUTPATIENT
Start: 2024-04-17 | End: 2024-04-17 | Stop reason: HOSPADM

## 2024-04-17 RX ORDER — BISACODYL 10 MG
10 SUPPOSITORY, RECTAL RECTAL DAILY PRN
Status: DISCONTINUED | OUTPATIENT
Start: 2024-04-18 | End: 2024-04-19 | Stop reason: HOSPADM

## 2024-04-17 RX ORDER — SODIUM CHLORIDE 0.9 % (FLUSH) 0.9 %
10 SYRINGE (ML) INJECTION AS NEEDED
Status: DISCONTINUED | OUTPATIENT
Start: 2024-04-17 | End: 2024-04-17 | Stop reason: HOSPADM

## 2024-04-17 RX ORDER — ONDANSETRON 2 MG/ML
4 INJECTION INTRAMUSCULAR; INTRAVENOUS EVERY 6 HOURS PRN
Status: DISCONTINUED | OUTPATIENT
Start: 2024-04-17 | End: 2024-04-19 | Stop reason: HOSPADM

## 2024-04-17 RX ORDER — OXYTOCIN/0.9 % SODIUM CHLORIDE 30/500 ML
2-30 PLASTIC BAG, INJECTION (ML) INTRAVENOUS
Status: DISCONTINUED | OUTPATIENT
Start: 2024-04-17 | End: 2024-04-17 | Stop reason: HOSPADM

## 2024-04-17 RX ORDER — OXYTOCIN/0.9 % SODIUM CHLORIDE 30/500 ML
250 PLASTIC BAG, INJECTION (ML) INTRAVENOUS CONTINUOUS
Status: DISCONTINUED | OUTPATIENT
Start: 2024-04-17 | End: 2024-04-17

## 2024-04-17 RX ORDER — HYDROCORTISONE 25 MG/G
1 CREAM TOPICAL AS NEEDED
Status: DISCONTINUED | OUTPATIENT
Start: 2024-04-17 | End: 2024-04-19 | Stop reason: HOSPADM

## 2024-04-17 RX ORDER — METHYLERGONOVINE MALEATE 0.2 MG/ML
200 INJECTION INTRAVENOUS ONCE AS NEEDED
Status: DISCONTINUED | OUTPATIENT
Start: 2024-04-17 | End: 2024-04-17 | Stop reason: HOSPADM

## 2024-04-17 RX ORDER — ACETAMINOPHEN 325 MG/1
650 TABLET ORAL EVERY 4 HOURS PRN
Status: DISCONTINUED | OUTPATIENT
Start: 2024-04-17 | End: 2024-04-17 | Stop reason: HOSPADM

## 2024-04-17 RX ADMIN — Medication 2 MILLI-UNITS/MIN: at 06:45

## 2024-04-17 RX ADMIN — EPHEDRINE SULFATE 15 MG: 50 INJECTION INTRAVENOUS at 13:38

## 2024-04-17 RX ADMIN — ACETAMINOPHEN 650 MG: 325 TABLET, FILM COATED ORAL at 20:21

## 2024-04-17 RX ADMIN — ROPIVACAINE HYDROCHLORIDE 8 ML/HR: 2 INJECTION, SOLUTION EPIDURAL; INFILTRATION at 08:31

## 2024-04-17 RX ADMIN — SODIUM CHLORIDE, POTASSIUM CHLORIDE, SODIUM LACTATE AND CALCIUM CHLORIDE 125 ML/HR: 600; 310; 30; 20 INJECTION, SOLUTION INTRAVENOUS at 06:11

## 2024-04-17 RX ADMIN — ONDANSETRON 4 MG: 2 INJECTION INTRAMUSCULAR; INTRAVENOUS at 10:13

## 2024-04-17 RX ADMIN — FENTANYL CITRATE 100 MCG: 50 INJECTION, SOLUTION INTRAMUSCULAR; INTRAVENOUS at 13:26

## 2024-04-17 RX ADMIN — DOCUSATE SODIUM 100 MG: 100 CAPSULE, LIQUID FILLED ORAL at 20:21

## 2024-04-17 RX ADMIN — MISOPROSTOL 800 MCG: 200 TABLET ORAL at 15:56

## 2024-04-17 RX ADMIN — CEFTRIAXONE 1000 MG: 1 INJECTION, POWDER, FOR SOLUTION INTRAMUSCULAR; INTRAVENOUS at 06:45

## 2024-04-17 RX ADMIN — SODIUM CHLORIDE, POTASSIUM CHLORIDE, SODIUM LACTATE AND CALCIUM CHLORIDE 1000 ML: 600; 310; 30; 20 INJECTION, SOLUTION INTRAVENOUS at 07:53

## 2024-04-17 RX ADMIN — Medication 250 ML/HR: at 15:58

## 2024-04-17 RX ADMIN — HETASTARCH 500 ML: 6 INJECTION, SOLUTION INTRAVENOUS at 13:53

## 2024-04-17 RX ADMIN — LIDOCAINE HYDROCHLORIDE AND EPINEPHRINE 3 ML: 15; 5 INJECTION, SOLUTION EPIDURAL at 08:21

## 2024-04-17 RX ADMIN — EPHEDRINE SULFATE 10 MG: 50 INJECTION INTRAVENOUS at 13:37

## 2024-04-17 RX ADMIN — SODIUM CHLORIDE, POTASSIUM CHLORIDE, SODIUM LACTATE AND CALCIUM CHLORIDE 125 ML/HR: 600; 310; 30; 20 INJECTION, SOLUTION INTRAVENOUS at 13:34

## 2024-04-17 RX ADMIN — SODIUM CHLORIDE, POTASSIUM CHLORIDE, SODIUM LACTATE AND CALCIUM CHLORIDE 125 ML/HR: 600; 310; 30; 20 INJECTION, SOLUTION INTRAVENOUS at 08:30

## 2024-04-17 RX ADMIN — AZITHROMYCIN DIHYDRATE 1000 MG: 250 TABLET ORAL at 06:50

## 2024-04-17 RX ADMIN — ROPIVACAINE HYDROCHLORIDE 4 ML: 2 INJECTION, SOLUTION EPIDURAL; INFILTRATION at 08:26

## 2024-04-17 RX ADMIN — LIDOCAINE HYDROCHLORIDE 3 ML: 20 INJECTION, SOLUTION EPIDURAL; INFILTRATION; INTRACAUDAL; PERINEURAL at 13:26

## 2024-04-17 NOTE — PLAN OF CARE
Goal Outcome Evaluation:              Outcome Evaluation: Pt had a spontaneous vaginal delivery at 1543 with no complications. Fundus is firm, 1 below the umbilicus with scant bleeding. VSS stable. Pt denies pain at this time and transferred to .

## 2024-04-17 NOTE — L&D DELIVERY NOTE
[facility]   Vaginal Delivery Note    Patient Name: Dianna Damon  : 1995  MRN: [id number]    Date of Delivery: 2024     Diagnosis     Pre & Post-Delivery:  Intrauterine pregnancy at 39w1d  Labor status: Induced Onset of Labor     Encounter for elective induction of labor    Pregnancy     (normal spontaneous vaginal delivery)    Pyuria             Problem List    Transfer to Postpartum     Review the Delivery Report for details.     Delivery     Delivery: Vaginal, Spontaneous     YOB: 2023    Time of Birth:  Gestational Age 3:43 PM   39w1d     Anesthesia: Epidural     Delivering clinician: Lavelle Velasquez    Forceps?   No   Vacuum? No    Shoulder dystocia present: No        Delivery narrative:      The patient was noted to be completely dilated and effaced with the fetal head at the introitus. The fetal vertex was delivered cephalic spontaneously with maternal pushing efforts. No nuchal cord was identified . The right anterior shoulder was delivered atraumatically by maternal expulsive efforts with the assistance of routine downward traction. The posterior shoulder delivered with maternal expulsive efforts and routine upward traction. The remainder of the fetus delivered spontaneously. Upon delivery, the infant was noted to be vigorous and was passed to the mother's abdomen into the care of the awaiting Infant care team. Following a 60-second delay in cord clamping, the cord was clamped x2 and cut. Cord blood was obtained for analysis and blood gas analysis. During the third stage of labor, IV Pitocin was administered to enhance uterine contraction. The placenta delivered spontaneously, intact, with a three-vessel cord.  The cervix, vagina and perineum were inspected for lacerations. No lacerations. Hemostasis was confirmed. Cervix and vulva without lacerations. The uterine fundus was firm at the end of the procedure. At time of delivery, purulent fluid coming from the  "urethra. Bladder straight cath and urine sample sent for UA and culture. Cytotec 800 mcg was placed rectally. Mom and baby tolerated the delivery well. All needle, sponge, and instrument counts were noted to be correct x2 at the end of the procedure.         Infant     Findings: female  infant     Infant observations: Weight: No birth weight on file.   Length:   in  Observations/Comments:        Apgars: 8  @ 1 minute /    9  @ 5 minutes   =      Placenta & Cord         Placenta delivered  Spontaneous  at   4/17/2024  3:48 PM     Cord:   present.   Nuchal Cord?  no   Cord blood obtained: Yes    Cord gases obtained:  Yes    Cord gas results: Venous:  No results found for: \"PHCVEN\", \"BECVEN\"    Arterial:  No results found for: \"PHCART\", \"BECART\"     Repair     Episiotomy: None     No    Lacerations: No   Estimated Blood Loss:       Quantitative Blood Loss:    QBL from VAG DEL: 50 (04/17/24 1606)     Complications     none    Disposition     Mother to Mother Baby/Postpartum  in stable condition currently.  Baby to remains with mom  in stable condition currently.    Lavelle Velasquez MD  04/17/24  16:11 CDT        "

## 2024-04-17 NOTE — PROGRESS NOTES
Lavelle Velasquez MD  [facility]  [address]  [place] [address]  Office [phone]  Fax [phone]      [facility]  Dianna Damon  : 1995  MRN: [id number]  CSN: [id number]    Labor progress note    Subjective   She reports is having no problems and is comfortable with the epidural     Objective   Min/max vitals past 24 hours:  Temp  Min: 98.6 °F (37 °C)  Max: 98.8 °F (37.1 °C)   BP  Min: 106/60  Max: 133/77   Pulse  Min: 71  Max: 114   Resp  Min: 16  Max: 16        FHT's: Deceration present. Position changed. Anesthesia at bedside. FSE placed. NST with recovery back to baseline.    Cervix: was checked (by me): 6 cm / 70 % / -3   Contractions: regular every 2 minutes - external monitors used      Assessment   IUP at 39w1d  GBS negative  H/o vacuum assisted delivery  Depression on medications  Varicella non-immune  O negative s/p rhogam  H/o gonorrhea/chlamydia in pregnancy in 1st and 3rd trimester     Plan   Pitocin off for now. Restart in 30 minutes pending fetal status.   Allow labor to continue pending maternal and fetal status  Plan discussed with family and questions answered.  Understanding verbalized.    Lavelle Velasquez MD  2024  13:51 CDT

## 2024-04-17 NOTE — ANESTHESIA PREPROCEDURE EVALUATION
Anesthesia Evaluation     Patient summary reviewed and Nursing notes reviewed   no history of anesthetic complications:   NPO Solid Status: > 8 hours  NPO Liquid Status: > 8 hours           Airway   Mallampati: I  TM distance: >3 FB  Neck ROM: full  Dental    (+) poor dentition    Pulmonary - negative pulmonary ROS   Cardiovascular - negative cardio ROS        Neuro/Psych  (+) psychiatric history Depression and ADHD  GI/Hepatic/Renal/Endo - negative ROS     Musculoskeletal (-) negative ROS    Abdominal    Substance History      OB/GYN    (+) Pregnant        Other               Lab Results   Component Value Date    WBC 10.17 04/17/2024    HGB 13.6 04/17/2024    HCT 40.7 04/17/2024    MCV 93.1 04/17/2024     04/17/2024     Ruptured, afebrile         Anesthesia Plan    ASA 2     epidural       Anesthetic plan, risks, benefits, and alternatives have been provided, discussed and informed consent has been obtained with: patient.    Plan discussed with CRNA.

## 2024-04-17 NOTE — INTERVAL H&P NOTE
H&P reviewed. The patient was examined and there are no changes to the H&P. RN present. Cervix 1/50/-3. AROM attempted but without return of fluid. Continue pitocin per protocol. Epidural at patient request.

## 2024-04-17 NOTE — ANESTHESIA PROCEDURE NOTES
Labor Epidural      Patient reassessed immediately prior to procedure    Patient location during procedure: OB  Performed By  TATY/CATALINA: Debo Pelaez CRNA  Preanesthetic Checklist  Completed: patient identified, IV checked, site marked, risks and benefits discussed, surgical consent, monitors and equipment checked, pre-op evaluation and timeout performed  Prep:  Pt Position:sitting  Sterile Tech:cap, mask, gloves and sterile barrier  Prep:chlorhexidine gluconate and isopropyl alcohol  Monitoring:continuous pulse oximetry and blood pressure monitoring  Epidural Block Procedure:  Approach:midline  Guidance:landmark technique  Location:L4-L5  Needle Type:Tuohy  Needle Gauge:17 G  Loss of Resistance Medium: saline  Loss of Resistance: 7cm  Cath Depth at skin:12 cm  Paresthesia: none  Aspiration:negative  Test Dose:negative  Number of Attempts: 1  Post Assessment:  Dressing:occlusive dressing applied and secured with tape  Pt Tolerance:patient tolerated the procedure well with no apparent complications  Complications:no

## 2024-04-17 NOTE — PLAN OF CARE
Goal Outcome Evaluation:  Plan of Care Reviewed With: patient        Progress: improving  Outcome Evaluation: Patient arrived to LDR for scheduled elective IOL

## 2024-04-18 LAB
ABO GROUP BLD: NORMAL
BLD GP AB SCN SERPL QL: NEGATIVE
FETAL BLEED: NEGATIVE
HCT VFR BLD AUTO: 32.5 % (ref 34–46.6)
HGB BLD-MCNC: 11 G/DL (ref 12–15.9)
NUMBER OF DOSES: NORMAL
RH BLD: NEGATIVE

## 2024-04-18 PROCEDURE — 85461 HEMOGLOBIN FETAL: CPT | Performed by: OBSTETRICS & GYNECOLOGY

## 2024-04-18 PROCEDURE — 25010000002 CEFTRIAXONE PER 250 MG: Performed by: OBSTETRICS & GYNECOLOGY

## 2024-04-18 PROCEDURE — 86850 RBC ANTIBODY SCREEN: CPT

## 2024-04-18 PROCEDURE — 85014 HEMATOCRIT: CPT | Performed by: OBSTETRICS & GYNECOLOGY

## 2024-04-18 PROCEDURE — 86901 BLOOD TYPING SEROLOGIC RH(D): CPT | Performed by: OBSTETRICS & GYNECOLOGY

## 2024-04-18 PROCEDURE — 85018 HEMOGLOBIN: CPT | Performed by: OBSTETRICS & GYNECOLOGY

## 2024-04-18 PROCEDURE — 86900 BLOOD TYPING SEROLOGIC ABO: CPT | Performed by: OBSTETRICS & GYNECOLOGY

## 2024-04-18 PROCEDURE — 25010000002 RHO D IMMUNE GLOBULIN 1500 UNIT/2ML SOLUTION PREFILLED SYRINGE: Performed by: OBSTETRICS & GYNECOLOGY

## 2024-04-18 RX ADMIN — HUMAN RHO(D) IMMUNE GLOBULIN 1500 UNITS: 1500 SOLUTION INTRAMUSCULAR; INTRAVENOUS at 09:39

## 2024-04-18 RX ADMIN — DOCUSATE SODIUM 100 MG: 100 CAPSULE, LIQUID FILLED ORAL at 09:03

## 2024-04-18 RX ADMIN — IBUPROFEN 600 MG: 600 TABLET, FILM COATED ORAL at 12:31

## 2024-04-18 RX ADMIN — IBUPROFEN 600 MG: 600 TABLET, FILM COATED ORAL at 18:44

## 2024-04-18 RX ADMIN — DOCUSATE SODIUM 100 MG: 100 CAPSULE, LIQUID FILLED ORAL at 20:13

## 2024-04-18 RX ADMIN — CEFTRIAXONE 1000 MG: 1 INJECTION, POWDER, FOR SOLUTION INTRAMUSCULAR; INTRAVENOUS at 09:03

## 2024-04-18 RX ADMIN — PRENATAL VIT W/ FE FUMARATE-FA TAB 27-0.8 MG 1 TABLET: 27-0.8 TAB at 09:03

## 2024-04-18 RX ADMIN — ACETAMINOPHEN 650 MG: 325 TABLET, FILM COATED ORAL at 20:13

## 2024-04-18 RX ADMIN — ACETAMINOPHEN 650 MG: 325 TABLET, FILM COATED ORAL at 09:03

## 2024-04-18 NOTE — PROGRESS NOTES
"      KIANA Gonzalez  [facility]  [address]  [place] [address]  Office [phone]  Fax [phone]    [facility]  Vaginal Delivery Progress Note    Subjective   Postpartum Day 1: Vaginal Delivery    The patient feels well but tired.  Her pain is managed with Tylenol and Motrin. She relates she is not really having much discomfort.   She is ambulating well.  Patient describes her bleeding as \"alright, period like.\"    Breastfeeding: declines.    Objective     Vital Signs Range for the last 24 hours  Temperature: Temp:  [97.5 °F (36.4 °C)-98.8 °F (37.1 °C)] 98.3 °F (36.8 °C)   Temp Source: Temp src: Temporal   BP: BP: ()/(40-81) 98/61   Pulse: Heart Rate:  [] 70   Respirations: Resp:  [16-18] 16   SPO2: SpO2:  [96 %-100 %] 97 %   O2 Amount (l/min):     O2 Devices Device (Oxygen Therapy): room air   Weight: Weight:  [65.9 kg (145 lb 4.8 oz)] 65.9 kg (145 lb 4.8 oz)     Admit Height:  Height: 157.5 cm (62\")      Physical Exam:  General:  no acute distresss.  Abdomen: abdomen is soft without significant tenderness, masses, organomegaly or guarding. Fundus: appropriate, firm, non tender  Extremities: normal, atraumatic, no cyanosis, and no edema.       Lab results reviewed:  Yes   Rubella:  No results found for: \"RUBELLAIGGIN\" Nurse Transcribed from prenatal record --  No components found for: \"EXTRUBELQUAL\"  Rh Status:    RH type   Date Value Ref Range Status   04/18/2024 Negative  Final     Immunizations:   Immunization History   Administered Date(s) Administered    DTaP 01/02/1997, 03/11/1997, 05/19/1997, 11/19/1997, 11/30/2000    DTaP, Unspecified 11/30/2000    HPV Quadrivalent 09/10/2013, 09/16/2014, 01/05/2016    Hepatitis B Adult/Adolescent IM 1995, 1995, 05/19/1997    HiB 01/02/1997, 03/04/1997, 05/19/1997, 11/19/1997    IPV 01/02/1997, 03/04/1997, 05/19/1997, 11/19/1997, 11/30/2000    Influenza LAIV (Nasal) 09/10/2013, 01/05/2016    MCV4 Unspecified 09/10/2013, " 01/05/2016    MMR 11/19/1997, 11/30/2000    Meningococcal MCV4P (Menactra) 09/10/2013, 01/05/2016    Rho (D) Immune Globulin 09/28/2018, 11/29/2018, 08/13/2019, 11/13/2019, 10/07/2020, 01/30/2024    Tdap 02/07/2008, 11/29/2018, 01/30/2024    Varicella 11/30/2018     Lab Results (last 24 hours)       Procedure Component Value Units Date/Time    Hemoglobin & Hematocrit, Blood [[id number]]  (Abnormal) Collected: 04/18/24 0508    Specimen: Blood Updated: 04/18/24 0552     Hemoglobin 11.0 g/dL      Hematocrit 32.5 %     Urinalysis With Culture If Indicated - Straight Cath [[id number]]  (Abnormal) Collected: 04/17/24 1553    Specimen: Urine from Straight Cath Updated: 04/17/24 1627     Color, UA Yellow     Appearance, UA Clear     pH, UA 8.0     Specific Gravity, UA 1.014     Glucose, UA Negative     Ketones, UA 40 mg/dL (2+)     Bilirubin, UA Negative     Blood, UA Small (1+)     Protein, UA Trace     Leuk Esterase, UA Trace     Nitrite, UA Negative     Urobilinogen, UA 1.0 E.U./dL    Narrative:      In absence of clinical symptoms, the presence of pyuria, bacteria, and/or nitrites on the urinalysis result does not correlate with infection.    Urinalysis, Microscopic Only - Straight Cath [[id number]]  (Abnormal) Collected: 04/17/24 1553    Specimen: Urine from Straight Cath Updated: 04/17/24 1627     RBC, UA 3-5 /HPF      WBC, UA 0-2 /HPF      Comment: Urine culture not indicated.        Bacteria, UA None Seen /HPF      Squamous Epithelial Cells, UA 0-2 /HPF      Hyaline Casts, UA None Seen /LPF      Methodology Manual Light Microscopy    Chlamydia trachomatis, Neisseria gonorrhoeae, PCR - Swab, Vagina [[id number]]  (Normal) Collected: 04/17/24 0643    Specimen: Swab from Vagina Updated: 04/17/24 1427     Chlamydia DNA by PCR Not Detected     Neisseria gonorrhoeae by PCR Not Detected    Narrative:      Disclaimer: The Aptima Combo 2 assay is a target amplification nucleic acid probe test that utilizes target capture for the  "in vitro qualitative detection and differentiation of ribosomal RNA from Chlamydia trachomatis and Neisseria gonorrhoeae to aid in the diagnosis of chlamydial and/or gonococcal urogenital disease.  Cell culture was once considered to be the \"gold standard\" for detection of CT and NG.  Culture is quite specific, but scientific studies have demonstrated that the NAAT DNA probe technologies have higher clinical sensitivities than culture.    T Pallidum Antibody w/ reflex RPR (Syphilis) [[id number]]  (Normal) Collected: 04/17/24 0610    Specimen: Blood Updated: 04/17/24 1229     Treponemal AB Total Non-Reactive            External Prenatal Results       Pregnancy Outside Results - Transcribed From Office Records - See Scanned Records For Details       Test Value Date Time    ABO  O  04/18/24 0508    Rh  Negative  04/18/24 0508    Antibody Screen  Negative  04/18/24 0508       Positive  04/17/24 0610       Negative  01/30/24 1013       Negative  09/27/23 1426       Negative  09/10/23 1418    Varicella IgG  <135 index 09/27/23 1426    Rubella  1.10 index 09/27/23 1426    Hgb  11.0 g/dL 04/18/24 0508       13.6 g/dL 04/17/24 0610       13.1 g/dL 02/07/24 1506       11.6 g/dL 01/30/24 1013       11.2 g/dL 11/12/23 1541       13.7 g/dL 09/27/23 1426       13.4 g/dL 09/10/23 1311       11.7 g/dL 07/27/23 1515    Hct  32.5 % 04/18/24 0508       40.7 % 04/17/24 0610       40.0 % 02/07/24 1506       33.8 % 11/12/23 1541       41.0 % 09/27/23 1426       40.3 % 09/10/23 1311       36.6 % 07/27/23 1515    Glucose Fasting GTT       Glucose Tolerance Test 1 hour       Glucose Tolerance Test 3 hour       Gonorrhea (discrete)  Not Detected  04/17/24 0643       Negative  03/26/24 1249       Negative  09/27/23 1426       Detected  07/27/23 1607    Chlamydia (discrete)  Not Detected  04/17/24 0643       Positive  03/26/24 1249       Negative  09/27/23 1426       Detected  07/27/23 1607    RPR  Non Reactive  01/30/24 1013       Non Reactive "  23 1426    VDRL       Syphilis Antibody       HBsAg  Negative  24 1013       Negative  23 1426    Herpes Simplex Virus PCR       Herpes Simplex VIrus Culture ^ HSV1&2+  19     HIV  Non Reactive  24 1013       Non Reactive  23 1426    Hep C RNA Quant PCR       Hep C Antibody  Non Reactive  24 1013       Non Reactive  23 1426    AFP       Group B Strep  Negative  24 1249    GBS Susceptibility to Clindamycin       GBS Susceptibility to Erythromycin       Fetal Fibronectin  Positive  10/10/18 1253    Genetic Testing, Maternal Blood                 Drug Screening       Test Value Date Time    Urine Drug Screen       Amphetamine Screen  Negative  10/30/22 1322    Barbiturate Screen  Negative  10/30/22 1322    Benzodiazepine Screen  Negative  10/30/22 1322    Methadone Screen  Negative  10/30/22 1322    Phencyclidine Screen  Negative  10/30/22 1322    Opiates Screen  Negative  10/30/22 1322    THC Screen  Negative  10/30/22 1322    Cocaine Screen       Propoxyphene Screen  Negative  10/30/22 1322    Buprenorphine Screen  Negative  10/30/22 1322    Methamphetamine Screen       Oxycodone Screen  Negative  10/30/22 1322    Tricyclic Antidepressants Screen  Negative  10/30/22 1322              Legend    ^: Historical                            Assessment & Plan       Encounter for elective induction of labor    Pregnancy     (normal spontaneous vaginal delivery)    Pyuria      Dianna A Bridges is Day 1  post-partum  Vaginal, Spontaneous   .      Plan:  Continue postpartum plan of care. Offer Varivax. Lactation consultation for milk suppression education.      This note has been signed electronically.    Nathalia Clark, DNP, APRN, CNM, RNC-OB  2024  07:38 CDT

## 2024-04-18 NOTE — ANESTHESIA POSTPROCEDURE EVALUATION
Patient: Dianna Damon    Procedure Summary       Date: 04/17/24 Room / Location:     Anesthesia Start: 0808 Anesthesia Stop: 1543    Procedure: LABOR ANALGESIA Diagnosis:     Scheduled Providers:  Provider: Debo Pelaez CRNA    Anesthesia Type: epidural ASA Status: 2            Anesthesia Type: epidural    Vitals  Vitals Value Taken Time   /62 04/18/24 1113   Temp 98.4 °F (36.9 °C) 04/18/24 1113   Pulse 68 04/18/24 1113   Resp 16 04/18/24 1113   SpO2 97 % 04/18/24 1113           Post Anesthesia Care and Evaluation    Patient location during evaluation: bedside  Patient participation: complete - patient participated  Level of consciousness: awake and alert  Pain management: adequate    Airway patency: patent  Anesthetic complications: No anesthetic complications  PONV Status: none  Cardiovascular status: acceptable  Respiratory status: acceptable  Hydration status: acceptable  Post Neuraxial Block status: Motor and sensory function returned to baseline and No signs or symptoms of PDPHNo anesthesia care post op

## 2024-04-18 NOTE — PLAN OF CARE
Goal Outcome Evaluation:           Progress: improving     VSS. FF, U, Scant to light. Voiding. Up ad phu to bathroom. Pt reports no pain. IV saline locked. Resting most of night. Bonding well with baby.

## 2024-04-18 NOTE — PLAN OF CARE
Goal Outcome Evaluation:              Outcome Evaluation: VSS, FF, ML, UU scant lochia, showered, IV removed, pt request, MD approved if pt agreed to IM abx in the am.  Denies pain

## 2024-04-18 NOTE — LACTATION NOTE
Mother desires formula feeding. Non-nursing mother's handout given and reviewed. Advised applying compressive bra and wearing for 1-2 weeks. Discussed signs of milk, s/s/tx of engorgement and mastitis. Questions denied. Encouragement and support provided.     Suppression of Lactation for Non-Nursing Mothers handout    If you choose not to breastfeed, please let us know if you have any questions about whether yours was the right choice for you and your family.  While there are a very few conditions where breastfeeding is not recommended, most uses surrounding breastfeeding can be managed with proper support.  We are here to hep and support you no matter how you choose to feed your baby.    To suppress further lactation and prevent milk from coming in-- as much as possible:  **Wear a good fitting support bra without an under wire (sports bras are good)   **Wear bra continuously day and night for about 1-2 weeks  **Avoid any kind of breast stimulation such as rubbing, warmth or massage.  ** While showering, try to stand with your back to the water. The warm water will     encourage milk flow.  **Cold compression may be applied for 20 minutes once per hour as needed.  **Anti-Inflammatory medications such as ibuprofen (Motrin) may help.  Ue per your doctors and/or manufacture instructions.  ** If you develop a fever greater than 101 F, especially if you also have flu- like symptoms and any areas of redness or swelling in your breasts, please call your physician. You may need treatment for a condition called mastitis.      The Many Benefits if Breastfeeding   Breastfeeding saves time  *Breastfeeding allows you to calm or feed your baby immediately, which leads to a happier baby who cries less  *There is nothing to buy, prepare, or maintain.There is nothing to clean or sterilize.  Breastfeeding builds a mothers confidence  *She knows all her baby needs to thrive is her!  Breastfeeding saves Money  *There is no formula to  buy and healthier breast fed babies have less medical costs  Healthy Mom/Healthy baby  * babies get sick less often, and when they do they are usually sick less severely and for a shorter time  * babies have fewer ear infections  * babies have fewer allergies  *Mothers who breastfeed have a lower risk for cancer, osteoporosis, anemia, high blood pressure, obesity, and Type ll diabetes  *Mothers miss less work days with sick babies  Breast fed babies have a better dental health  * babies have better jaw development which requires lest orthodontic work  *Breast milk does not promote cavities  * babies can nurse at night without worry of tooth decay  Breastfeeding allows a baby to reach his full IQ potential  *The longer a baby is breast fed, the better their brain development  Breast fed babies and moms are more relaxed  *The hormones released during breastfeeding have a calming effect on mothers  *Breastfeeding requires mom to take a break; this may help mom get more rest after delivery  *Breastfeeding is quicker than preparing formula which allows mom and baby to get back to sleep faster  *Breastfeeding promotes bonding and allows mom to learn babies cues and care needs more quickly  Breastfeeding cleanup is easier  *The bowel movements and spit up of breast fed babies doesn't smell as bad  *Spit-up of breast fed babies doesn't stain clothing  Getting out of the hourse is easier  *No formula bottles to prepare and carry safely   *No time restraints due to worry about what baby will eat  *No worries about warming a bottle or finding safe water to prepare bottles  Breastfeeding mother get their bodies back sooner  *The uterus shrinks more quickly and completely, which allows a flatter tummy  *Breastfeeding burns 400-500 calories a day; making milk torches stored fat!  Breastfeeding is better for the environment  *There is no trash to dispose of after breastfeeding  *There  is no production facility to produce breast milk; moms body does it all without the pollution of a factory      Your Guide to formula feeding your baby by [employer], [employer], [email or web address]

## 2024-04-19 ENCOUNTER — PATIENT OUTREACH (OUTPATIENT)
Dept: LABOR AND DELIVERY | Facility: HOSPITAL | Age: 29
End: 2024-04-19
Payer: COMMERCIAL

## 2024-04-19 VITALS
DIASTOLIC BLOOD PRESSURE: 66 MMHG | SYSTOLIC BLOOD PRESSURE: 111 MMHG | OXYGEN SATURATION: 96 % | TEMPERATURE: 98.3 F | HEIGHT: 62 IN | WEIGHT: 145.3 LBS | RESPIRATION RATE: 16 BRPM | HEART RATE: 60 BPM | BODY MASS INDEX: 26.74 KG/M2

## 2024-04-19 LAB
BASOPHILS # BLD AUTO: 0.11 10*3/MM3 (ref 0–0.2)
BASOPHILS NFR BLD AUTO: 0.7 % (ref 0–1.5)
BILIRUB UR QL STRIP: NEGATIVE
CLARITY UR: CLEAR
COLOR UR: YELLOW
DEPRECATED RDW RBC AUTO: 47.9 FL (ref 37–54)
EOSINOPHIL # BLD AUTO: 0.44 10*3/MM3 (ref 0–0.4)
EOSINOPHIL NFR BLD AUTO: 2.8 % (ref 0.3–6.2)
ERYTHROCYTE [DISTWIDTH] IN BLOOD BY AUTOMATED COUNT: 13.8 % (ref 12.3–15.4)
GLUCOSE UR STRIP-MCNC: NEGATIVE MG/DL
HCT VFR BLD AUTO: 37.2 % (ref 34–46.6)
HGB BLD-MCNC: 12.3 G/DL (ref 12–15.9)
HGB UR QL STRIP.AUTO: ABNORMAL
IMM GRANULOCYTES # BLD AUTO: 0.07 10*3/MM3 (ref 0–0.05)
IMM GRANULOCYTES NFR BLD AUTO: 0.4 % (ref 0–0.5)
KETONES UR QL STRIP: ABNORMAL
LEUKOCYTE ESTERASE UR QL STRIP.AUTO: ABNORMAL
LYMPHOCYTES # BLD AUTO: 3.58 10*3/MM3 (ref 0.7–3.1)
LYMPHOCYTES NFR BLD AUTO: 22.7 % (ref 19.6–45.3)
MCH RBC QN AUTO: 31.7 PG (ref 26.6–33)
MCHC RBC AUTO-ENTMCNC: 33.1 G/DL (ref 31.5–35.7)
MCV RBC AUTO: 95.9 FL (ref 79–97)
MONOCYTES # BLD AUTO: 0.96 10*3/MM3 (ref 0.1–0.9)
MONOCYTES NFR BLD AUTO: 6.1 % (ref 5–12)
NEUTROPHILS NFR BLD AUTO: 10.58 10*3/MM3 (ref 1.7–7)
NEUTROPHILS NFR BLD AUTO: 67.3 % (ref 42.7–76)
NITRITE UR QL STRIP: NEGATIVE
NRBC BLD AUTO-RTO: 0 /100 WBC (ref 0–0.2)
PH UR STRIP.AUTO: 8 [PH] (ref 5–8)
PLATELET # BLD AUTO: 243 10*3/MM3 (ref 140–450)
PMV BLD AUTO: 10.5 FL (ref 6–12)
PROT UR QL STRIP: ABNORMAL
RBC # BLD AUTO: 3.88 10*6/MM3 (ref 3.77–5.28)
SP GR UR STRIP: 1.01 (ref 1–1.03)
UROBILINOGEN UR QL STRIP: ABNORMAL
WBC NRBC COR # BLD AUTO: 15.74 10*3/MM3 (ref 3.4–10.8)

## 2024-04-19 PROCEDURE — 85025 COMPLETE CBC W/AUTO DIFF WBC: CPT | Performed by: ADVANCED PRACTICE MIDWIFE

## 2024-04-19 RX ORDER — IBUPROFEN 200 MG
600 TABLET ORAL EVERY 4 HOURS PRN
Start: 2024-04-19

## 2024-04-19 RX ORDER — ACETAMINOPHEN 325 MG/1
650 TABLET ORAL EVERY 4 HOURS PRN
Start: 2024-04-19

## 2024-04-19 NOTE — PLAN OF CARE
Problem: Adult Inpatient Plan of Care  Goal: Plan of Care Review  Outcome: Ongoing, Progressing  Flowsheets  Taken 4/19/2024 0140 by Radha Jiang, RN  Outcome Evaluation:   VSS: FFML UU with scant lochia   ambulating and voiding without difficulty   pain well controlled with scheduled PO pain meds, patient requested not to be woken up for scheduled meds this shift   stated she was worried about formula over the weekend if WIC wasn't started before D/C, instructed mom to call first thing in the morning so we can do anything to help, will pass along in report   SSC entered for resources   bonding well with baby  Taken 4/17/2024 0631 by Jessica Marquez, RN  Plan of Care Reviewed With: patient   Goal Outcome Evaluation:              Outcome Evaluation: VSS: FFML UU with scant lochia; ambulating and voiding without difficulty; pain well controlled with scheduled PO pain meds, patient requested not to be woken up for scheduled meds this shift; stated she was worried about formula over the weekend if WIC wasn't started before D/C, instructed mom to call first thing in the morning so we can do anything to help, will pass along in report; SSC entered for resources; bonding well with baby

## 2024-04-19 NOTE — OUTREACH NOTE
[facility]  Check-In    Current Estimated Gestational Age: 39w3d      Questions/Answers      Flowsheet Row Responses   Best Method for Contacting Cell          Dianna was discharged early 4/19/2024.  A postpartum check-in call is scheduled for 4/29/24.  [employer] message sent.   Kassi Parikh RN  Maternity Nurse Navigator    4/19/2024, 12:50 CDT

## 2024-04-19 NOTE — DISCHARGE SUMMARY
[facility]    Nathalia Clark, APRN  [address]  [place] [address]  [phone]      Discharge Summary     Alexi Damon  : 1995  MRN: [id number]  CSN: [id number]    Date of Admission: 2024   Date of Discharge:  2024   Delivering Physician: Lavelle Velasquez        Admission Diagnosis: Pregnancy [Z34.90]  Encounter for elective induction of labor [Z34.90]  Encounter for elective induction of labor [Z34.90]   Discharge Diagnosis: Pregnancy at 39w3d - delivered       Procedures: 2024  - Vaginal, Spontaneous       Hospital Course  Patient is a 28 y.o.  who at 39w3d had a vaginal birth.  Her postpartum course was without complications.  On PPD #2 she was ready for discharge.  She had normal lochia and pain well controlled with oral medications.    Infant  female  fetus weighing 3030 g (6 lb 10.9 oz)   Apgars -  8 @ 1 minute /  9 @ 5 minutes.    Discharge labs  Lab Results   Component Value Date    WBC 10.17 2024    HGB 11.0 (L) 2024    HCT 32.5 (L) 2024     2024       Discharge Medications     Discharge Medications        New Medications        Instructions Start Date   acetaminophen 325 MG tablet  Commonly known as: TYLENOL   650 mg, Oral, Every 4 Hours PRN      ibuprofen 200 MG tablet  Commonly known as: ADVIL,MOTRIN   600 mg, Oral, Every 4 Hours PRN             Continue These Medications        Instructions Start Date   prenatal vitamins 27-1 MG tablet tablet   1 tablet, Oral, Daily               External Prenatal Results       Pregnancy Outside Results - Transcribed From Office Records - See Scanned Records For Details       Test Value Date Time    ABO  O  24 0508    Rh  Negative  24 0508    Antibody Screen  Negative  24 0508       Positive  24 0610       Negative  24 1013       Negative  23 1426       Negative  09/10/23 1418    Varicella IgG  <135 index 23 1426     Rubella  1.10 index 09/27/23 1426    Hgb  11.0 g/dL 04/18/24 0508       13.6 g/dL 04/17/24 0610       13.1 g/dL 02/07/24 1506       11.6 g/dL 01/30/24 1013       11.2 g/dL 11/12/23 1541       13.7 g/dL 09/27/23 1426       13.4 g/dL 09/10/23 1311       11.7 g/dL 07/27/23 1515    Hct  32.5 % 04/18/24 0508       40.7 % 04/17/24 0610       40.0 % 02/07/24 1506       33.8 % 11/12/23 1541       41.0 % 09/27/23 1426       40.3 % 09/10/23 1311       36.6 % 07/27/23 1515    Glucose Fasting GTT       Glucose Tolerance Test 1 hour       Glucose Tolerance Test 3 hour       Gonorrhea (discrete)  Not Detected  04/17/24 0643       Negative  03/26/24 1249       Negative  09/27/23 1426       Detected  07/27/23 1607    Chlamydia (discrete)  Not Detected  04/17/24 0643       Positive  03/26/24 1249       Negative  09/27/23 1426       Detected  07/27/23 1607    RPR  Non Reactive  01/30/24 1013       Non Reactive  09/27/23 1426    VDRL       Syphilis Antibody       HBsAg  Negative  01/30/24 1013       Negative  09/27/23 1426    Herpes Simplex Virus PCR       Herpes Simplex VIrus Culture ^ HSV1&2+  05/17/19     HIV  Non Reactive  01/30/24 1013       Non Reactive  09/27/23 1426    Hep C RNA Quant PCR       Hep C Antibody  Non Reactive  01/30/24 1013       Non Reactive  09/27/23 1426    AFP       Group B Strep  Negative  03/26/24 1249    GBS Susceptibility to Clindamycin       GBS Susceptibility to Erythromycin       Fetal Fibronectin  Positive  10/10/18 1253    Genetic Testing, Maternal Blood                 Drug Screening       Test Value Date Time    Urine Drug Screen       Amphetamine Screen  Negative  10/30/22 1322    Barbiturate Screen  Negative  10/30/22 1322    Benzodiazepine Screen  Negative  10/30/22 1322    Methadone Screen  Negative  10/30/22 1322    Phencyclidine Screen  Negative  10/30/22 1322    Opiates Screen  Negative  10/30/22 1322    THC Screen  Negative  10/30/22 1322    Cocaine Screen       Propoxyphene Screen   Negative  10/30/22 1322    Buprenorphine Screen  Negative  10/30/22 1322    Methamphetamine Screen       Oxycodone Screen  Negative  10/30/22 1322    Tricyclic Antidepressants Screen  Negative  10/30/22 1322              Legend    ^: Historical                            Discharge Disposition Home or Self Care   Condition on Discharge: good   Follow-up: 6 weeks with Dr. Velasquez or NAKITA Clark       Plan for discharge reviewed with Dr. Ortega.    This note has been signed electronically.    Nathalia Clark, DNP, APRN, MIKOM, RNC-OB  4/19/2024

## 2024-04-19 NOTE — PROGRESS NOTES
"      KIANA Gonzalez  [facility]  [address]  [place] [address]  Office [phone]  Fax [phone]    [facility]  Vaginal Delivery Progress Note    Subjective   Postpartum Day 2: Vaginal Delivery    The patient feels well.  Her pain is well controlled with Tylenol and Motrin.   She is ambulating well.  Patient describes her bleeding as  getting lighter - \"I think it is almost done\" .    Breastfeeding: declines.    Objective     Vital Signs Range for the last 24 hours  Temperature: Temp:  [98.3 °F (36.8 °C)-98.4 °F (36.9 °C)] 98.3 °F (36.8 °C)   Temp Source: Temp src: Temporal   BP: BP: (101-116)/(55-66) 111/66   Pulse: Heart Rate:  [60-74] 60   Respirations: Resp:  [16-18] 16   SPO2: SpO2:  [96 %-97 %] 96 %   O2 Amount (l/min):     O2 Devices Device (Oxygen Therapy): room air   Weight:       Admit Height:  Height: 157.5 cm (62\")      Physical Exam:  General:  no acute distresss.  Abdomen: abdomen is soft without significant tenderness, masses, organomegaly or guarding. Fundus: appropriate, firm, non tender  Extremities: normal, atraumatic, no cyanosis, and trace edema.       Lab results reviewed:  Yes   Rubella:  No results found for: \"RUBELLAIGGIN\" Nurse Transcribed from prenatal record --  No components found for: \"EXTRUBELQUAL\"  Rh Status:    RH type   Date Value Ref Range Status   04/18/2024 Negative  Final     Immunizations:   Immunization History   Administered Date(s) Administered    DTaP 01/02/1997, 03/11/1997, 05/19/1997, 11/19/1997, 11/30/2000    DTaP, Unspecified 11/30/2000    HPV Quadrivalent 09/10/2013, 09/16/2014, 01/05/2016    Hepatitis B Adult/Adolescent IM 1995, 1995, 05/19/1997    HiB 01/02/1997, 03/04/1997, 05/19/1997, 11/19/1997    IPV 01/02/1997, 03/04/1997, 05/19/1997, 11/19/1997, 11/30/2000    Influenza LAIV (Nasal) 09/10/2013, 01/05/2016    MCV4 Unspecified 09/10/2013, 01/05/2016    MMR 11/19/1997, 11/30/2000    Meningococcal MCV4P (Menactra) 09/10/2013, " 01/05/2016    Rho (D) Immune Globulin 09/28/2018, 11/29/2018, 08/13/2019, 11/13/2019, 10/07/2020, 01/30/2024, 04/18/2024    Tdap 02/07/2008, 11/29/2018, 01/30/2024    Varicella 11/30/2018     Lab Results (last 24 hours)       Procedure Component Value Units Date/Time    Urine Culture - Urine, Straight Cath [[id number]] Collected: 04/17/24 1553    Specimen: Urine from Straight Cath Updated: 04/19/24 0749    Tissue Pathology Exam [[id number]] Collected: 04/17/24 1557    Specimen: Tissue from Placenta Updated: 04/18/24 1021            External Prenatal Results       Pregnancy Outside Results - Transcribed From Office Records - See Scanned Records For Details       Test Value Date Time    ABO  O  04/18/24 0508    Rh  Negative  04/18/24 0508    Antibody Screen  Negative  04/18/24 0508       Positive  04/17/24 0610       Negative  01/30/24 1013       Negative  09/27/23 1426       Negative  09/10/23 1418    Varicella IgG  <135 index 09/27/23 1426    Rubella  1.10 index 09/27/23 1426    Hgb  11.0 g/dL 04/18/24 0508       13.6 g/dL 04/17/24 0610       13.1 g/dL 02/07/24 1506       11.6 g/dL 01/30/24 1013       11.2 g/dL 11/12/23 1541       13.7 g/dL 09/27/23 1426       13.4 g/dL 09/10/23 1311       11.7 g/dL 07/27/23 1515    Hct  32.5 % 04/18/24 0508       40.7 % 04/17/24 0610       40.0 % 02/07/24 1506       33.8 % 11/12/23 1541       41.0 % 09/27/23 1426       40.3 % 09/10/23 1311       36.6 % 07/27/23 1515    Glucose Fasting GTT       Glucose Tolerance Test 1 hour       Glucose Tolerance Test 3 hour       Gonorrhea (discrete)  Not Detected  04/17/24 0643       Negative  03/26/24 1249       Negative  09/27/23 1426       Detected  07/27/23 1607    Chlamydia (discrete)  Not Detected  04/17/24 0643       Positive  03/26/24 1249       Negative  09/27/23 1426       Detected  07/27/23 1607    RPR  Non Reactive  01/30/24 1013       Non Reactive  09/27/23 1426    VDRL       Syphilis Antibody       HBsAg  Negative  01/30/24 1013        Negative  23 1426    Herpes Simplex Virus PCR       Herpes Simplex VIrus Culture ^ HSV1&2+  19     HIV  Non Reactive  24 1013       Non Reactive  23 1426    Hep C RNA Quant PCR       Hep C Antibody  Non Reactive  24 1013       Non Reactive  23 1426    AFP       Group B Strep  Negative  24 1249    GBS Susceptibility to Clindamycin       GBS Susceptibility to Erythromycin       Fetal Fibronectin  Positive  10/10/18 1253    Genetic Testing, Maternal Blood                 Drug Screening       Test Value Date Time    Urine Drug Screen       Amphetamine Screen  Negative  10/30/22 1322    Barbiturate Screen  Negative  10/30/22 1322    Benzodiazepine Screen  Negative  10/30/22 1322    Methadone Screen  Negative  10/30/22 1322    Phencyclidine Screen  Negative  10/30/22 1322    Opiates Screen  Negative  10/30/22 1322    THC Screen  Negative  10/30/22 1322    Cocaine Screen       Propoxyphene Screen  Negative  10/30/22 1322    Buprenorphine Screen  Negative  10/30/22 1322    Methamphetamine Screen       Oxycodone Screen  Negative  10/30/22 1322    Tricyclic Antidepressants Screen  Negative  10/30/22 1322              Legend    ^: Historical                            Assessment & Plan       Encounter for elective induction of labor    Pregnancy     (normal spontaneous vaginal delivery)    Pyuria      Dianna A Bridges is Day 2  post-partum  Vaginal, Spontaneous   .      Plan:  Continue current postpartum plan of care. Lactation suppression discussed. Discharge instructions provided, including reasons to call the office or return to the hospital.       Discharge reviewed with Dr. Ortega.     This note has been signed electronically.    Nathalia Clark, DNP, APRN, CNM, RNC-OB  2024  07:49 CDT

## 2024-04-19 NOTE — CASE MANAGEMENT/SOCIAL WORK
Order: Pt stated she was worried she would not have enough formula over weekend if WIC benefits weren't started tomorrow before D/C.  Spoke with pt that has already called the health department and she has appt. With them on Monday at 2PM. Spoke with RN, pt will be given enough to last until WIC appointment and then she will have access to get formula covered.

## 2024-04-19 NOTE — NURSING NOTE
RN to bedside to educate pt  on discharge instructions written and verbal.  Pt discharged home in stable condition.  No S/S of distress noted.

## 2024-04-20 LAB — BACTERIA SPEC AEROBE CULT: NO GROWTH

## 2024-04-29 ENCOUNTER — PATIENT OUTREACH (OUTPATIENT)
Dept: LABOR AND DELIVERY | Facility: HOSPITAL | Age: 29
End: 2024-04-29
Payer: COMMERCIAL

## 2024-04-29 NOTE — OUTREACH NOTE
[facility]  Postpartum Check-In    Questions/Answers      Flowsheet Row Responses   Visit Setting Telephone   Best Method for Contacting Cell   OB Discharge Note Reviewed  Reviewed   OB Discharge Navigator Reviewed  Reviewed   OB Discharge Medications Reviewed  Reviewed    discharged home with mother? Yes   Current Pain Levels 0-10 0   At Rest Pain Levels 0-10 0   Pain level with activity 0-10 0   Acceptable Pain Level 0-10 3   Verbalized Emotional State Acceptance   Family/Support Network Family   Level of Involvement in Care Attentive, Interactive, Supportive   Do you feel comfortable in your relationship with your baby? Yes   Have members of your household adjusted to your baby? Yes   Is the baby's father supportive and/or involved with the baby? Yes   How does your partner feel about the baby? Happy, Involved   Do you feel safe at home, school and work? Yes   Are you in a relationship with someone who threatens you or hurts you? No   Do you have the resources to keep yourself and your baby healthy and safe? Yes   Lochia (per patient report) Rubra   Amount Scant   Number of pads per day 3   Lochia Odor None   Is patient breastfeeding? No   How is breast suppression going? wearing support bra and almost gone.   Postpartum Depression Screening Education Education Provided   Doctor Appointments: Education Provided   Postpartum Care Education Education Provided   S & S to report Education Provided   Followup Appointments Made Yes   Well Child Visit Appointments Made Yes   Appointment Date 24   Provider/Agency Ron   Well Child Checkup Provider Name Callie   Well Child Check Up Date: 24   Umbilical Cord No reported signs or symptoms   Feeding Readiness Cues: Cooing, Crying, Eager, Energy for feeding, Finger Sucking   Infant Feeding Method Formula   Formula PO (mL) 2-3 oz   Formula/Expressed Milk frequency of feedings: every 3-4 hours   Number of wet diapers x 24 hours 10   Last BM x  24 hours 1   Emesis (Unmeasured Occurence) scant   What safe sleep surface is available? Bassinet   Are there stuffed animals, toys, pillows, quilts, blankets, wedges, positioners, bumpers or other loose bedding in the infant's sleeping environment? No   Where does the baby usually sleep? Bassinet   Does the baby ever share a sleep surface with a sibling, adult or pet? No   Does the baby ever share a sleep surface in a bed, couch, recliner or other? No   What position do you place your baby to sleep for naps? Back   What position do you place your baby to sleep at night Back            Review of Systems    Most Recent [place]  Depression Scale Score (EPDS)    Performed by a clinician: 0 (2024 12:56 PM)      5 Ps Screen  completed    Dianna is aware that the RN call center will be calling in 1-2 weeks.     Kassi Parikh RN  Maternity Nurse Navigator    2024, 13:01 CDT

## 2024-04-30 LAB
CYTO UR: NORMAL
LAB AP CASE REPORT: NORMAL
Lab: NORMAL
PATH REPORT.FINAL DX SPEC: NORMAL
PATH REPORT.GROSS SPEC: NORMAL

## 2024-05-06 ENCOUNTER — POSTPARTUM VISIT (OUTPATIENT)
Dept: OBSTETRICS AND GYNECOLOGY | Age: 29
End: 2024-05-06
Payer: COMMERCIAL

## 2024-05-06 VITALS
WEIGHT: 132 LBS | HEIGHT: 62 IN | BODY MASS INDEX: 24.29 KG/M2 | SYSTOLIC BLOOD PRESSURE: 114 MMHG | DIASTOLIC BLOOD PRESSURE: 72 MMHG

## 2024-05-06 DIAGNOSIS — Z30.09 ENCOUNTER FOR OTHER GENERAL COUNSELING OR ADVICE ON CONTRACEPTION: ICD-10-CM

## 2024-05-06 PROCEDURE — 0503F POSTPARTUM CARE VISIT: CPT | Performed by: OBSTETRICS & GYNECOLOGY

## 2024-05-06 RX ORDER — SERTRALINE HYDROCHLORIDE 25 MG/1
1 TABLET, FILM COATED ORAL DAILY
COMMUNITY
Start: 2024-04-26

## 2024-06-04 ENCOUNTER — POSTPARTUM VISIT (OUTPATIENT)
Dept: OBSTETRICS AND GYNECOLOGY | Age: 29
End: 2024-06-04
Payer: COMMERCIAL

## 2024-06-04 VITALS
BODY MASS INDEX: 24.66 KG/M2 | SYSTOLIC BLOOD PRESSURE: 118 MMHG | WEIGHT: 134 LBS | DIASTOLIC BLOOD PRESSURE: 70 MMHG | HEIGHT: 62 IN

## 2024-06-04 DIAGNOSIS — Z30.09 ENCOUNTER FOR OTHER GENERAL COUNSELING OR ADVICE ON CONTRACEPTION: ICD-10-CM

## 2024-06-04 DIAGNOSIS — R91.1 LUNG NODULE SEEN ON IMAGING STUDY: ICD-10-CM

## 2024-06-04 NOTE — PROGRESS NOTES
Lavelle Velasquez MD  [facility]  [address]  [place] [address]  Office [phone]  Fax [phone]      [facility]  Dianna Damon  : 1995  MRN: [id number]    Subjective   Subjective     Chief Complaint   Patient presents with    Postpartum Care     Patient here for postpartum care. Delivered vaginally on 24  Has questions about following up on the spot on her lung that was found during pregnancy.       History of Present Illness  Postpartum Visit  Patient is here for a postpartum visit. She is 6 weeks postpartum following a spontaneous vaginal delivery.     Pregnancy complicated by:   Pregnancy, 39w1d  GBS negative  H/o vacuum assisted delivery  Depression on medications  Varicella non-immune  O negative s/p rhogam  H/o gonorrhea/chlamydia in pregnancy in 1st and 3rd trimester.     Postpartum course has been uncomplicated.   Baby's course has been uncomplicated.     Baby is feeding by formula - denies breast complaints. Bleeding no bleeding. Bowel function is normal. Bladder function is normal. Patient is not sexually active. Contraception method is abstinence. Postpartum depression screening: negative.    Review of Systems   Constitutional:  Negative for activity change, appetite change, chills, fatigue and fever.   Respiratory:  Negative for cough and shortness of breath.    Cardiovascular:  Negative for chest pain and leg swelling.   Gastrointestinal:  Negative for abdominal pain, constipation, diarrhea, nausea and vomiting.   Genitourinary:  Negative for decreased urine volume, dysuria, frequency, menstrual problem, pelvic pain, urgency, vaginal bleeding, vaginal discharge and vaginal pain.   Psychiatric/Behavioral:  Negative for decreased concentration, dysphoric mood, self-injury, sleep disturbance and suicidal ideas. The patient is not nervous/anxious.    All other systems reviewed and are negative.         Objective    Objective     Vitals:   Visit Vitals  BP  "118/70   Ht 157.5 cm (62\")   Wt 60.8 kg (134 lb)   Breastfeeding No   BMI 24.51 kg/m²        Physical Exam  Vitals and nursing note reviewed.   Constitutional:       General: She is not in acute distress.     Appearance: Normal appearance. She is not ill-appearing.   HENT:      Head: Normocephalic and atraumatic.      Nose: No congestion or rhinorrhea.   Eyes:      General: No scleral icterus.        Right eye: No discharge.         Left eye: No discharge.      Extraocular Movements: Extraocular movements intact.      Conjunctiva/sclera: Conjunctivae normal.   Pulmonary:      Effort: Pulmonary effort is normal. No accessory muscle usage or respiratory distress.   Abdominal:      General: Abdomen is flat.      Palpations: Abdomen is soft.      Tenderness: There is no abdominal tenderness.   Musculoskeletal:      Right lower leg: No edema.      Left lower leg: No edema.   Skin:     General: Skin is warm and dry.      Coloration: Skin is not ashen, cyanotic or jaundiced.   Neurological:      General: No focal deficit present.      Mental Status: She is alert and oriented to person, place, and time.      Deep Tendon Reflexes: Reflexes normal.   Psychiatric:         Mood and Affect: Mood normal.         Behavior: Behavior is cooperative.           Result Review    Postpartum Depression: Low Risk  (2024)    [place]  Depression Scale     Last EPDS Total Score: 2     Last EPDS Self Harm Result: Not on file       CT Angiogram Chest (2024 18:38)   IMPRESSION:  1. No evidence of pulmonary thromboembolic disease. The thoracic aorta  is normal in caliber with no evidence of dissection or aneurysm.  2. 5 mm nodule in the left lower lobe warranting follow-up per  Fleischner criteria. There is bibasilar atelectasis and remote  granulomatous disease. The lungs are otherwise clear.        Assessment & Plan   Assessment / Plan     Diagnoses and all orders for this visit:    1. Postpartum follow-up " (Primary)  Comments:  meeting postpartum milestones    2. Lung nodule seen on imaging study  Comments:  CT ordered for follow-up  Orders:  -     CT Chest Low Dose Follow Up With Contrast; Future    3. Encounter for other general counseling or advice on contraception  Comments:  offered, she declines contraception at this time          Return in about 3 months (around 9/4/2024), or if symptoms worsen or fail to improve, for annual.      Lavelle Velasquez MD

## 2024-06-13 ENCOUNTER — HOSPITAL ENCOUNTER (OUTPATIENT)
Dept: CT IMAGING | Facility: HOSPITAL | Age: 29
Discharge: HOME | End: 2024-06-13
Admitting: OBSTETRICS & GYNECOLOGY
Payer: COMMERCIAL

## 2024-06-13 DIAGNOSIS — R91.1 LUNG NODULE SEEN ON IMAGING STUDY: ICD-10-CM

## 2024-06-13 PROCEDURE — 71250 CT THORAX DX C-: CPT

## 2024-09-19 ENCOUNTER — TELEPHONE (OUTPATIENT)
Dept: OBSTETRICS AND GYNECOLOGY | Age: 29
End: 2024-09-19

## 2024-09-19 NOTE — TELEPHONE ENCOUNTER
Caller: OCTAVIO CHRIS    Relationship:  SELF    Best call back number: [phone]    PATIENT CALLED REQUESTING TO CANCEL SAME DAY APPT.    Did the patient call AFTER the start time of their scheduled appointment?   NO    Was the patient's appointment rescheduled?  YES     Any additional information: PT NOT FEELING WELL

## 2024-10-08 ENCOUNTER — OFFICE VISIT (OUTPATIENT)
Dept: OBSTETRICS AND GYNECOLOGY | Age: 29
End: 2024-10-08
Payer: COMMERCIAL

## 2024-10-08 VITALS
BODY MASS INDEX: 23.41 KG/M2 | HEIGHT: 62 IN | DIASTOLIC BLOOD PRESSURE: 64 MMHG | WEIGHT: 127.2 LBS | SYSTOLIC BLOOD PRESSURE: 108 MMHG

## 2024-10-08 DIAGNOSIS — R39.89 BLADDER PAIN: Primary | ICD-10-CM

## 2024-10-08 DIAGNOSIS — Z34.90 EARLY STAGE OF PREGNANCY: ICD-10-CM

## 2024-10-08 LAB
BILIRUB BLD-MCNC: ABNORMAL MG/DL
CLARITY, POC: CLEAR
COLOR UR: YELLOW
GLUCOSE UR STRIP-MCNC: NEGATIVE MG/DL
KETONES UR QL: ABNORMAL
LEUKOCYTE EST, POC: ABNORMAL
NITRITE UR-MCNC: NEGATIVE MG/ML
PH UR: 5 [PH] (ref 5–8)
PROT UR STRIP-MCNC: ABNORMAL MG/DL
RBC # UR STRIP: NEGATIVE /UL
SP GR UR: 1.02 (ref 1–1.03)
UROBILINOGEN UR QL: ABNORMAL

## 2024-10-08 RX ORDER — TRAZODONE HYDROCHLORIDE 100 MG/1
TABLET ORAL
COMMUNITY
Start: 2024-09-11

## 2024-10-08 NOTE — PROGRESS NOTES
"    Lavelle Velasquez MD  [facility]  [address]  [place] [address]  Office [phone]  Fax [phone]      [facility]  Dianna Damon  : 1995  MRN: [id number]    Subjective   Subjective     Chief Complaint   Patient presents with    Urinary Tract Infection     Pt here for possible UTI. States she has been experiencing states she's having pain around bladder. Pt is pregnant, new ob appointment scheduled 10/26. Denies pain during urination.       History of Present Illness  Dianna Damon is a 28 y.o. female , , who comes to the office today for possible UTI. Suprapubic discomfort. Not too painful. Tylenol sparingly when it was bad. Not too much with urination. No odor. Frequency+. No blood or abnormal discharge. LMP 8. Pregnant test positive at home. LMP exact. GA 5w6d. New OB visit coming up.    Review of Systems   Genitourinary:  Positive for frequency and pelvic pain. Negative for decreased urine volume, difficulty urinating, dyspareunia, dysuria, enuresis, flank pain, genital sores, hematuria, menstrual problem, urgency, vaginal bleeding, vaginal discharge and vaginal pain.   All other systems reviewed and are negative.       The following portions of the patient's history were reviewed and updated as appropriate: allergies, current medications, past family history, past medical history, past social history, past surgical history, and problem list.    Objective    Objective     Vitals:   Visit Vitals  /64   Ht 157.5 cm (62\")   Wt 57.7 kg (127 lb 3.2 oz)   BMI 23.27 kg/m²        Physical Exam  Vitals reviewed.   Constitutional:       General: She is not in acute distress.     Appearance: Normal appearance. She is not ill-appearing.   HENT:      Head: Normocephalic and atraumatic.      Nose: No congestion or rhinorrhea.   Eyes:      General: No scleral icterus.        Right eye: No discharge.         Left eye: No discharge.      Extraocular Movements: Extraocular " movements intact.      Conjunctiva/sclera: Conjunctivae normal.   Pulmonary:      Effort: Pulmonary effort is normal. No accessory muscle usage or respiratory distress.   Abdominal:      General: Abdomen is flat.      Palpations: Abdomen is soft.      Tenderness: There is no abdominal tenderness.   Musculoskeletal:      Right lower leg: No edema.      Left lower leg: No edema.   Skin:     General: Skin is warm and dry.      Coloration: Skin is not ashen, cyanotic or jaundiced.   Neurological:      General: No focal deficit present.      Mental Status: She is alert and oriented to person, place, and time.   Psychiatric:         Mood and Affect: Mood normal.         Behavior: Behavior is cooperative.       Result Review    POC Urinalysis Dipstick (10/08/2024 14:33)   Leukocytes small, negative nitrite         Assessment & Plan   Assessment / Plan     Diagnoses and all orders for this visit:    1. Bladder pain (Primary)  -     POC Urinalysis Dipstick  -     Urine Culture - Urine, Urine, Clean Catch    2. Early stage of pregnancy        Discussion:   Check urine culture. myChart with results. Antibiotics pending culture.   ER precautions. Return for new ob visit.     Follow-up: Return in about 1 week (around 10/15/2024) for OB visit, OB .    Lavelle Velasquez MD

## 2024-10-18 ENCOUNTER — INITIAL PRENATAL (OUTPATIENT)
Age: 29
End: 2024-10-18
Payer: COMMERCIAL

## 2024-10-18 VITALS — BODY MASS INDEX: 24.14 KG/M2 | DIASTOLIC BLOOD PRESSURE: 62 MMHG | SYSTOLIC BLOOD PRESSURE: 104 MMHG | WEIGHT: 132 LBS

## 2024-10-18 DIAGNOSIS — O21.0 MORNING SICKNESS: ICD-10-CM

## 2024-10-18 DIAGNOSIS — Z3A.01 7 WEEKS GESTATION OF PREGNANCY: ICD-10-CM

## 2024-10-18 DIAGNOSIS — Z12.4 SCREENING FOR CERVICAL CANCER: ICD-10-CM

## 2024-10-18 DIAGNOSIS — O36.80X0 ENCOUNTER TO DETERMINE FETAL VIABILITY OF PREGNANCY, SINGLE OR UNSPECIFIED FETUS: Primary | ICD-10-CM

## 2024-10-18 DIAGNOSIS — Z34.81 MULTIGRAVIDA IN FIRST TRIMESTER: ICD-10-CM

## 2024-10-18 DIAGNOSIS — Z36.3 SCREENING, ANTENATAL, FOR MALFORMATION BY ULTRASOUND: ICD-10-CM

## 2024-10-18 DIAGNOSIS — Z71.85 IMMUNIZATION COUNSELING: ICD-10-CM

## 2024-10-18 DIAGNOSIS — O09.899 SHORT INTERVAL BETWEEN PREGNANCIES AFFECTING PREGNANCY, ANTEPARTUM: ICD-10-CM

## 2024-10-18 PROCEDURE — 87624 HPV HI-RISK TYP POOLED RSLT: CPT | Performed by: ADVANCED PRACTICE MIDWIFE

## 2024-10-18 PROCEDURE — 87625 HPV TYPES 16 & 18 ONLY: CPT | Performed by: ADVANCED PRACTICE MIDWIFE

## 2024-10-18 PROCEDURE — G0123 SCREEN CERV/VAG THIN LAYER: HCPCS | Performed by: ADVANCED PRACTICE MIDWIFE

## 2024-10-18 RX ORDER — PRENATAL VIT NO.126/IRON/FOLIC 28MG-0.8MG
TABLET ORAL DAILY
COMMUNITY

## 2024-10-18 NOTE — PATIENT INSTRUCTIONS
Doxylamine (Unisom) 1/2 or 1/4 tablet plus Vitamin B6  mg every 6 hours as needed for pregnancy nausea.  Take together for best result.

## 2024-10-21 LAB
ABO GROUP BLD: NORMAL
BACTERIA UR CULT: ABNORMAL
BASOPHILS # BLD AUTO: 0.1 X10E3/UL (ref 0–0.2)
BASOPHILS NFR BLD AUTO: 1 %
BLD GP AB SCN SERPL QL: NEGATIVE
C TRACH RRNA SPEC QL NAA+PROBE: NEGATIVE
EOSINOPHIL # BLD AUTO: 0.2 X10E3/UL (ref 0–0.4)
EOSINOPHIL NFR BLD AUTO: 1 %
ERYTHROCYTE [DISTWIDTH] IN BLOOD BY AUTOMATED COUNT: 12.5 % (ref 11.7–15.4)
HBA1C MFR BLD: 5.4 % (ref 4.8–5.6)
HBV SURFACE AG SERPL QL IA: NEGATIVE
HCT VFR BLD AUTO: 38.8 % (ref 34–46.6)
HCV AB SERPL QL IA: NORMAL
HCV IGG SERPL QL IA: NON REACTIVE
HGB BLD-MCNC: 12.7 G/DL (ref 11.1–15.9)
HIV 1+2 AB+HIV1 P24 AG SERPL QL IA: NON REACTIVE
IMM GRANULOCYTES # BLD AUTO: 0 X10E3/UL (ref 0–0.1)
IMM GRANULOCYTES NFR BLD AUTO: 0 %
LYMPHOCYTES # BLD AUTO: 2.7 X10E3/UL (ref 0.7–3.1)
LYMPHOCYTES NFR BLD AUTO: 25 %
MCH RBC QN AUTO: 30.7 PG (ref 26.6–33)
MCHC RBC AUTO-ENTMCNC: 32.7 G/DL (ref 31.5–35.7)
MCV RBC AUTO: 94 FL (ref 79–97)
MONOCYTES # BLD AUTO: 0.8 X10E3/UL (ref 0.1–0.9)
MONOCYTES NFR BLD AUTO: 7 %
N GONORRHOEA RRNA SPEC QL NAA+PROBE: NEGATIVE
NEUTROPHILS # BLD AUTO: 6.9 X10E3/UL (ref 1.4–7)
NEUTROPHILS NFR BLD AUTO: 66 %
PLATELET # BLD AUTO: 254 X10E3/UL (ref 150–450)
RBC # BLD AUTO: 4.14 X10E6/UL (ref 3.77–5.28)
RH BLD: NEGATIVE
RPR SER QL: NON REACTIVE
RUBV IGG SERPL IA-ACNC: 1.19 INDEX
T VAGINALIS RRNA SPEC QL NAA+PROBE: NEGATIVE
T4 FREE SERPL-MCNC: 1.46 NG/DL (ref 0.82–1.77)
TSH SERPL DL<=0.005 MIU/L-ACNC: 0.4 UIU/ML (ref 0.45–4.5)
VZV IGG SER QL IA: NON REACTIVE
WBC # BLD AUTO: 10.6 X10E3/UL (ref 3.4–10.8)

## 2024-10-22 ENCOUNTER — TELEPHONE (OUTPATIENT)
Dept: OBSTETRICS AND GYNECOLOGY | Age: 29
End: 2024-10-22
Payer: COMMERCIAL

## 2024-10-22 RX ORDER — METOCLOPRAMIDE 10 MG/1
10 TABLET ORAL EVERY 6 HOURS PRN
Qty: 30 TABLET | Refills: 1 | Status: SHIPPED | OUTPATIENT
Start: 2024-10-22

## 2024-10-22 NOTE — TELEPHONE ENCOUNTER
"Tried to call patient to discuss chest pain. When I called the number listed, it stated \"call could not go through\". Sending [facility] [employer] message.  "

## 2024-10-23 DIAGNOSIS — B95.1 GROUP B STREPTOCOCCUS URINARY TRACT INFECTION AFFECTING PREGNANCY IN FIRST TRIMESTER: Primary | ICD-10-CM

## 2024-10-23 DIAGNOSIS — O23.41 GROUP B STREPTOCOCCUS URINARY TRACT INFECTION AFFECTING PREGNANCY IN FIRST TRIMESTER: Primary | ICD-10-CM

## 2024-10-23 RX ORDER — AMOXICILLIN 500 MG/1
500 CAPSULE ORAL 2 TIMES DAILY
Qty: 20 CAPSULE | Refills: 0 | Status: SHIPPED | OUTPATIENT
Start: 2024-10-23

## 2024-10-24 LAB
GEN CATEG CVX/VAG CYTO-IMP: ABNORMAL
HPV I/H RISK 4 DNA CVX QL PROBE+SIG AMP: DETECTED
HPV16 DNA SPEC QL NAA+PROBE: NOT DETECTED
HPV18+45 E6+E7 MRNA CVX QL NAA+PROBE: NOT DETECTED
LAB AP CASE REPORT: ABNORMAL
LAB AP GYN ADDITIONAL INFORMATION: ABNORMAL
LAB AP GYN OTHER FINDINGS: ABNORMAL
Lab: ABNORMAL
PATH INTERP SPEC-IMP: ABNORMAL
STAT OF ADQ CVX/VAG CYTO-IMP: ABNORMAL

## 2024-10-27 PROBLEM — R87.610 ASCUS WITH POSITIVE HIGH RISK HPV CERVICAL: Status: ACTIVE | Noted: 2024-10-27

## 2024-10-27 PROBLEM — R87.810 ASCUS WITH POSITIVE HIGH RISK HPV CERVICAL: Status: ACTIVE | Noted: 2024-10-27

## 2024-10-28 ENCOUNTER — APPOINTMENT (OUTPATIENT)
Dept: GENERAL RADIOLOGY | Facility: HOSPITAL | Age: 29
End: 2024-10-28
Payer: COMMERCIAL

## 2024-10-28 ENCOUNTER — HOSPITAL ENCOUNTER (EMERGENCY)
Facility: HOSPITAL | Age: 29
Discharge: HOME | End: 2024-10-28
Attending: EMERGENCY MEDICINE | Admitting: EMERGENCY MEDICINE
Payer: COMMERCIAL

## 2024-10-28 VITALS
DIASTOLIC BLOOD PRESSURE: 67 MMHG | BODY MASS INDEX: 23.37 KG/M2 | RESPIRATION RATE: 18 BRPM | SYSTOLIC BLOOD PRESSURE: 116 MMHG | HEIGHT: 62 IN | TEMPERATURE: 97.6 F | HEART RATE: 81 BPM | OXYGEN SATURATION: 97 % | WEIGHT: 127 LBS

## 2024-10-28 DIAGNOSIS — R07.9 CHEST PAIN, UNSPECIFIED TYPE: ICD-10-CM

## 2024-10-28 DIAGNOSIS — N39.0 ACUTE UTI: Primary | ICD-10-CM

## 2024-10-28 LAB
ALBUMIN SERPL-MCNC: 4.2 G/DL (ref 3.5–5.2)
ALBUMIN/GLOB SERPL: 1.4 G/DL
ALP SERPL-CCNC: 66 U/L (ref 39–117)
ALT SERPL W P-5'-P-CCNC: 10 U/L (ref 1–33)
ANION GAP SERPL CALCULATED.3IONS-SCNC: 9 MMOL/L (ref 5–15)
AST SERPL-CCNC: 11 U/L (ref 1–32)
BACTERIA UR QL AUTO: ABNORMAL /HPF
BASOPHILS # BLD AUTO: 0.06 10*3/MM3 (ref 0–0.2)
BASOPHILS NFR BLD AUTO: 0.5 % (ref 0–1.5)
BILIRUB SERPL-MCNC: 0.2 MG/DL (ref 0–1.2)
BILIRUB UR QL STRIP: NEGATIVE
BUN SERPL-MCNC: 10 MG/DL (ref 6–20)
BUN/CREAT SERPL: 30.3 (ref 7–25)
CALCIUM SPEC-SCNC: 9.1 MG/DL (ref 8.6–10.5)
CHLORIDE SERPL-SCNC: 102 MMOL/L (ref 98–107)
CLARITY UR: ABNORMAL
CO2 SERPL-SCNC: 26 MMOL/L (ref 22–29)
COLOR UR: YELLOW
CREAT SERPL-MCNC: 0.33 MG/DL (ref 0.57–1)
DEPRECATED RDW RBC AUTO: 44.3 FL (ref 37–54)
EGFRCR SERPLBLD CKD-EPI 2021: 145 ML/MIN/1.73
EOSINOPHIL # BLD AUTO: 0.16 10*3/MM3 (ref 0–0.4)
EOSINOPHIL NFR BLD AUTO: 1.4 % (ref 0.3–6.2)
ERYTHROCYTE [DISTWIDTH] IN BLOOD BY AUTOMATED COUNT: 13.1 % (ref 12.3–15.4)
GLOBULIN UR ELPH-MCNC: 2.9 GM/DL
GLUCOSE SERPL-MCNC: 103 MG/DL (ref 65–99)
GLUCOSE UR STRIP-MCNC: NEGATIVE MG/DL
HCG INTACT+B SERPL-ACNC: NORMAL MIU/ML
HCT VFR BLD AUTO: 37.2 % (ref 34–46.6)
HGB BLD-MCNC: 12.3 G/DL (ref 12–15.9)
HGB UR QL STRIP.AUTO: NEGATIVE
HYALINE CASTS UR QL AUTO: ABNORMAL /LPF
IMM GRANULOCYTES # BLD AUTO: 0.04 10*3/MM3 (ref 0–0.05)
IMM GRANULOCYTES NFR BLD AUTO: 0.4 % (ref 0–0.5)
KETONES UR QL STRIP: NEGATIVE
LEUKOCYTE ESTERASE UR QL STRIP.AUTO: ABNORMAL
LIPASE SERPL-CCNC: 21 U/L (ref 13–60)
LYMPHOCYTES # BLD AUTO: 2.96 10*3/MM3 (ref 0.7–3.1)
LYMPHOCYTES NFR BLD AUTO: 26.1 % (ref 19.6–45.3)
MCH RBC QN AUTO: 30.4 PG (ref 26.6–33)
MCHC RBC AUTO-ENTMCNC: 33.1 G/DL (ref 31.5–35.7)
MCV RBC AUTO: 92.1 FL (ref 79–97)
MONOCYTES # BLD AUTO: 0.88 10*3/MM3 (ref 0.1–0.9)
MONOCYTES NFR BLD AUTO: 7.8 % (ref 5–12)
NEUTROPHILS NFR BLD AUTO: 63.8 % (ref 42.7–76)
NEUTROPHILS NFR BLD AUTO: 7.24 10*3/MM3 (ref 1.7–7)
NITRITE UR QL STRIP: NEGATIVE
NRBC BLD AUTO-RTO: 0 /100 WBC (ref 0–0.2)
PH UR STRIP.AUTO: 6.5 [PH] (ref 5–8)
PLATELET # BLD AUTO: 260 10*3/MM3 (ref 140–450)
PMV BLD AUTO: 9.9 FL (ref 6–12)
POTASSIUM SERPL-SCNC: 4.2 MMOL/L (ref 3.5–5.2)
PROT SERPL-MCNC: 7.1 G/DL (ref 6–8.5)
PROT UR QL STRIP: NEGATIVE
RBC # BLD AUTO: 4.04 10*6/MM3 (ref 3.77–5.28)
RBC # UR STRIP: ABNORMAL /HPF
REF LAB TEST METHOD: ABNORMAL
SODIUM SERPL-SCNC: 137 MMOL/L (ref 136–145)
SP GR UR STRIP: 1.02 (ref 1–1.03)
SQUAMOUS #/AREA URNS HPF: ABNORMAL /HPF
TROPONIN T SERPL HS-MCNC: <6 NG/L
UROBILINOGEN UR QL STRIP: ABNORMAL
WBC # UR STRIP: ABNORMAL /HPF
WBC NRBC COR # BLD AUTO: 11.34 10*3/MM3 (ref 3.4–10.8)

## 2024-10-28 PROCEDURE — 84484 ASSAY OF TROPONIN QUANT: CPT | Performed by: EMERGENCY MEDICINE

## 2024-10-28 PROCEDURE — 81001 URINALYSIS AUTO W/SCOPE: CPT | Performed by: EMERGENCY MEDICINE

## 2024-10-28 PROCEDURE — 99283 EMERGENCY DEPT VISIT LOW MDM: CPT

## 2024-10-28 PROCEDURE — 84702 CHORIONIC GONADOTROPIN TEST: CPT | Performed by: EMERGENCY MEDICINE

## 2024-10-28 PROCEDURE — 93005 ELECTROCARDIOGRAM TRACING: CPT

## 2024-10-28 PROCEDURE — 83690 ASSAY OF LIPASE: CPT | Performed by: EMERGENCY MEDICINE

## 2024-10-28 PROCEDURE — 85025 COMPLETE CBC W/AUTO DIFF WBC: CPT | Performed by: EMERGENCY MEDICINE

## 2024-10-28 PROCEDURE — 80053 COMPREHEN METABOLIC PANEL: CPT | Performed by: EMERGENCY MEDICINE

## 2024-10-28 RX ORDER — FAMOTIDINE 20 MG/1
20 TABLET, FILM COATED ORAL 2 TIMES DAILY
Qty: 20 TABLET | Refills: 0 | Status: SHIPPED | OUTPATIENT
Start: 2024-10-28 | End: 2024-10-28 | Stop reason: ALTCHOICE

## 2024-10-28 RX ORDER — CEFDINIR 300 MG/1
300 CAPSULE ORAL 2 TIMES DAILY
Qty: 10 CAPSULE | Refills: 0 | Status: SHIPPED | OUTPATIENT
Start: 2024-10-28 | End: 2024-11-02

## 2024-10-28 RX ORDER — SODIUM CHLORIDE 0.9 % (FLUSH) 0.9 %
10 SYRINGE (ML) INJECTION AS NEEDED
Status: DISCONTINUED | OUTPATIENT
Start: 2024-10-28 | End: 2024-10-28 | Stop reason: HOSPADM

## 2024-10-28 RX ORDER — FAMOTIDINE 10 MG
10 TABLET ORAL 2 TIMES DAILY
Qty: 20 TABLET | Refills: 0 | Status: SHIPPED | OUTPATIENT
Start: 2024-10-28

## 2024-10-29 LAB — DRUGS UR: NORMAL

## 2024-10-29 NOTE — ED PROVIDER NOTES
Subjective   History of Present Illness  Pt presents to the  with report of mid-lower chest pain for the past week.  Reports sometimes radiates to back.  Nothing worsens/improves pain.  No injuries.  No f/c.  Pt reports she has also had some lower abdominal pain.  Pt is 8wks pregnant.  Denies any vaginal bleeding/discharge.  No LE swelling/pain.  Pt did have confirmed IUP via US on 18Oct        Review of Systems   Constitutional:  Negative for chills and fever.   HENT:  Negative for congestion.    Respiratory:  Negative for cough and shortness of breath.    Cardiovascular:  Positive for chest pain. Negative for palpitations and leg swelling.   Gastrointestinal:  Positive for abdominal pain, nausea and vomiting. Negative for diarrhea.   Genitourinary:  Negative for dysuria.   Neurological:  Negative for syncope.   All other systems reviewed and are negative.      Past Medical History:   Diagnosis Date    ASCUS with positive high risk HPV cervical 10/27/2024    Depression 07/01/2023    Gonorrhea     + on 4/16/2018... - on 5/17/2018    Herpes     HSV1- valtrex prescribed    Recurrent pregnancy loss, antepartum condition or complication 10/08/2020    Urinary tract infection     Urogenital trichomoniasis     + on 3/29/2018 - on 5/17/2018       No Known Allergies    Past Surgical History:   Procedure Laterality Date    DENTAL PROCEDURE      WISDOM TOOTH EXTRACTION         Family History   Problem Relation Age of Onset    Diabetes Maternal Grandmother     Diabetes Maternal Grandfather     Breast cancer Neg Hx     Ovarian cancer Neg Hx     Uterine cancer Neg Hx     Colon cancer Neg Hx        Social History     Socioeconomic History    Marital status: Single   Tobacco Use    Smoking status: Never    Smokeless tobacco: Never   Vaping Use    Vaping status: Never Used   Substance and Sexual Activity    Alcohol use: Not Currently     Alcohol/week: 1.0 standard drink of alcohol     Types: 1 Drinks containing 0.5 oz of alcohol  per week    Drug use: No    Sexual activity: Yes     Partners: Male     Birth control/protection: None     Comment: 2 weeks           Objective   Physical Exam  Vitals and nursing note reviewed.   Constitutional:       General: She is not in acute distress.     Appearance: She is well-developed.   HENT:      Head: Normocephalic and atraumatic.   Eyes:      Extraocular Movements: Extraocular movements intact.      Pupils: Pupils are equal, round, and reactive to light.   Cardiovascular:      Rate and Rhythm: Normal rate and regular rhythm.      Heart sounds: Normal heart sounds.   Pulmonary:      Effort: Pulmonary effort is normal.      Breath sounds: Normal breath sounds.   Abdominal:      General: Bowel sounds are normal.      Palpations: Abdomen is soft.   Musculoskeletal:      Right lower leg: No edema.      Left lower leg: No edema.   Skin:     General: Skin is warm and dry.      Capillary Refill: Capillary refill takes less than 2 seconds.   Neurological:      Mental Status: She is alert.         Procedures           ED Course      Labs Reviewed   COMPREHENSIVE METABOLIC PANEL - Abnormal; Notable for the following components:       Result Value    Glucose 103 (*)     Creatinine 0.33 (*)     BUN/Creatinine Ratio 30.3 (*)     All other components within normal limits    Narrative:     GFR Normal >60  Chronic Kidney Disease <60  Kidney Failure <15     URINALYSIS W/ MICROSCOPIC IF INDICATED (NO CULTURE) - Abnormal; Notable for the following components:    Appearance, UA Turbid (*)     Leuk Esterase, UA Small (1+) (*)     All other components within normal limits   CBC WITH AUTO DIFFERENTIAL - Abnormal; Notable for the following components:    WBC 11.34 (*)     Neutrophils, Absolute 7.24 (*)     All other components within normal limits   URINALYSIS, MICROSCOPIC ONLY - Abnormal; Notable for the following components:    WBC, UA 6-10 (*)     Bacteria, UA 2+ (*)     Squamous Epithelial Cells, UA 13-20 (*)     All other  components within normal limits   LIPASE - Normal   SINGLE HS TROPONIN T - Normal    Narrative:     High Sensitive Troponin T Reference Range:  <14.0 ng/L- Negative Female for AMI  <22.0 ng/L- Negative Male for AMI  >=14 - Abnormal Female indicating possible myocardial injury.  >=22 - Abnormal Male indicating possible myocardial injury.   Clinicians would have to utilize clinical acumen, EKG, Troponin, and serial changes to determine if it is an Acute Myocardial Infarction or myocardial injury due to an underlying chronic condition.        HCG, QUANTITATIVE, PREGNANCY    Narrative:     HCG Ranges by Gestational Age    Females - non-pregnant premenopausal   </= 1mIU/mL HCG  Females - postmenopausal               </= 7mIU/mL HCG    3 Weeks         5.8 -    71.2 mIU/mL  4 Weeks         9.5 -     750 mIU/mL  5 Weeks         217 -   7,138 mIU/mL  6 Weeks         158 -  31,795 mIU/mL  7 Weeks       3,697 - 163,563 mIU/mL  8 Weeks      32,065 - 149,571 mIU/mL  9 Weeks      63,803 - 151,410 mIU/mL  10 Weeks     46,509 - 186,977 mIU/mL  12 Weeks     27,832 - 210,612 mIU/mL  14 Weeks     13,950 -  62,530 mIU/mL  15 Weeks     12,039 -  70,971 mIU/mL  16 Weeks      9,040 -  56,451 mIU/mL  17 Weeks      8,175 -  55,868 mIU/mL  18 Weeks      8,099 -  58,176 mIU/mL   CBC AND DIFFERENTIAL    Narrative:     The following orders were created for panel order CBC & Differential.  Procedure                               Abnormality         Status                     ---------                               -----------         ------                     CBC Auto Differential[[id number]]        Abnormal            Final result                 Please view results for these tests on the individual orders.     No orders to display                                                Medical Decision Making  Pt stable in EC - resting comfortably and in NAD.  No evid of SBI/sepsis.  Dbt PE/TAD/ACS.  Suspect that her chest discomfort may be GI in  nature - will give rx for pepcid.  Pt does have some findings s/o UTI.  NS abdomen.  Will give rx for omnicef.  No clinical evid of appy/obs    Amount and/or Complexity of Data Reviewed  Labs: ordered.    Risk  Prescription drug management.        Final diagnoses:   Acute UTI   Chest pain, unspecified type       ED Disposition  ED Disposition       ED Disposition   Discharge    Condition   Stable    Comment   --               Provider, No Known  [facility] [id number] [phone]               Medication List        New Prescriptions      cefdinir 300 MG capsule  Commonly known as: OMNICEF  Take 1 capsule by mouth 2 (Two) Times a Day for 5 days.     famotidine 10 MG tablet  Commonly known as: Zantac 360  Take 1 tablet by mouth 2 (Two) Times a Day.               Where to Get Your Medications        These medications were sent to [place], [place] - [address] [facility] - [phone]  - [phone] [address] [address]      Phone: [phone]   cefdinir 300 MG capsule  famotidine 10 MG tablet            Gualberto Pierre DO  10/28/24 2001       Gualberto Pierre DO  10/28/24 [id number]

## 2024-10-30 ENCOUNTER — REFERRAL TRIAGE (OUTPATIENT)
Dept: LABOR AND DELIVERY | Facility: HOSPITAL | Age: 29
End: 2024-10-30
Payer: COMMERCIAL

## 2024-10-31 PROBLEM — Z64.1 MULTIGRAVIDA: Status: ACTIVE | Noted: 2024-10-18

## 2024-10-31 PROBLEM — O09.899 SHORT INTERVAL BETWEEN PREGNANCIES AFFECTING PREGNANCY, ANTEPARTUM: Status: ACTIVE | Noted: 2024-10-18

## 2024-10-31 PROBLEM — Z34.90 ENCOUNTER FOR ELECTIVE INDUCTION OF LABOR: Status: RESOLVED | Noted: 2024-04-17 | Resolved: 2024-10-31

## 2024-10-31 PROBLEM — O09.899 SHORT INTERVAL BETWEEN PREGNANCIES AFFECTING PREGNANCY, ANTEPARTUM: Status: ACTIVE | Noted: 2024-10-31

## 2024-10-31 PROBLEM — Z34.90 PREGNANCY: Status: ACTIVE | Noted: 2024-10-18

## 2024-10-31 PROBLEM — R82.81 PYURIA: Status: RESOLVED | Noted: 2024-04-17 | Resolved: 2024-10-31

## 2024-11-03 LAB
QT INTERVAL: 342 MS
QTC INTERVAL: 425 MS

## 2024-11-05 RX ORDER — METOCLOPRAMIDE 10 MG/1
10 TABLET ORAL EVERY 6 HOURS PRN
Qty: 30 TABLET | Refills: 1 | Status: SHIPPED | OUTPATIENT
Start: 2024-11-05 | End: 2024-11-05

## 2024-11-05 RX ORDER — PROMETHAZINE HYDROCHLORIDE 25 MG/1
25 TABLET ORAL EVERY 6 HOURS PRN
Qty: 30 TABLET | Refills: 1 | Status: SHIPPED | OUTPATIENT
Start: 2024-11-05

## 2024-11-13 ENCOUNTER — ROUTINE PRENATAL (OUTPATIENT)
Dept: OBSTETRICS AND GYNECOLOGY | Age: 29
End: 2024-11-13
Payer: COMMERCIAL

## 2024-11-13 VITALS — SYSTOLIC BLOOD PRESSURE: 106 MMHG | DIASTOLIC BLOOD PRESSURE: 70 MMHG | WEIGHT: 127 LBS | BODY MASS INDEX: 23.23 KG/M2

## 2024-11-13 DIAGNOSIS — Z34.81 MULTIGRAVIDA IN FIRST TRIMESTER: ICD-10-CM

## 2024-11-13 DIAGNOSIS — O09.899 MATERNAL VARICELLA, NON-IMMUNE: ICD-10-CM

## 2024-11-13 DIAGNOSIS — Z36.8A ENCOUNTER FOR ANTENATAL SCREENING FOR OTHER GENETIC DEFECT: ICD-10-CM

## 2024-11-13 DIAGNOSIS — R87.610 ATYPICAL SQUAMOUS CELLS OF UNDETERMINED SIGNIFICANCE ON CYTOLOGIC SMEAR OF CERVIX (ASC-US): ICD-10-CM

## 2024-11-13 DIAGNOSIS — Z3A.10 10 WEEKS GESTATION OF PREGNANCY: Primary | ICD-10-CM

## 2024-11-13 DIAGNOSIS — O23.41 GROUP B STREPTOCOCCUS URINARY TRACT INFECTION AFFECTING PREGNANCY IN FIRST TRIMESTER: ICD-10-CM

## 2024-11-13 DIAGNOSIS — B95.1 GROUP B STREPTOCOCCUS URINARY TRACT INFECTION AFFECTING PREGNANCY IN FIRST TRIMESTER: ICD-10-CM

## 2024-11-13 DIAGNOSIS — O21.9 NAUSEA AND VOMITING IN PREGNANCY: ICD-10-CM

## 2024-11-13 DIAGNOSIS — Z28.39 MATERNAL VARICELLA, NON-IMMUNE: ICD-10-CM

## 2024-11-13 LAB
GLUCOSE UR STRIP-MCNC: NEGATIVE MG/DL
PROT UR STRIP-MCNC: NEGATIVE MG/DL

## 2024-11-13 RX ORDER — ONDANSETRON 4 MG/1
4 TABLET, ORALLY DISINTEGRATING ORAL EVERY 8 HOURS PRN
Qty: 21 TABLET | Refills: 3 | Status: SHIPPED | OUTPATIENT
Start: 2024-11-13

## 2024-11-13 NOTE — PROGRESS NOTES
Chief Complaint   Patient presents with    Routine Prenatal Visit     Here today for OBV.   Pap 10/18/24 ASCUS, HPV+ needs colpo.      Nausea/vomiting - having a difficulty time with keeping food down and antiemetics down. Phenergan not helping. Zofran helped last time.  Denies regular contractions, leakage of fluid, vaginal bleeding or discharge.    Total weight gain: 0 kg (0 lb)  Expected weight gain: 11.5 kg (25 lb)-16 kg (35 lb)  /70   Wt 57.6 kg (127 lb)   LMP 2024   BMI 23.23 kg/m²         Diagnoses and all orders for this visit:    1. 10 weeks gestation of pregnancy (Primary)  -     POC Urinalysis Dipstick    2. Group B Streptococcus urinary tract infection affecting pregnancy in first trimester    3. Maternal varicella, non-immune    4. Multigravida in first trimester    5. Atypical squamous cells of undetermined significance on cytologic smear of cervix (ASC-US)    6. Nausea and vomiting in pregnancy  -     ondansetron ODT (ZOFRAN-ODT) 4 MG disintegrating tablet; Place 1 tablet on the tongue Every 8 (Eight) Hours As Needed for Vomiting or Nausea.  Dispense: 21 tablet; Refill: 3    7. Encounter for  screening for other genetic defect  -     [facility] Prenatal Test: Chromosomes 13, 18, 21, X & Y: Triploidy 22Q.11.2 Deletion - Blood,      Trial of zofran  NIPT - desires, reveal planned  RTC 4 weeks

## 2024-11-20 ENCOUNTER — HOSPITAL ENCOUNTER (EMERGENCY)
Facility: HOSPITAL | Age: 29
Discharge: HOME | End: 2024-11-20
Payer: COMMERCIAL

## 2024-11-20 VITALS
WEIGHT: 131 LBS | OXYGEN SATURATION: 97 % | RESPIRATION RATE: 16 BRPM | BODY MASS INDEX: 24.11 KG/M2 | TEMPERATURE: 98.2 F | SYSTOLIC BLOOD PRESSURE: 111 MMHG | HEIGHT: 62 IN | DIASTOLIC BLOOD PRESSURE: 66 MMHG | HEART RATE: 86 BPM

## 2024-11-20 DIAGNOSIS — O23.41 URINARY TRACT INFECTION IN MOTHER DURING FIRST TRIMESTER OF PREGNANCY: Primary | ICD-10-CM

## 2024-11-20 DIAGNOSIS — K04.7 TOOTH INFECTION: Primary | ICD-10-CM

## 2024-11-20 DIAGNOSIS — O21.0 HYPEREMESIS GRAVIDARUM: ICD-10-CM

## 2024-11-20 DIAGNOSIS — R80.9 PROTEINURIA, UNSPECIFIED TYPE: ICD-10-CM

## 2024-11-20 LAB
ALBUMIN SERPL-MCNC: 4 G/DL (ref 3.5–5.2)
ALBUMIN/GLOB SERPL: 1.4 G/DL
ALP SERPL-CCNC: 64 U/L (ref 39–117)
ALT SERPL W P-5'-P-CCNC: 12 U/L (ref 1–33)
AMORPH URATE CRY URNS QL MICRO: ABNORMAL /HPF
ANION GAP SERPL CALCULATED.3IONS-SCNC: 8 MMOL/L (ref 5–15)
AST SERPL-CCNC: 11 U/L (ref 1–32)
BACTERIA UR QL AUTO: ABNORMAL /HPF
BASOPHILS # BLD AUTO: 0.05 10*3/MM3 (ref 0–0.2)
BASOPHILS NFR BLD AUTO: 0.4 % (ref 0–1.5)
BILIRUB SERPL-MCNC: 0.2 MG/DL (ref 0–1.2)
BILIRUB UR QL STRIP: NEGATIVE
BUN SERPL-MCNC: 6 MG/DL (ref 6–20)
BUN/CREAT SERPL: 14.3 (ref 7–25)
CALCIUM SPEC-SCNC: 8.8 MG/DL (ref 8.6–10.5)
CHLORIDE SERPL-SCNC: 103 MMOL/L (ref 98–107)
CLARITY UR: ABNORMAL
CO2 SERPL-SCNC: 26 MMOL/L (ref 22–29)
COD CRY URNS QL: ABNORMAL /HPF
COLOR UR: ABNORMAL
CREAT SERPL-MCNC: 0.42 MG/DL (ref 0.57–1)
DEPRECATED RDW RBC AUTO: 43.3 FL (ref 37–54)
EGFRCR SERPLBLD CKD-EPI 2021: 136.8 ML/MIN/1.73
EOSINOPHIL # BLD AUTO: 0.11 10*3/MM3 (ref 0–0.4)
EOSINOPHIL NFR BLD AUTO: 0.9 % (ref 0.3–6.2)
ERYTHROCYTE [DISTWIDTH] IN BLOOD BY AUTOMATED COUNT: 13.3 % (ref 12.3–15.4)
GLOBULIN UR ELPH-MCNC: 2.8 GM/DL
GLUCOSE SERPL-MCNC: 66 MG/DL (ref 65–99)
GLUCOSE UR STRIP-MCNC: ABNORMAL MG/DL
HCT VFR BLD AUTO: 34.9 % (ref 34–46.6)
HGB BLD-MCNC: 12.1 G/DL (ref 12–15.9)
HGB UR QL STRIP.AUTO: NEGATIVE
HYALINE CASTS UR QL AUTO: ABNORMAL /LPF
IMM GRANULOCYTES # BLD AUTO: 0.06 10*3/MM3 (ref 0–0.05)
IMM GRANULOCYTES NFR BLD AUTO: 0.5 % (ref 0–0.5)
KETONES UR QL STRIP: ABNORMAL
LEUKOCYTE ESTERASE UR QL STRIP.AUTO: ABNORMAL
LIPASE SERPL-CCNC: 17 U/L (ref 13–60)
LYMPHOCYTES # BLD AUTO: 2.33 10*3/MM3 (ref 0.7–3.1)
LYMPHOCYTES NFR BLD AUTO: 18.3 % (ref 19.6–45.3)
MAGNESIUM SERPL-MCNC: 1.8 MG/DL (ref 1.6–2.6)
MCH RBC QN AUTO: 30.9 PG (ref 26.6–33)
MCHC RBC AUTO-ENTMCNC: 34.7 G/DL (ref 31.5–35.7)
MCV RBC AUTO: 89.3 FL (ref 79–97)
MONOCYTES # BLD AUTO: 0.94 10*3/MM3 (ref 0.1–0.9)
MONOCYTES NFR BLD AUTO: 7.4 % (ref 5–12)
NEUTROPHILS NFR BLD AUTO: 72.5 % (ref 42.7–76)
NEUTROPHILS NFR BLD AUTO: 9.24 10*3/MM3 (ref 1.7–7)
NITRITE UR QL STRIP: NEGATIVE
NRBC BLD AUTO-RTO: 0 /100 WBC (ref 0–0.2)
PH UR STRIP.AUTO: 7 [PH] (ref 5–8)
PLATELET # BLD AUTO: 249 10*3/MM3 (ref 140–450)
PMV BLD AUTO: 9.7 FL (ref 6–12)
POTASSIUM SERPL-SCNC: 3.9 MMOL/L (ref 3.5–5.2)
PROCALCITONIN SERPL-MCNC: 0.04 NG/ML (ref 0–0.25)
PROT SERPL-MCNC: 6.8 G/DL (ref 6–8.5)
PROT UR QL STRIP: ABNORMAL
RBC # BLD AUTO: 3.91 10*6/MM3 (ref 3.77–5.28)
RBC # UR STRIP: ABNORMAL /HPF
REF LAB TEST METHOD: ABNORMAL
SODIUM SERPL-SCNC: 137 MMOL/L (ref 136–145)
SP GR UR STRIP: 1.02 (ref 1–1.03)
SQUAMOUS #/AREA URNS HPF: ABNORMAL /HPF
UROBILINOGEN UR QL STRIP: ABNORMAL
WBC # UR STRIP: ABNORMAL /HPF
WBC NRBC COR # BLD AUTO: 12.73 10*3/MM3 (ref 3.4–10.8)

## 2024-11-20 PROCEDURE — 25010000002 CEFTRIAXONE PER 250 MG: Performed by: NURSE PRACTITIONER

## 2024-11-20 PROCEDURE — 80053 COMPREHEN METABOLIC PANEL: CPT | Performed by: NURSE PRACTITIONER

## 2024-11-20 PROCEDURE — 25810000003 SODIUM CHLORIDE 0.9 % SOLUTION: Performed by: FAMILY MEDICINE

## 2024-11-20 PROCEDURE — 96375 TX/PRO/DX INJ NEW DRUG ADDON: CPT

## 2024-11-20 PROCEDURE — 85025 COMPLETE CBC W/AUTO DIFF WBC: CPT | Performed by: NURSE PRACTITIONER

## 2024-11-20 PROCEDURE — 99283 EMERGENCY DEPT VISIT LOW MDM: CPT

## 2024-11-20 PROCEDURE — 87086 URINE CULTURE/COLONY COUNT: CPT | Performed by: NURSE PRACTITIONER

## 2024-11-20 PROCEDURE — 83690 ASSAY OF LIPASE: CPT | Performed by: NURSE PRACTITIONER

## 2024-11-20 PROCEDURE — 84145 PROCALCITONIN (PCT): CPT | Performed by: NURSE PRACTITIONER

## 2024-11-20 PROCEDURE — 96365 THER/PROPH/DIAG IV INF INIT: CPT

## 2024-11-20 PROCEDURE — 25010000002 ONDANSETRON PER 1 MG: Performed by: NURSE PRACTITIONER

## 2024-11-20 PROCEDURE — 81001 URINALYSIS AUTO W/SCOPE: CPT | Performed by: NURSE PRACTITIONER

## 2024-11-20 PROCEDURE — 83735 ASSAY OF MAGNESIUM: CPT | Performed by: NURSE PRACTITIONER

## 2024-11-20 RX ORDER — ONDANSETRON 2 MG/ML
4 INJECTION INTRAMUSCULAR; INTRAVENOUS ONCE
Status: COMPLETED | OUTPATIENT
Start: 2024-11-20 | End: 2024-11-20

## 2024-11-20 RX ORDER — ACETAMINOPHEN 500 MG
1000 TABLET ORAL ONCE
Status: COMPLETED | OUTPATIENT
Start: 2024-11-20 | End: 2024-11-20

## 2024-11-20 RX ORDER — METOCLOPRAMIDE HYDROCHLORIDE 5 MG/ML
10 INJECTION INTRAMUSCULAR; INTRAVENOUS ONCE AS NEEDED
Status: DISCONTINUED | OUTPATIENT
Start: 2024-11-20 | End: 2024-11-20 | Stop reason: HOSPADM

## 2024-11-20 RX ORDER — SODIUM CHLORIDE 0.9 % (FLUSH) 0.9 %
10 SYRINGE (ML) INJECTION AS NEEDED
Status: DISCONTINUED | OUTPATIENT
Start: 2024-11-20 | End: 2024-11-20 | Stop reason: HOSPADM

## 2024-11-20 RX ADMIN — ACETAMINOPHEN 1000 MG: 500 TABLET, FILM COATED ORAL at 18:05

## 2024-11-20 RX ADMIN — ONDANSETRON 4 MG: 2 INJECTION INTRAMUSCULAR; INTRAVENOUS at 17:10

## 2024-11-20 RX ADMIN — SODIUM CHLORIDE 1000 MG: 900 INJECTION INTRAVENOUS at 18:07

## 2024-11-20 RX ADMIN — SODIUM CHLORIDE 500 ML: 9 INJECTION, SOLUTION INTRAVENOUS at 18:09

## 2024-11-20 NOTE — ED PROVIDER NOTES
"Subjective   History of Present Illness  29-year-old female patient presents to the ED with complaints of suprapubic abdominal pain, generalized headache, and nausea and vomiting during pregnancy.  Patient is reportedly 12 weeks pregnant.  G5, L3, A1. Followed by OB/GYN Ron.  The abdominal pain has been \"on and off\" for the past 2 weeks.  She denies any vaginal bleeding or discharge.  Chart review indicates the patient has repeat GC and trichomonas infections.  Patient states that she was tested for these at 8 weeks gestation and all were negative.  She denies any current risk.  She had an established IUP on October 18.  Her last OB/GYN appointment was November 13.  Next follow-up is scheduled for December 10.  She states the headache has been \"on and off\" for \"a few weeks\".  She denies any head injury.  She denies a history of preeclampsia, and eclampsia during pregnancies.  Denies lower extremity swelling.  She reports a history of hyperemesis gravidarum with each pregnancy.  She states that her OB/GYN has tried her on several different antiemetics that are ineffective.  She states that she has vomited \"3-4 times\" today contents are food and liquids consumed.  She denies fever and diarrhea.  No urinary changes such as dysuria or hematuria.  Patient had a documented UTI on October 28 that presented in a similar way. Reports successfully treated with cefdinir.     ED Triage Vitals [11/20/24 1645]   Temp Heart Rate Resp BP SpO2   98.2 °F (36.8 °C) 108 18 124/69 98 %      Temp src Heart Rate Source Patient Position BP Location FiO2 (%)   Oral Monitor Sitting Right arm --         Review of Systems   Constitutional:  Negative for fever.   Cardiovascular:  Negative for leg swelling.   Gastrointestinal:  Positive for abdominal pain, nausea and vomiting. Negative for diarrhea.   Genitourinary:  Negative for dysuria, hematuria, vaginal bleeding and vaginal discharge.   Neurological:  Positive for headaches.       Past " Medical History:   Diagnosis Date    ASCUS with positive high risk HPV cervical 10/27/2024    Chlamydia 03/26/2024    Chlamydia 07/27/2023    Chlamydia 09/17/2021    Chlamydia 09/28/2020    Depression 07/01/2023    Gonorrhea 07/27/2023    Gonorrhea 09/17/2021    Gonorrhea 09/28/2020    Herpes     HSV1- valtrex prescribed    Infection due to Mycoplasma genitalium 08/13/2019    Trichomonas vaginitis 04/09/2019    Urinary tract infection     Urogenital trichomoniasis     + on 3/29/2018 - on 5/17/2018       No Known Allergies    Past Surgical History:   Procedure Laterality Date    DENTAL PROCEDURE      WISDOM TOOTH EXTRACTION         Family History   Problem Relation Age of Onset    Diabetes Maternal Grandmother     Diabetes Maternal Grandfather     Breast cancer Neg Hx     Ovarian cancer Neg Hx     Uterine cancer Neg Hx     Colon cancer Neg Hx        Social History     Socioeconomic History    Marital status: Single   Tobacco Use    Smoking status: Never    Smokeless tobacco: Never   Vaping Use    Vaping status: Never Used   Substance and Sexual Activity    Alcohol use: Not Currently     Alcohol/week: 1.0 standard drink of alcohol     Types: 1 Drinks containing 0.5 oz of alcohol per week    Drug use: No    Sexual activity: Yes     Partners: Male     Birth control/protection: None     Comment: 2 weeks           Objective   Physical Exam  Vitals and nursing note reviewed.   Constitutional:       General: She is awake. She is not in acute distress.     Appearance: She is not ill-appearing, toxic-appearing or diaphoretic.   HENT:      Head: Normocephalic and atraumatic.      Mouth/Throat:      Mouth: Mucous membranes are moist.      Pharynx: Oropharynx is clear.   Eyes:      Extraocular Movements: Extraocular movements intact.      Pupils: Pupils are equal, round, and reactive to light.   Cardiovascular:      Rate and Rhythm: Regular rhythm. Tachycardia present.      Heart sounds: Normal heart sounds. No murmur  heard.  Pulmonary:      Effort: Pulmonary effort is normal. No respiratory distress.      Breath sounds: Normal breath sounds. No stridor.   Abdominal:      General: Abdomen is protuberant. Bowel sounds are normal. There is no distension.      Palpations: Abdomen is soft.      Tenderness: There is no abdominal tenderness. There is no right CVA tenderness or left CVA tenderness.   Skin:     General: Skin is warm and dry.      Capillary Refill: Capillary refill takes less than 2 seconds.   Neurological:      Mental Status: She is alert and oriented to person, place, and time.   Psychiatric:         Mood and Affect: Mood normal.         Behavior: Behavior normal. Behavior is cooperative.         Procedures       Lab Results (last 24 hours)       Procedure Component Value Units Date/Time    CBC & Differential [[id number]]  (Abnormal) Collected: 11/20/24 1705    Specimen: Blood Updated: 11/20/24 1716    Narrative:      The following orders were created for panel order CBC & Differential.  Procedure                               Abnormality         Status                     ---------                               -----------         ------                     CBC Auto Differential[[id number]]        Abnormal            Final result                 Please view results for these tests on the individual orders.    Comprehensive Metabolic Panel [[id number]]  (Abnormal) Collected: 11/20/24 1705    Specimen: Blood Updated: 11/20/24 1738     Glucose 66 mg/dL      BUN 6 mg/dL      Creatinine 0.42 mg/dL      Sodium 137 mmol/L      Potassium 3.9 mmol/L      Chloride 103 mmol/L      CO2 26.0 mmol/L      Calcium 8.8 mg/dL      Total Protein 6.8 g/dL      Albumin 4.0 g/dL      ALT (SGPT) 12 U/L      AST (SGOT) 11 U/L      Alkaline Phosphatase 64 U/L      Total Bilirubin 0.2 mg/dL      Globulin 2.8 gm/dL      A/G Ratio 1.4 g/dL      BUN/Creatinine Ratio 14.3     Anion Gap 8.0 mmol/L      eGFR 136.8 mL/min/1.73     Narrative:      GFR  Normal >60  Chronic Kidney Disease <60  Kidney Failure <15      Lipase [[id number]]  (Normal) Collected: 11/20/24 1705    Specimen: Blood Updated: 11/20/24 1734     Lipase 17 U/L     Urinalysis With Culture If Indicated - Urine, Clean Catch [[id number]]  (Abnormal) Collected: 11/20/24 1705    Specimen: Urine, Clean Catch Updated: 11/20/24 1729     Color, UA Dark Yellow     Appearance, UA Turbid     pH, UA 7.0     Specific Gravity, UA 1.022     Glucose,  mg/dL (1+)     Ketones, UA Trace     Bilirubin, UA Negative     Blood, UA Negative     Protein, UA Trace     Leuk Esterase, UA Moderate (2+)     Nitrite, UA Negative     Urobilinogen, UA 1.0 E.U./dL    Narrative:      In absence of clinical symptoms, the presence of pyuria, bacteria, and/or nitrites on the urinalysis result does not correlate with infection.    Magnesium [[id number]]  (Normal) Collected: 11/20/24 1705    Specimen: Blood Updated: 11/20/24 1738     Magnesium 1.8 mg/dL     CBC Auto Differential [[id number]]  (Abnormal) Collected: 11/20/24 1705    Specimen: Blood Updated: 11/20/24 1716     WBC 12.73 10*3/mm3      RBC 3.91 10*6/mm3      Hemoglobin 12.1 g/dL      Hematocrit 34.9 %      MCV 89.3 fL      MCH 30.9 pg      MCHC 34.7 g/dL      RDW 13.3 %      RDW-SD 43.3 fl      MPV 9.7 fL      Platelets 249 10*3/mm3      Neutrophil % 72.5 %      Lymphocyte % 18.3 %      Monocyte % 7.4 %      Eosinophil % 0.9 %      Basophil % 0.4 %      Immature Grans % 0.5 %      Neutrophils, Absolute 9.24 10*3/mm3      Lymphocytes, Absolute 2.33 10*3/mm3      Monocytes, Absolute 0.94 10*3/mm3      Eosinophils, Absolute 0.11 10*3/mm3      Basophils, Absolute 0.05 10*3/mm3      Immature Grans, Absolute 0.06 10*3/mm3      nRBC 0.0 /100 WBC     Urine Culture - Urine, Urine, Clean Catch [[id number]] Collected: 11/20/24 1705    Specimen: Urine, Clean Catch Updated: 11/20/24 1716    Urinalysis, Microscopic Only - Urine, Clean Catch [[id number]]  (Abnormal) Collected: 11/20/24 170  "   Specimen: Urine, Clean Catch Updated: 11/20/24 1729     RBC, UA None Seen /HPF      WBC, UA 6-10 /HPF      Bacteria, UA 1+ /HPF      Squamous Epithelial Cells, UA 7-12 /HPF      Hyaline Casts, UA None Seen /LPF      Calcium Oxalate Crystals, UA Small/1+ /HPF      Amorphous Crystals, UA Large/3+ /HPF      Methodology Manual Light Microscopy    Procalcitonin [[id number]]  (Normal) Collected: 11/20/24 1705    Specimen: Blood Updated: 11/20/24 1817     Procalcitonin 0.04 ng/mL     Narrative:      As a Marker for Sepsis (Non-Neonates):    1. <0.5 ng/mL represents a low risk of severe sepsis and/or septic shock.  2. >2 ng/mL represents a high risk of severe sepsis and/or septic shock.    As a Marker for Lower Respiratory Tract Infections that require antibiotic therapy:    PCT on Admission    Antibiotic Therapy       6-12 Hrs later    >0.5                Strongly Recommended  >0.25 - <0.5        Recommended   0.1 - 0.25          Discouraged              Remeasure/reassess PCT  <0.1                Strongly Discouraged     Remeasure/reassess PCT    As 28 day mortality risk marker: \"Change in Procalcitonin Result\" (>80% or <=80%) if Day 0 (or Day 1) and Day 4 values are available. Refer to http://www.[employer]-pct-calculator.com    Change in PCT <=80%  A decrease of PCT levels below or equal to 80% defines a positive change in PCT test result representing a higher risk for 28-day all-cause mortality of patients diagnosed with severe sepsis for septic shock.    Change in PCT >80%  A decrease of PCT levels of more than 80% defines a negative change in PCT result representing a lower risk for 28-day all-cause mortality of patients diagnosed with severe sepsis or septic shock.                   ED Course  ED Course as of 11/21/24 0802   Wed Nov 20, 2024   1730 ED attending (Macario) consulted regarding presentation and work-up. Established IUP, no vaginal bleeding. Plan: labs [TD]   1730 CBC & Differential(!)  WBC 12.73, elevated.  " "[TD]   1730 Urinalysis With Culture If Indicated - Urine, Clean Catch(!)  Dark yellow, turbid.  1+ glucose.  Trace ketones.  Trace protein.  2+ leukocytes, 6-10 WBCs, 1+ bacteria, 7-10 squamous.  No blood, no RBCs. [TD]   1730 Comprehensive Metabolic Panel(!)  Normal renal function. No electrolyte disturbance.  [TD]      ED Course User Index  [TD] Kira Ames APRN                                                       Medical Decision Making  29-year-old female patient presents to the ED with complaints of suprapubic abdominal pain, generalized headache, and nausea and vomiting during pregnancy.  Patient is reportedly 12 weeks pregnant.  G5, L3, A1. Followed by OB/GYN Ron.  The abdominal pain has been \"on and off\" for the past 2 weeks.  She denies any vaginal bleeding or discharge.  Chart review indicates the patient has repeat GC and trichomonas infections.  Patient states that she was tested for these at 8 weeks gestation and all were negative.  She denies any current risk.  She had an established IUP on October 18.  Her last OB/GYN appointment was November 13.  Next follow-up is scheduled for December 10.  She states the headache has been \"on and off\" for \"a few weeks\".  She denies any head injury.  She denies a history of preeclampsia, and eclampsia during pregnancies.  Denies lower extremity swelling.  She reports a history of hyperemesis gravidarum with each pregnancy.  She states that her OB/GYN has tried her on several different antiemetics that are ineffective.  She states that she has vomited \"3-4 times\" today contents are food and liquids consumed.  She denies fever and diarrhea.  No urinary changes such as dysuria or hematuria.  Patient had a documented UTI on October 28 that presented in a similar way. Reports successfully treated with cefdinir.     DDX to include but not limited to: Hyperemesis Gravidarum, Urinary Tract Infection (Recurrent), Dehydration, Gastritis, Migraine/Tension HA, Round " Ligament Pain, Pelvic Inflammatory Disease (Low suspicion), Early Pregnancy Complication (Less likely given established IUP and lack of bleeding, possibilities include threatened miscarriage or other pregnancy-related issues)    Course of TX in the ED:  Labs Reviewed  COMPREHENSIVE METABOLIC PANEL - Abnormal; Notable for the following components:     Creatinine                    0.42 (*)            All other components within normal limits         Narrative: GFR Normal >60                  Chronic Kidney Disease <60                  Kidney Failure <15                    URINALYSIS W/ CULTURE IF INDICATED - Abnormal; Notable for the following components:     Color, UA                     Dark Yellow (*)               Appearance, UA                Turbid (*)               Glucose, UA                     (*)                  Ketones, UA                   Trace (*)               Protein, UA                   Trace (*)               Leuk Esterase, UA               (*)               All other components within normal limits         Narrative: In absence of clinical symptoms, the presence of pyuria, bacteria, and/or nitrites on the urinalysis result does not correlate with infection.  CBC WITH AUTO DIFFERENTIAL - Abnormal; Notable for the following components:     WBC                           12.73 (*)               Lymphocyte %                  18.3 (*)               Neutrophils, Absolute         9.24 (*)               Monocytes, Absolute           0.94 (*)               Immature Grans, Absolute      0.06 (*)            All other components within normal limits  URINALYSIS, MICROSCOPIC ONLY - Abnormal; Notable for the following components:     WBC, UA                       6-10 (*)               Bacteria, UA                  1+ (*)                 Squamous Epithelial Cells, UA   7-12 (*)            All other components within normal limits  LIPASE - Normal  MAGNESIUM - Normal  PROCALCITONIN - Normal         Narrative:  "As a Marker for Sepsis (Non-Neonates):                                    1. <0.5 ng/mL represents a low risk of severe sepsis and/or septic shock.                  2. >2 ng/mL represents a high risk of severe sepsis and/or septic shock.                                    As a Marker for Lower Respiratory Tract Infections that require antibiotic therapy:                                    PCT on Admission    Antibiotic Therapy       6-12 Hrs later                                    >0.5                Strongly Recommended                  >0.25 - <0.5        Recommended                   0.1 - 0.25          Discouraged              Remeasure/reassess PCT                  <0.1                Strongly Discouraged     Remeasure/reassess PCT                                    As 28 day mortality risk marker: \"Change in Procalcitonin Result\" (>80% or <=80%) if Day 0 (or Day 1) and Day 4 values are available. Refer to http://www.[facility] Pharmaceuticalspct-calculator.com                                    Change in PCT <=80%                  A decrease of PCT levels below or equal to 80% defines a positive change in PCT test result representing a higher risk for 28-day all-cause mortality of patients diagnosed with severe sepsis for septic shock.                                    Change in PCT >80%                  A decrease of PCT levels of more than 80% defines a negative change in PCT result representing a lower risk for 28-day all-cause mortality of patients diagnosed with severe sepsis or septic shock.                     URINE CULTURE  CBC AND DIFFERENTIAL    Medications  ondansetron (ZOFRAN) injection 4 mg (4 mg Intravenous Given 11/20/24 1710)  acetaminophen (TYLENOL) tablet 1,000 mg (1,000 mg Oral Given 11/20/24 1805)  cefTRIAXone (ROCEPHIN) 1,000 mg in sodium chloride 0.9 % 100 mL MBP (0 mg Intravenous Stopped 11/20/24 1841)  sodium chloride 0.9 % bolus 500 mL (0 mL Intravenous Stopped 11/20/24 1841)    No orders to " Patient does not have family/caregiver involved in care display    VSS /66, HR 86, RR 16, SpO2 97 %. No vomiting while present in the ED. Patient is stable for discharge home and patient is in agreement.  Will discharge home today with close interval outpatient follow-up by PCP for further care as needed. Patient verbalizes understanding of the discharge instructions provided. Patient also verbalizes understanding to follow-up as recommended, and to return to the ER with worsening or worrisome symptoms.      Problems Addressed:  Hyperemesis gravidarum: complicated acute illness or injury  Proteinuria, unspecified type: complicated acute illness or injury  Urinary tract infection in mother during first trimester of pregnancy: complicated acute illness or injury    Amount and/or Complexity of Data Reviewed  External Data Reviewed: labs, radiology and notes.  Labs: ordered. Decision-making details documented in ED Course.    Risk  OTC drugs.  Prescription drug management.        Final diagnoses:   Urinary tract infection in mother during first trimester of pregnancy   Hyperemesis gravidarum   Proteinuria, unspecified type       ED Disposition  ED Disposition       ED Disposition   Discharge    Condition   Stable    Comment   --               Lavelle Velasquez MD  [address]  [place] [id number] [phone]    Schedule an appointment as soon as possible for a visit   For a repeat urinalysis to check the protein in your urine.    [facility] EMERGENCY DEPARTMENT  [address] [place] [id number] [phone]    As needed, If not improving and sooner if worsening         Medication List        New Prescriptions      cephalexin 250 MG capsule  Commonly known as: KEFLEX  Take 1 capsule by mouth Every 6 (Six) Hours for 7 days.               Where to Get Your Medications        These medications were sent to [facility] [address] [facility] [phone] [facility] [phone]   [address] [place] [address]       Phone: [phone]   cephalexin 250 MG capsule            Kira Ames, APRN  11/21/24 0802     Yes Patient does not have family/caregiver involved in care Patient does not have family/caregiver involved in care

## 2024-11-21 NOTE — DISCHARGE INSTRUCTIONS
It was very nice to meet you. Thank you for allowing us to take care of you today at [facility].    Discharge Instructions for Urinary Tract Infection (UTI) in Pregnancy    Antibiotic Treatment:  Take all prescribed antibiotics exactly as directed, even if you begin to feel better before completing the full course. This is crucial to fully clear the infection and prevent it from returning.  Do not skip doses, and make sure to take the medication at the same times each day to help you remember.  A UTI during pregnancy should be treated promptly to avoid complications.    Hydration:  Drink plenty of water (about 8-10 glasses per day) to help flush out the bacteria from your urinary tract.  Avoid drinks that can irritate the bladder, such as caffeine, alcohol, and citrus juices.    Rest:  Get plenty of rest to support your immune system and help your body recover.  Avoid overexerting yourself while your body is healing.    Pain Relief:  If you experience pain or discomfort, your healthcare provider may recommend acetaminophen (Tylenol) for pain relief. Do not use ibuprofen (Advil) or aspirin unless approved by your doctor, as these can be harmful during pregnancy.  Avoid using over-the-counter bladder analgesics, such as phenazopyridine (Azo), unless specifically prescribed by your healthcare provider, as they are not recommended in pregnancy.    Monitor Symptoms:  Pay attention to any changes in your symptoms. If you experience worsening pain, fever, chills, or if your urine becomes cloudy, foul-smelling, or bloody, contact your healthcare provider immediately.  New symptoms like back pain, nausea, or vomiting could indicate a kidney infection, which requires immediate attention.    Avoid Holding Urine:  Try not to delay urination when you feel the urge. Holding urine can worsen the infection or increase the risk of developing a kidney infection.  Empty your bladder frequently to help clear out any  bacteria.    Proper Hygiene:  Always wipe from front to back after using the toilet to avoid introducing bacteria into the urethra.  Wear cotton underwear to help keep the genital area dry, and avoid tight-fitting clothes that can trap moisture.    Follow-Up Appointment:  Schedule a follow-up appointment with your obstetrician to ensure the infection has cleared. Your doctor may want to perform a urine culture to confirm the infection is gone.  If you have recurrent UTIs, your healthcare provider may recommend preventive measures or long-term antibiotics.    If you experience any of the following symptoms, seek medical help immediately:  Fever higher than 100.4°F (38°C)  Severe pelvic or back pain  Blood in your urine  Difficulty urinating or a noticeable increase in pain  Feeling faint, dizzy, or weak    It’s important to follow up with your healthcare provider and complete your antibiotic course to ensure a full recovery. A UTI during pregnancy should be treated promptly to avoid complications.     Discharge Instructions for Nausea and Vomitin. Hydration  Drink Fluids: Stay hydrated by drinking clear fluids like water, oral rehydration solutions (like Pedialyte), or broths. Avoid caffeinated, alcoholic, and sugary drinks.  Electrolyte Solutions: Use oral rehydration solutions or sports drinks to replenish lost electrolytes.  Avoid: Large amounts of fluids at once. Instead, sip small amounts frequently.    2. Diet  Start Slowly: Once you can tolerate fluids, gradually reintroduce bland foods such as bananas, rice, applesauce, and toast (BRAT diet).  Avoid: Fatty, fried, spicy, or rich foods (like dairy and red meat) until symptoms improve.    3. Medications  Prescription Medications: Follow any specific instructions if prescribed medications for nausea and vomiting.     4. Rest and Recovery  Rest: Get plenty of rest to help your body recover.  Avoid: Strenuous activities until you are fully recovered.    5.  Monitoring  Track Symptoms: Monitor the frequency and severity of your symptoms. Keep a record if possible.  Seek Help: Contact your healthcare provider if you experience severe symptoms such as:  Persistent vomiting or diarrhea for more than 48 hours  Signs of dehydration (e.g., dry mouth, dark urine, dizziness)  Blood in vomit or stool  Severe abdominal pain    Today you were seen in the emergency department for your symptoms. Please understand that an ER evaluation is just the start of your evaluation. We do the best we can, but we are often unable to fully find what is causing your symptoms from this single evaluation.  Because of this, the goal is to determine whether you need to be admitted to the hospital or if it is safe for you to go home and follow-up with your other health care providers such as your primary care provider physicians or a specialist on an outpatient basis.      Like we discussed, I strongly urge that you follow up with your primary care provider. Please call their office to set up an appointment as soon as possible so that you can be re-evaluated for your symptoms or for any other questions.  I have provided the information needed, including phone number, to call to set up an appointment in these discharge papers.      Educational material has also been provided in the following pages. Please take the time to read through this information for important and helpful information.      Please return to the emergency room within 12-48 hours if you experience symptoms such as the following:   Fever, chills, chest pain or shortness of breath, pain with inspiration/expiration, pain that travels to your arms, neck or back, nausea, vomiting, severe headache, tearing pain in your chest, dizziness, feel as though you are about to pass out, OR if you have any worsening symptoms, or any other concerns.

## 2024-11-22 LAB — BACTERIA SPEC AEROBE CULT: NO GROWTH

## 2024-12-16 ENCOUNTER — PATIENT OUTREACH (OUTPATIENT)
Dept: LABOR AND DELIVERY | Facility: HOSPITAL | Age: 29
End: 2024-12-16
Payer: COMMERCIAL

## 2024-12-16 NOTE — OUTREACH NOTE
[facility]  Unable to Reach    Questions/Answers      Flowsheet Row Responses   Pending Outreach Confirm Patient Interest   Call Attempt First   Outcome Missing or invalid number, MyChart message sent to patient                Kassi VILCHIS - RN  Maternity Nurse Navigator    12/16/2024, 14:00 CST

## 2024-12-17 ENCOUNTER — ROUTINE PRENATAL (OUTPATIENT)
Age: 29
End: 2024-12-17
Payer: COMMERCIAL

## 2024-12-17 VITALS — DIASTOLIC BLOOD PRESSURE: 64 MMHG | SYSTOLIC BLOOD PRESSURE: 106 MMHG | BODY MASS INDEX: 23.41 KG/M2 | WEIGHT: 128 LBS

## 2024-12-17 DIAGNOSIS — O09.899 SHORT INTERVAL BETWEEN PREGNANCIES AFFECTING PREGNANCY, ANTEPARTUM: ICD-10-CM

## 2024-12-17 DIAGNOSIS — Z87.440 HISTORY OF GBS (GROUP B STREPTOCOCCUS) UTI, CURRENTLY PREGNANT: ICD-10-CM

## 2024-12-17 DIAGNOSIS — O09.899 HISTORY OF GBS (GROUP B STREPTOCOCCUS) UTI, CURRENTLY PREGNANT: ICD-10-CM

## 2024-12-17 DIAGNOSIS — Z3A.15 15 WEEKS GESTATION OF PREGNANCY: Primary | ICD-10-CM

## 2024-12-17 DIAGNOSIS — Z28.39 MATERNAL VARICELLA, NON-IMMUNE: ICD-10-CM

## 2024-12-17 DIAGNOSIS — Z34.82 MULTIGRAVIDA IN SECOND TRIMESTER: ICD-10-CM

## 2024-12-17 DIAGNOSIS — O09.899 MATERNAL VARICELLA, NON-IMMUNE: ICD-10-CM

## 2024-12-17 DIAGNOSIS — Z71.85 IMMUNIZATION COUNSELING: ICD-10-CM

## 2024-12-17 LAB
GLUCOSE UR STRIP-MCNC: NEGATIVE MG/DL
PROT UR STRIP-MCNC: ABNORMAL MG/DL

## 2024-12-17 NOTE — PROGRESS NOTES
Reason for visit: Routine OB visit at 15w5d. ELIZABETH 2025, by Ultrasound    CC:  Denies concerns. Denies VB, LOF, pelvic pain, or cramping.     ROS: All systems reviewed and are negative with exception of the following amenorrhea    Wt 128 lb for a TWG of 0.454 kg (1 lb), /64, FHTs 156  Urine today and reviewed: negative glucose, trace protein    Anatomy Scan: scheduled for 2025    Exam:  General Appearance:  Healthy appearing . Normal mood and behavior.  HEENT: NCAT, EOMI  HR str and reg. Lungs clear. Resp even and unlabored.  Abdomen is soft and nontender. No CVA tenderness. Uterus is consistent with EGA  Ext: no edema, nontender, no trauma, or cyanosis.    Impression  Diagnoses and all orders for this visit:    1. 15 weeks gestation of pregnancy (Primary)  -     POC Urinalysis Dipstick    2. Multigravida in second trimester  Discussed second trimester of pregnancy, discomforts and measures of support, fetal movement, pelvic pain warnings, bleeding warnings, and signs and symptoms to report. Reviewed low risk chromosomal screening results. Discussed and encouraged to call or come to the hospital with vaginal bleeding, leaking of fluid, pelvic pain, or other concerns.     3. Short interval between pregnancies affecting pregnancy, antepartum    4. Maternal varicella, non-immune    5. History of GBS (group B streptococcus) UTI, currently pregnant    6. Immunization counseling  Discussed influenza immunization recommendations during pregnancy. Declines immunization today. To be screened if experiences symptoms of the flu. Discussed self-care measures for immune health.           Refer to AVS instructions for additional education provided.         Return to the office in 4 weeks for a routine prenatal visit with Dr. Velasquez and as needed with concerns.        This note has been signed electronically.    Nathalia Clark, DNP, APRN, CNM

## 2024-12-23 ENCOUNTER — PATIENT OUTREACH (OUTPATIENT)
Dept: LABOR AND DELIVERY | Facility: HOSPITAL | Age: 29
End: 2024-12-23
Payer: COMMERCIAL

## 2024-12-23 NOTE — OUTREACH NOTE
[facility]  Unable to Reach    Questions/Answers      Flowsheet Row Responses   Pending Outreach Confirm Patient Interest   Call Attempt Second   Outcome No answer/michaely                Kassi Parikh RN  Maternity Nurse Navigator    12/23/2024, 13:51 CST

## 2024-12-28 PROBLEM — Z87.440 HISTORY OF GBS (GROUP B STREPTOCOCCUS) UTI, CURRENTLY PREGNANT: Status: ACTIVE | Noted: 2024-12-28

## 2024-12-28 PROBLEM — O09.899 HISTORY OF GBS (GROUP B STREPTOCOCCUS) UTI, CURRENTLY PREGNANT: Status: ACTIVE | Noted: 2024-12-28

## 2025-01-16 ENCOUNTER — ROUTINE PRENATAL (OUTPATIENT)
Dept: OBSTETRICS AND GYNECOLOGY | Age: 30
End: 2025-01-16
Payer: COMMERCIAL

## 2025-01-16 VITALS — SYSTOLIC BLOOD PRESSURE: 110 MMHG | WEIGHT: 133.2 LBS | BODY MASS INDEX: 24.36 KG/M2 | DIASTOLIC BLOOD PRESSURE: 64 MMHG

## 2025-01-16 DIAGNOSIS — O09.899 MATERNAL VARICELLA, NON-IMMUNE: ICD-10-CM

## 2025-01-16 DIAGNOSIS — Z28.39 MATERNAL VARICELLA, NON-IMMUNE: ICD-10-CM

## 2025-01-16 DIAGNOSIS — Z87.440 HISTORY OF GBS (GROUP B STREPTOCOCCUS) UTI, CURRENTLY PREGNANT: ICD-10-CM

## 2025-01-16 DIAGNOSIS — Z3A.20 20 WEEKS GESTATION OF PREGNANCY: Primary | ICD-10-CM

## 2025-01-16 DIAGNOSIS — R87.610 ATYPICAL SQUAMOUS CELLS OF UNDETERMINED SIGNIFICANCE ON CYTOLOGIC SMEAR OF CERVIX (ASC-US): ICD-10-CM

## 2025-01-16 DIAGNOSIS — O09.899 HISTORY OF GBS (GROUP B STREPTOCOCCUS) UTI, CURRENTLY PREGNANT: ICD-10-CM

## 2025-01-16 DIAGNOSIS — Z34.82 MULTIGRAVIDA IN SECOND TRIMESTER: ICD-10-CM

## 2025-01-16 DIAGNOSIS — O09.899 SHORT INTERVAL BETWEEN PREGNANCIES AFFECTING PREGNANCY, ANTEPARTUM: ICD-10-CM

## 2025-01-16 LAB
GLUCOSE UR STRIP-MCNC: NEGATIVE MG/DL
PROT UR STRIP-MCNC: ABNORMAL MG/DL

## 2025-01-16 NOTE — PROGRESS NOTES
Chief Complaint   Patient presents with    Routine Prenatal Visit     Patient here for routine prenatal visit. Denies any contractions, bleeding and leakage of fluid.        No complaints. Has not felt baby yet. Some gas pains.  Denies regular contractions, leakage of fluid, vaginal bleeding or discharge.    Total weight gain: 2.812 kg (6 lb 3.2 oz)  Expected weight gain: 11.5 kg (25 lb)-16 kg (35 lb)  /64   Wt 60.4 kg (133 lb 3.2 oz)   LMP 08/28/2024   BMI 24.36 kg/m²     Diagnoses and all orders for this visit:    1. 20 weeks gestation of pregnancy (Primary)  -     POC Urinalysis Dipstick    2. Multigravida in second trimester    3. Short interval between pregnancies affecting pregnancy, antepartum    4. Maternal varicella, non-immune    5. History of GBS (group B streptococcus) UTI, currently pregnant    6. Atypical squamous cells of undetermined significance on cytologic smear of cervix (ASC-US)      MFM anatomy this afternoon.  ASCUS/HPV positive - needs colposcopy. Recommend this next visit. She agrees.  RTC 4 weeks

## 2025-02-13 ENCOUNTER — ROUTINE PRENATAL (OUTPATIENT)
Dept: OBSTETRICS AND GYNECOLOGY | Age: 30
End: 2025-02-13
Payer: COMMERCIAL

## 2025-02-13 VITALS — SYSTOLIC BLOOD PRESSURE: 128 MMHG | WEIGHT: 138 LBS | DIASTOLIC BLOOD PRESSURE: 64 MMHG | BODY MASS INDEX: 25.24 KG/M2

## 2025-02-13 DIAGNOSIS — Z3A.24 24 WEEKS GESTATION OF PREGNANCY: Primary | ICD-10-CM

## 2025-02-13 DIAGNOSIS — K21.9 GASTROESOPHAGEAL REFLUX DISEASE WITHOUT ESOPHAGITIS: ICD-10-CM

## 2025-02-13 LAB
GLUCOSE UR STRIP-MCNC: NEGATIVE MG/DL
PROT UR STRIP-MCNC: NEGATIVE MG/DL

## 2025-02-13 RX ORDER — FAMOTIDINE 20 MG/1
20 TABLET, FILM COATED ORAL 2 TIMES DAILY
Qty: 60 TABLET | Refills: 1 | Status: SHIPPED | OUTPATIENT
Start: 2025-02-13 | End: 2025-03-15

## 2025-02-13 NOTE — PROGRESS NOTES
Reason for visit: Routine OB visit at 24w0d. ELIZABETH 2025, by Ultrasound    CC:  feeling well. Denies VB, LOF, pelvic pain, or cramping.     ROS: All systems reviewed and are negative with exception of the following: GERD    Wt 138 lb for a TWG of 4.99 kg (11 lb), /64, FHTs 146  Urine today and reviewed: neg glucose, neg protein    Anatomy Scan: 2025- normal    Exam:  General Appearance:  Healthy appearing . Normal mood and behavior.  HEENT: NCAT, EOMI  HR str and reg. Lungs clear. Resp even and unlabored.  Abdomen is soft and nontender. No CVA tenderness. Uterus is consistent with EGA  Ext: no edema, nontender, no trauma, or cyanosis.    Impression  Diagnoses and all orders for this visit:    1. 24 weeks gestation of pregnancy (Primary)  -     POC Urinalysis Dipstick    2. Gastroesophageal reflux disease without esophagitis  Comments:  discussed using TUMS PRN but also can use Pepcid if occuring daily  Orders:  -     famotidine (Pepcid) 20 MG tablet; Take 1 tablet by mouth 2 (Two) Times a Day for 30 days.  Dispense: 60 tablet; Refill: 1      She will reschedule Colposcopy  Next prenatal visit she will have GTT.       This note has been signed electronically.    Meli BRUNO

## 2025-02-25 ENCOUNTER — ROUTINE PRENATAL (OUTPATIENT)
Dept: OBSTETRICS AND GYNECOLOGY | Age: 30
End: 2025-02-25
Payer: COMMERCIAL

## 2025-02-25 VITALS — DIASTOLIC BLOOD PRESSURE: 62 MMHG | WEIGHT: 139.2 LBS | SYSTOLIC BLOOD PRESSURE: 124 MMHG | BODY MASS INDEX: 25.46 KG/M2

## 2025-02-25 DIAGNOSIS — Z28.39 MATERNAL VARICELLA, NON-IMMUNE: ICD-10-CM

## 2025-02-25 DIAGNOSIS — Z87.440 HISTORY OF GBS (GROUP B STREPTOCOCCUS) UTI, CURRENTLY PREGNANT: ICD-10-CM

## 2025-02-25 DIAGNOSIS — R87.610 ASCUS WITH POSITIVE HIGH RISK HPV CERVICAL: ICD-10-CM

## 2025-02-25 DIAGNOSIS — O09.899 MATERNAL VARICELLA, NON-IMMUNE: ICD-10-CM

## 2025-02-25 DIAGNOSIS — O09.899 HISTORY OF GBS (GROUP B STREPTOCOCCUS) UTI, CURRENTLY PREGNANT: ICD-10-CM

## 2025-02-25 DIAGNOSIS — O99.820 GBS (GROUP B STREPTOCOCCUS CARRIER), +RV CULTURE, CURRENTLY PREGNANT: ICD-10-CM

## 2025-02-25 DIAGNOSIS — O09.899 SHORT INTERVAL BETWEEN PREGNANCIES AFFECTING PREGNANCY, ANTEPARTUM: ICD-10-CM

## 2025-02-25 DIAGNOSIS — Z3A.25 25 WEEKS GESTATION OF PREGNANCY: Primary | ICD-10-CM

## 2025-02-25 DIAGNOSIS — R87.810 ASCUS WITH POSITIVE HIGH RISK HPV CERVICAL: ICD-10-CM

## 2025-02-25 LAB
GLUCOSE UR STRIP-MCNC: NEGATIVE MG/DL
PROT UR STRIP-MCNC: NEGATIVE MG/DL

## 2025-02-25 NOTE — PROGRESS NOTES
"Chief Complaint   Patient presents with    Colposcopy     Pt here for colposcopy.   Pap 10/18/24 ASCUS +HPV    Routine Prenatal Visit     Patient here for routine prenatal visit. Denies any contractions, bleeding and leakage of fluid. Reports good fetal movement.       No complaints. Endorses appropriate fetal movement. Denies regular contractions, leakage of fluid, vaginal bleeding or discharge.    Total weight gain: 5.534 kg (12 lb 3.2 oz)  Expected weight gain: 11.5 kg (25 lb)-16 kg (35 lb)  /62   Wt 63.1 kg (139 lb 3.2 oz)   LMP 08/28/2024   BMI 25.46 kg/m²     Lab Results   Component Value Date    INTERPGYN (A) 10/18/2024     Atypical squamous cells of undetermined significance    HPVAPTIMA Detected (A) 10/18/2024       Colposcopy    Date/Time: 2/25/2025 1:23 PM    Performed by: Lavelle Velasquez MD  Authorized by: Lavelle Velasquez MD  Consent: Verbal consent obtained.  Risks and benefits: risks, benefits and alternatives were discussed  Consent given by: patient  Patient understanding: patient states understanding of the procedure being performed  Patient consent: the patient's understanding of the procedure matches consent given  Patient identity confirmed: verbally with patient  Time out: Immediately prior to procedure a \"time out\" was called to verify the correct patient, procedure, equipment, support staff and site/side marked as required.  Preparation: Patient was prepped and draped in the usual sterile fashion.  Local anesthesia used: no    Anesthesia:  Local anesthesia used: no    Sedation:  Patient sedated: no    Patient tolerance: patient tolerated the procedure well with no immediate complications  Comments: Colposcopy procedure fully reviewed. Patient questions regarding procedure and diagnosis answered. Consent was verbal and STOP procedure performed. A speculum was placed into the vagina. The cervix and vagina were painted with acetic acid solution. The entire T zone was visualized.  "     Colposcopy revealed the following:  Acetowhite epithelium: absent  Mosaicism: absent   Punctation: absent  Lesion(s): absent  Abnormal vessels: absent  Leukoplakia: absent    A cervical biopsy performed at: no  An ECC performed: no            Diagnoses and all orders for this visit:    1. 25 weeks gestation of pregnancy (Primary)  -     POC Urinalysis Dipstick    2. Maternal varicella, non-immune    3. Short interval between pregnancies affecting pregnancy, antepartum    4. History of GBS (group B streptococcus) UTI, currently pregnant    5. GBS (group B Streptococcus carrier), +RV culture, currently pregnant    6. ASCUS with positive high risk HPV cervical    Other orders  -     Colposcopy      GTT next visit  Colposcopy today - normal appearing cervix. Plan to repeat postpartum with biopsies.   RTC 2-3 weeks

## 2025-03-13 ENCOUNTER — ROUTINE PRENATAL (OUTPATIENT)
Dept: OBSTETRICS AND GYNECOLOGY | Age: 30
End: 2025-03-13
Payer: COMMERCIAL

## 2025-03-13 VITALS — DIASTOLIC BLOOD PRESSURE: 62 MMHG | SYSTOLIC BLOOD PRESSURE: 108 MMHG | BODY MASS INDEX: 25.72 KG/M2 | WEIGHT: 140.6 LBS

## 2025-03-13 DIAGNOSIS — Z71.85 IMMUNIZATION COUNSELING: ICD-10-CM

## 2025-03-13 DIAGNOSIS — O09.899 SHORT INTERVAL BETWEEN PREGNANCIES AFFECTING PREGNANCY, ANTEPARTUM: ICD-10-CM

## 2025-03-13 DIAGNOSIS — O99.820 GBS (GROUP B STREPTOCOCCUS CARRIER), +RV CULTURE, CURRENTLY PREGNANT: ICD-10-CM

## 2025-03-13 DIAGNOSIS — O09.899 MATERNAL VARICELLA, NON-IMMUNE: ICD-10-CM

## 2025-03-13 DIAGNOSIS — Z28.39 MATERNAL VARICELLA, NON-IMMUNE: ICD-10-CM

## 2025-03-13 DIAGNOSIS — Z67.41 TYPE O BLOOD, RH NEGATIVE: ICD-10-CM

## 2025-03-13 DIAGNOSIS — Z3A.28 28 WEEKS GESTATION OF PREGNANCY: Primary | ICD-10-CM

## 2025-03-13 DIAGNOSIS — Z64.1 MULTIGRAVIDA: ICD-10-CM

## 2025-03-13 DIAGNOSIS — O26.13 LOW MATERNAL WEIGHT GAIN, THIRD TRIMESTER: ICD-10-CM

## 2025-03-13 LAB
GLUCOSE UR STRIP-MCNC: NEGATIVE MG/DL
PROT UR STRIP-MCNC: NEGATIVE MG/DL

## 2025-03-13 NOTE — PROGRESS NOTES
Chief Complaint   Patient presents with    Routine Prenatal Visit     Patient here for routine prenatal visit. Denies any contractions, bleeding and leakage of fluid. Reports good fetal movement.       Ankles - starting to hurt. No calf pain. States worsens at end of day after working and standing. No edema. Endorses appropriate fetal movement. Denies regular contractions, leakage of fluid, vaginal bleeding or discharge.    Total weight gain: 6.169 kg (13 lb 9.6 oz)  Expected weight gain: 11.5 kg (25 lb)-16 kg (35 lb)  /62   Wt 63.8 kg (140 lb 9.6 oz)   LMP 08/28/2024   BMI 25.72 kg/m²       Diagnoses and all orders for this visit:    1. 28 weeks gestation of pregnancy (Primary)  -     POC Urinalysis Dipstick  -     Antibody Screen  -     CBC (No Diff)  -     Gestational Screen 1 Hr ([facility])  -     Hepatitis B Surface Antigen  -     Hepatitis C Antibody  -     HIV-1 / O / 2 Ag / Antibody  -     Treponema pallidum AB w/Reflex RPR  -     Tdap Vaccine Greater Than or Equal To 8yo IM  -     Rhogam Immune Globulin Immunization    2. GBS (group B Streptococcus carrier), +RV culture, currently pregnant    3. Maternal varicella, non-immune    4. Short interval between pregnancies affecting pregnancy, antepartum    5. Multigravida    6. Type O blood, Rh negative  -     Antibody Screen  -     Rhogam Immune Globulin Immunization    7. Immunization counseling  -     Tdap Vaccine Greater Than or Equal To 8yo IM  -     Rhogam Immune Globulin Immunization    8. Low maternal weight gain, third trimester      Discussed use of resting during breaks/elevating feet. Consider sole inserts / changing shoes for support as needed.   Growth at 32 wks   Tdap/RhoGAM today  Tubal- discussed, undecided currently  Kick counts/Labor precautions  RTC 2 weeks

## 2025-03-14 LAB
BLD GP AB SCN SERPL QL: NEGATIVE
ERYTHROCYTE [DISTWIDTH] IN BLOOD BY AUTOMATED COUNT: 12.1 % (ref 12.3–15.4)
GLUCOSE 1H P 50 G GLC PO SERPL-MCNC: 126 MG/DL (ref 65–139)
HBV SURFACE AG SERPL QL IA: NEGATIVE
HCT VFR BLD AUTO: 28.9 % (ref 34–46.6)
HCV IGG SERPL QL IA: NON REACTIVE
HGB BLD-MCNC: 9.7 G/DL (ref 12–15.9)
HIV 1+2 AB+HIV1 P24 AG SERPL QL IA: NON REACTIVE
MCH RBC QN AUTO: 30.4 PG (ref 26.6–33)
MCHC RBC AUTO-ENTMCNC: 33.6 G/DL (ref 31.5–35.7)
MCV RBC AUTO: 90.6 FL (ref 79–97)
PLATELET # BLD AUTO: 344 10*3/MM3 (ref 140–450)
RBC # BLD AUTO: 3.19 10*6/MM3 (ref 3.77–5.28)
TREPONEMA PALLIDUM IGG+IGM AB [PRESENCE] IN SERUM OR PLASMA BY IMMUNOASSAY: NON REACTIVE
WBC # BLD AUTO: 11.37 10*3/MM3 (ref 3.4–10.8)

## 2025-03-27 ENCOUNTER — ROUTINE PRENATAL (OUTPATIENT)
Dept: OBSTETRICS AND GYNECOLOGY | Age: 30
End: 2025-03-27
Payer: COMMERCIAL

## 2025-03-27 VITALS — WEIGHT: 140.6 LBS | DIASTOLIC BLOOD PRESSURE: 68 MMHG | BODY MASS INDEX: 25.72 KG/M2 | SYSTOLIC BLOOD PRESSURE: 114 MMHG

## 2025-03-27 DIAGNOSIS — O09.899 SHORT INTERVAL BETWEEN PREGNANCIES AFFECTING PREGNANCY, ANTEPARTUM: ICD-10-CM

## 2025-03-27 DIAGNOSIS — O99.891 LEG CRAMPS IN PREGNANCY: ICD-10-CM

## 2025-03-27 DIAGNOSIS — O09.899 MATERNAL VARICELLA, NON-IMMUNE: ICD-10-CM

## 2025-03-27 DIAGNOSIS — Z64.1 MULTIGRAVIDA: ICD-10-CM

## 2025-03-27 DIAGNOSIS — Z3A.30 30 WEEKS GESTATION OF PREGNANCY: Primary | ICD-10-CM

## 2025-03-27 DIAGNOSIS — O99.019 MATERNAL ANEMIA IN PREGNANCY, ANTEPARTUM: ICD-10-CM

## 2025-03-27 DIAGNOSIS — R25.2 LEG CRAMPS IN PREGNANCY: ICD-10-CM

## 2025-03-27 DIAGNOSIS — Z28.39 MATERNAL VARICELLA, NON-IMMUNE: ICD-10-CM

## 2025-03-27 LAB
GLUCOSE UR STRIP-MCNC: NEGATIVE MG/DL
PROT UR STRIP-MCNC: NEGATIVE MG/DL

## 2025-03-27 NOTE — PROGRESS NOTES
Chief Complaint   Patient presents with    Routine Prenatal Visit     Patient here for routine prenatal visit. Denies any contractions, bleeding and leakage of fluid. Reports good fetal movement.       Leg cramps and nose bleeds. Endorses appropriate fetal movement. Denies regular contractions, leakage of fluid, vaginal bleeding or discharge. Just got iron supplement today.     Total weight gain: 6.169 kg (13 lb 9.6 oz)  Expected weight gain: 11.5 kg (25 lb)-16 kg (35 lb)  /68   Wt 63.8 kg (140 lb 9.6 oz)   LMP 08/28/2024   BMI 25.72 kg/m²     Diagnoses and all orders for this visit:    1. 30 weeks gestation of pregnancy (Primary)  -     POC Urinalysis Dipstick    2. Short interval between pregnancies affecting pregnancy, antepartum    3. Multigravida    4. Maternal varicella, non-immune    5. Maternal anemia in pregnancy, antepartum        Third trimester care discussed  Home therapies discussed  Check iron/anemia 34wks as just now will be starting iron supplement  Tubal - declines  Kick counts/Labor precautions  RTC 2 weeks

## 2025-03-31 ENCOUNTER — TELEPHONE (OUTPATIENT)
Dept: OBSTETRICS AND GYNECOLOGY | Age: 30
End: 2025-03-31
Payer: COMMERCIAL

## 2025-03-31 NOTE — TELEPHONE ENCOUNTER
Called patient and advised that she will need to be evaluated in LDR for continued cramping. Patient voiced understanding.

## 2025-04-10 ENCOUNTER — ROUTINE PRENATAL (OUTPATIENT)
Dept: OBSTETRICS AND GYNECOLOGY | Age: 30
End: 2025-04-10
Payer: COMMERCIAL

## 2025-04-10 VITALS — BODY MASS INDEX: 25.61 KG/M2 | DIASTOLIC BLOOD PRESSURE: 66 MMHG | WEIGHT: 140 LBS | SYSTOLIC BLOOD PRESSURE: 104 MMHG

## 2025-04-10 DIAGNOSIS — Z3A.32 32 WEEKS GESTATION OF PREGNANCY: Primary | ICD-10-CM

## 2025-04-10 DIAGNOSIS — O99.820 GBS (GROUP B STREPTOCOCCUS CARRIER), +RV CULTURE, CURRENTLY PREGNANT: ICD-10-CM

## 2025-04-10 DIAGNOSIS — O26.893 HEARTBURN DURING PREGNANCY IN THIRD TRIMESTER: ICD-10-CM

## 2025-04-10 DIAGNOSIS — O09.899 SHORT INTERVAL BETWEEN PREGNANCIES AFFECTING PREGNANCY, ANTEPARTUM: ICD-10-CM

## 2025-04-10 DIAGNOSIS — R12 HEARTBURN DURING PREGNANCY IN THIRD TRIMESTER: ICD-10-CM

## 2025-04-10 DIAGNOSIS — Z67.41 TYPE O BLOOD, RH NEGATIVE: ICD-10-CM

## 2025-04-10 DIAGNOSIS — O09.899 MATERNAL VARICELLA, NON-IMMUNE: ICD-10-CM

## 2025-04-10 DIAGNOSIS — Z64.1 MULTIGRAVIDA: ICD-10-CM

## 2025-04-10 DIAGNOSIS — K59.00 CONSTIPATION DURING PREGNANCY IN THIRD TRIMESTER: ICD-10-CM

## 2025-04-10 DIAGNOSIS — O99.613 CONSTIPATION DURING PREGNANCY IN THIRD TRIMESTER: ICD-10-CM

## 2025-04-10 DIAGNOSIS — Z28.39 MATERNAL VARICELLA, NON-IMMUNE: ICD-10-CM

## 2025-04-10 LAB
GLUCOSE UR STRIP-MCNC: NEGATIVE MG/DL
PROT UR STRIP-MCNC: ABNORMAL MG/DL

## 2025-04-10 RX ORDER — POLYETHYLENE GLYCOL 3350 17 G/17G
17 POWDER, FOR SOLUTION ORAL DAILY
Qty: 10 EACH | Refills: 0 | Status: SHIPPED | OUTPATIENT
Start: 2025-04-10

## 2025-04-10 RX ORDER — FAMOTIDINE 20 MG/1
20 TABLET, FILM COATED ORAL DAILY
Qty: 30 TABLET | Refills: 1 | Status: SHIPPED | OUTPATIENT
Start: 2025-04-10 | End: 2026-04-10

## 2025-04-10 NOTE — PROGRESS NOTES
Chief Complaint   Patient presents with    Routine Prenatal Visit     Patient here for routine prenatal visit. Denies any contractions, bleeding and leakage of fluid. Reports good fetal movement.       Constipation - using colace. Still feels backed up.. Endorses appropriate fetal movement. Denies regular contractions, leakage of fluid, vaginal bleeding or discharge.    Total weight gain: 5.897 kg (13 lb)  Expected weight gain: 11.5 kg (25 lb)-16 kg (35 lb)  /66   Wt 63.5 kg (140 lb)   LMP 08/28/2024   BMI 25.61 kg/m²     US today:  Intrauterine pregnancy at 32w0d  Placental location: Anterior  Normal Interval growth  EFW:62%ile, AC:59%ile  Estimated fetal weight:  2033 g  Fetal position: Vertex  TRACEE: 12.2 cm  DVP: 4.5 cm  Fetal heart rate: 138    Diagnoses and all orders for this visit:    1. 32 weeks gestation of pregnancy (Primary)  -     POC Urinalysis Dipstick    2. Multigravida    3. Maternal varicella, non-immune    4. Short interval between pregnancies affecting pregnancy, antepartum    5. Type O blood, Rh negative    6. GBS (group B Streptococcus carrier), +RV culture, currently pregnant    7. Constipation during pregnancy in third trimester  -     polyethylene glycol (MiraLax) 17 g packet; Take 17 g by mouth Daily.  Dispense: 10 each; Refill: 0    8. Heartburn during pregnancy in third trimester  -     famotidine (Pepcid) 20 MG tablet; Take 1 tablet by mouth Daily.  Dispense: 30 tablet; Refill: 1        Kick counts/Labor precautions  RTC 2 weeks

## 2025-04-24 ENCOUNTER — ROUTINE PRENATAL (OUTPATIENT)
Dept: OBSTETRICS AND GYNECOLOGY | Age: 30
End: 2025-04-24
Payer: COMMERCIAL

## 2025-04-24 VITALS — DIASTOLIC BLOOD PRESSURE: 64 MMHG | BODY MASS INDEX: 26.01 KG/M2 | WEIGHT: 142.2 LBS | SYSTOLIC BLOOD PRESSURE: 108 MMHG

## 2025-04-24 DIAGNOSIS — Z3A.34 34 WEEKS GESTATION OF PREGNANCY: Primary | ICD-10-CM

## 2025-04-24 DIAGNOSIS — Z64.1 MULTIGRAVIDA: ICD-10-CM

## 2025-04-24 LAB
GLUCOSE UR STRIP-MCNC: NEGATIVE MG/DL
PROT UR STRIP-MCNC: NEGATIVE MG/DL

## 2025-04-24 NOTE — PROGRESS NOTES
Chief Complaint   Patient presents with    Routine Prenatal Visit     Patient here for routine prenatal visit. Denies any contractions, bleeding and leakage of fluid. Reports good fetal movement.       No complaints. Endorses appropriate fetal movement. Denies regular contractions, leakage of fluid, vaginal bleeding or discharge.    Total weight gain: 6.895 kg (15 lb 3.2 oz)  Expected weight gain: 11.5 kg (25 lb)-16 kg (35 lb)  /64   Wt 64.5 kg (142 lb 3.2 oz)   LMP 08/28/2024   BMI 26.01 kg/m²     Diagnoses and all orders for this visit:    1. 34 weeks gestation of pregnancy (Primary)  -     POC Urinalysis Dipstick    2. Multigravida        Kick counts/Labor precautions  RTC 2 weeks

## 2025-05-08 ENCOUNTER — ROUTINE PRENATAL (OUTPATIENT)
Dept: OBSTETRICS AND GYNECOLOGY | Age: 30
End: 2025-05-08
Payer: COMMERCIAL

## 2025-05-08 VITALS — SYSTOLIC BLOOD PRESSURE: 114 MMHG | WEIGHT: 142 LBS | DIASTOLIC BLOOD PRESSURE: 72 MMHG | BODY MASS INDEX: 25.97 KG/M2

## 2025-05-08 DIAGNOSIS — Z3A.36 36 WEEKS GESTATION OF PREGNANCY: Primary | ICD-10-CM

## 2025-05-08 DIAGNOSIS — O99.820 GBS (GROUP B STREPTOCOCCUS CARRIER), +RV CULTURE, CURRENTLY PREGNANT: ICD-10-CM

## 2025-05-08 LAB
GLUCOSE UR STRIP-MCNC: NEGATIVE MG/DL
PROT UR STRIP-MCNC: NEGATIVE MG/DL

## 2025-05-08 NOTE — PROGRESS NOTES
Chief Complaint   Patient presents with    Routine Prenatal Visit     No complaints. Endorses appropriate fetal movement. Denies regular contractions, leakage of fluid, vaginal bleeding or discharge.    Total weight gain: 6.804 kg (15 lb)  Expected weight gain: 11.5 kg (25 lb)-16 kg (35 lb)  /72   Wt 64.4 kg (142 lb)   LMP 08/28/2024   BMI 25.97 kg/m²     Urine: glucose negative, protein negative    MA/RN chaperone present for exam.  SVE: 1/30/-3, posterior, vertex  Swabs collected    Diagnoses and all orders for this visit:    1. 36 weeks gestation of pregnancy (Primary)  -     POC Urinalysis Dipstick  -     Chlamydia trachomatis, Neisseria gonorrhoeae, PCR w/ confirmation - Swab, Vagina    2. GBS (group B Streptococcus carrier), +RV culture, currently pregnant        Kick counts/Labor precautions  RTC 1 weeks

## 2025-05-10 ENCOUNTER — ANESTHESIA EVENT (OUTPATIENT)
Dept: LABOR AND DELIVERY | Facility: HOSPITAL | Age: 30
End: 2025-05-10
Payer: COMMERCIAL

## 2025-05-10 ENCOUNTER — ANESTHESIA (OUTPATIENT)
Dept: LABOR AND DELIVERY | Facility: HOSPITAL | Age: 30
End: 2025-05-10
Payer: COMMERCIAL

## 2025-05-10 ENCOUNTER — HOSPITAL ENCOUNTER (INPATIENT)
Facility: HOSPITAL | Age: 30
LOS: 3 days | Discharge: HOME | End: 2025-05-13
Attending: OBSTETRICS & GYNECOLOGY | Admitting: OBSTETRICS & GYNECOLOGY
Payer: COMMERCIAL

## 2025-05-10 ENCOUNTER — DOCUMENTATION (OUTPATIENT)
Dept: OBSTETRICS AND GYNECOLOGY | Age: 30
End: 2025-05-10
Payer: COMMERCIAL

## 2025-05-10 PROBLEM — O47.00 PRETERM CONTRACTIONS: Status: ACTIVE | Noted: 2025-05-10

## 2025-05-10 PROBLEM — Z3A.36 36 WEEKS GESTATION OF PREGNANCY: Status: ACTIVE | Noted: 2025-05-10

## 2025-05-10 PROBLEM — R10.9 ABDOMINAL PAIN DURING PREGNANCY IN THIRD TRIMESTER: Status: ACTIVE | Noted: 2025-05-10

## 2025-05-10 PROBLEM — O26.893 ABDOMINAL PAIN DURING PREGNANCY IN THIRD TRIMESTER: Status: ACTIVE | Noted: 2025-05-10

## 2025-05-10 LAB
ABO GROUP BLD: NORMAL
BLD GP AB SCN SERPL QL: POSITIVE
DEPRECATED RDW RBC AUTO: 43.9 FL (ref 37–54)
ERYTHROCYTE [DISTWIDTH] IN BLOOD BY AUTOMATED COUNT: 15.4 % (ref 12.3–15.4)
HCT VFR BLD AUTO: 30 % (ref 34–46.6)
HGB BLD-MCNC: 9.4 G/DL (ref 12–15.9)
MCH RBC QN AUTO: 25.1 PG (ref 26.6–33)
MCHC RBC AUTO-ENTMCNC: 31.3 G/DL (ref 31.5–35.7)
MCV RBC AUTO: 80 FL (ref 79–97)
PLATELET # BLD AUTO: 380 10*3/MM3 (ref 140–450)
PMV BLD AUTO: 9.4 FL (ref 6–12)
RBC # BLD AUTO: 3.75 10*6/MM3 (ref 3.77–5.28)
RESIDUAL RHIG DETECTED: NORMAL
RH BLD: NEGATIVE
T&S EXPIRATION DATE: NORMAL
WBC NRBC COR # BLD AUTO: 15.67 10*3/MM3 (ref 3.4–10.8)

## 2025-05-10 PROCEDURE — 86870 RBC ANTIBODY IDENTIFICATION: CPT | Performed by: OBSTETRICS & GYNECOLOGY

## 2025-05-10 PROCEDURE — 86900 BLOOD TYPING SEROLOGIC ABO: CPT | Performed by: OBSTETRICS & GYNECOLOGY

## 2025-05-10 PROCEDURE — 25010000002 ROPIVACAINE PER 1 MG: Performed by: NURSE ANESTHETIST, CERTIFIED REGISTERED

## 2025-05-10 PROCEDURE — 25810000003 LACTATED RINGERS SOLUTION: Performed by: OBSTETRICS & GYNECOLOGY

## 2025-05-10 PROCEDURE — C1755 CATHETER, INTRASPINAL: HCPCS | Performed by: NURSE ANESTHETIST, CERTIFIED REGISTERED

## 2025-05-10 PROCEDURE — 85027 COMPLETE CBC AUTOMATED: CPT | Performed by: OBSTETRICS & GYNECOLOGY

## 2025-05-10 PROCEDURE — 25010000002 PENICILLIN G POTASSIUM PER 600000 UNITS: Performed by: OBSTETRICS & GYNECOLOGY

## 2025-05-10 PROCEDURE — 25010000002 MORPHINE PER 10 MG: Performed by: OBSTETRICS & GYNECOLOGY

## 2025-05-10 PROCEDURE — 99202 OFFICE O/P NEW SF 15 MIN: CPT | Performed by: OBSTETRICS & GYNECOLOGY

## 2025-05-10 PROCEDURE — 25810000003 LACTATED RINGERS PER 1000 ML: Performed by: OBSTETRICS & GYNECOLOGY

## 2025-05-10 PROCEDURE — 86850 RBC ANTIBODY SCREEN: CPT | Performed by: OBSTETRICS & GYNECOLOGY

## 2025-05-10 PROCEDURE — 86780 TREPONEMA PALLIDUM: CPT | Performed by: OBSTETRICS & GYNECOLOGY

## 2025-05-10 PROCEDURE — 86901 BLOOD TYPING SEROLOGIC RH(D): CPT | Performed by: OBSTETRICS & GYNECOLOGY

## 2025-05-10 RX ORDER — DEXMEDETOMIDINE HYDROCHLORIDE 4 UG/ML
INJECTION, SOLUTION INTRAVENOUS AS NEEDED
Status: DISCONTINUED | OUTPATIENT
Start: 2025-05-10 | End: 2025-05-11 | Stop reason: SURG

## 2025-05-10 RX ORDER — LIDOCAINE HYDROCHLORIDE AND EPINEPHRINE 15; 5 MG/ML; UG/ML
INJECTION, SOLUTION EPIDURAL AS NEEDED
Status: DISCONTINUED | OUTPATIENT
Start: 2025-05-10 | End: 2025-05-11 | Stop reason: SURG

## 2025-05-10 RX ORDER — MISOPROSTOL 200 UG/1
800 TABLET ORAL ONCE
Status: DISCONTINUED | OUTPATIENT
Start: 2025-05-10 | End: 2025-05-11 | Stop reason: HOSPADM

## 2025-05-10 RX ORDER — METHYLERGONOVINE MALEATE 0.2 MG/ML
200 INJECTION INTRAVENOUS ONCE AS NEEDED
Status: DISCONTINUED | OUTPATIENT
Start: 2025-05-10 | End: 2025-05-11 | Stop reason: HOSPADM

## 2025-05-10 RX ORDER — SODIUM CHLORIDE 0.9 % (FLUSH) 0.9 %
10 SYRINGE (ML) INJECTION AS NEEDED
Status: DISCONTINUED | OUTPATIENT
Start: 2025-05-10 | End: 2025-05-11 | Stop reason: HOSPADM

## 2025-05-10 RX ORDER — OXYTOCIN/0.9 % SODIUM CHLORIDE 30/500 ML
999 PLASTIC BAG, INJECTION (ML) INTRAVENOUS ONCE
Status: COMPLETED | OUTPATIENT
Start: 2025-05-10 | End: 2025-05-11

## 2025-05-10 RX ORDER — SODIUM CHLORIDE 0.9 % (FLUSH) 0.9 %
10 SYRINGE (ML) INJECTION EVERY 12 HOURS SCHEDULED
Status: DISCONTINUED | OUTPATIENT
Start: 2025-05-10 | End: 2025-05-11 | Stop reason: HOSPADM

## 2025-05-10 RX ORDER — PENICILLIN G 3000000 [IU]/50ML
3 INJECTION, SOLUTION INTRAVENOUS EVERY 4 HOURS
Status: DISCONTINUED | OUTPATIENT
Start: 2025-05-11 | End: 2025-05-11 | Stop reason: HOSPADM

## 2025-05-10 RX ORDER — SODIUM CHLORIDE, SODIUM LACTATE, POTASSIUM CHLORIDE, CALCIUM CHLORIDE 600; 310; 30; 20 MG/100ML; MG/100ML; MG/100ML; MG/100ML
125 INJECTION, SOLUTION INTRAVENOUS CONTINUOUS
Status: DISPENSED | OUTPATIENT
Start: 2025-05-10 | End: 2025-05-12

## 2025-05-10 RX ORDER — SODIUM CHLORIDE 9 MG/ML
40 INJECTION, SOLUTION INTRAVENOUS AS NEEDED
Status: DISCONTINUED | OUTPATIENT
Start: 2025-05-10 | End: 2025-05-11 | Stop reason: HOSPADM

## 2025-05-10 RX ORDER — ONDANSETRON 4 MG/1
4 TABLET, ORALLY DISINTEGRATING ORAL EVERY 6 HOURS PRN
Status: DISCONTINUED | OUTPATIENT
Start: 2025-05-10 | End: 2025-05-11 | Stop reason: HOSPADM

## 2025-05-10 RX ORDER — EPHEDRINE SULFATE 50 MG/ML
10 INJECTION, SOLUTION INTRAVENOUS
Status: COMPLETED | OUTPATIENT
Start: 2025-05-10 | End: 2025-05-11

## 2025-05-10 RX ORDER — MORPHINE SULFATE 2 MG/ML
2 INJECTION, SOLUTION INTRAMUSCULAR; INTRAVENOUS
Status: DISCONTINUED | OUTPATIENT
Start: 2025-05-10 | End: 2025-05-11 | Stop reason: HOSPADM

## 2025-05-10 RX ORDER — ONDANSETRON 2 MG/ML
4 INJECTION INTRAMUSCULAR; INTRAVENOUS EVERY 6 HOURS PRN
Status: DISCONTINUED | OUTPATIENT
Start: 2025-05-10 | End: 2025-05-11 | Stop reason: HOSPADM

## 2025-05-10 RX ORDER — FAMOTIDINE 10 MG/ML
20 INJECTION, SOLUTION INTRAVENOUS ONCE AS NEEDED
OUTPATIENT
Start: 2025-05-10

## 2025-05-10 RX ORDER — TERBUTALINE SULFATE 1 MG/ML
0.25 INJECTION SUBCUTANEOUS AS NEEDED
Status: DISCONTINUED | OUTPATIENT
Start: 2025-05-10 | End: 2025-05-11 | Stop reason: HOSPADM

## 2025-05-10 RX ORDER — CITRIC ACID/SODIUM CITRATE 334-500MG
30 SOLUTION, ORAL ORAL ONCE AS NEEDED
Status: DISCONTINUED | OUTPATIENT
Start: 2025-05-10 | End: 2025-05-11 | Stop reason: HOSPADM

## 2025-05-10 RX ORDER — OXYTOCIN/0.9 % SODIUM CHLORIDE 30/500 ML
250 PLASTIC BAG, INJECTION (ML) INTRAVENOUS CONTINUOUS
Status: ACTIVE | OUTPATIENT
Start: 2025-05-10 | End: 2025-05-10

## 2025-05-10 RX ORDER — ACETAMINOPHEN 325 MG/1
650 TABLET ORAL EVERY 4 HOURS PRN
Status: DISCONTINUED | OUTPATIENT
Start: 2025-05-10 | End: 2025-05-11 | Stop reason: HOSPADM

## 2025-05-10 RX ORDER — EPHEDRINE SULFATE 50 MG/ML
INJECTION INTRAVENOUS
Status: COMPLETED
Start: 2025-05-10 | End: 2025-05-10

## 2025-05-10 RX ORDER — ROPIVACAINE HYDROCHLORIDE 2 MG/ML
INJECTION, SOLUTION EPIDURAL; INFILTRATION; PERINEURAL AS NEEDED
Status: DISCONTINUED | OUTPATIENT
Start: 2025-05-10 | End: 2025-05-11 | Stop reason: SURG

## 2025-05-10 RX ORDER — CARBOPROST TROMETHAMINE 250 UG/ML
250 INJECTION, SOLUTION INTRAMUSCULAR AS NEEDED
Status: DISCONTINUED | OUTPATIENT
Start: 2025-05-10 | End: 2025-05-11 | Stop reason: HOSPADM

## 2025-05-10 RX ORDER — CITRIC ACID/SODIUM CITRATE 334-500MG
30 SOLUTION, ORAL ORAL ONCE
Status: DISCONTINUED | OUTPATIENT
Start: 2025-05-10 | End: 2025-05-11 | Stop reason: HOSPADM

## 2025-05-10 RX ADMIN — LIDOCAINE HYDROCHLORIDE AND EPINEPHRINE 3 ML: 15; 5 INJECTION, SOLUTION EPIDURAL at 22:28

## 2025-05-10 RX ADMIN — DEXMEDETOMIDINE HYDROCHLORIDE IN 0.9% SODIUM CHLORIDE 40 MCG: 4 INJECTION INTRAVENOUS at 22:41

## 2025-05-10 RX ADMIN — EPHEDRINE SULFATE 10 MG: 50 INJECTION INTRAVENOUS at 22:42

## 2025-05-10 RX ADMIN — SODIUM CHLORIDE, POTASSIUM CHLORIDE, SODIUM LACTATE AND CALCIUM CHLORIDE 999 ML/HR: 600; 310; 30; 20 INJECTION, SOLUTION INTRAVENOUS at 22:44

## 2025-05-10 RX ADMIN — ROPIVACAINE HYDROCHLORIDE 10 ML: 2 INJECTION, SOLUTION EPIDURAL; INFILTRATION at 22:30

## 2025-05-10 RX ADMIN — ROPIVACAINE HYDROCHLORIDE 4 ML/HR: 2 INJECTION, SOLUTION EPIDURAL; INFILTRATION at 22:40

## 2025-05-10 RX ADMIN — MORPHINE SULFATE 2 MG: 2 INJECTION, SOLUTION INTRAMUSCULAR; INTRAVENOUS at 21:08

## 2025-05-10 RX ADMIN — SODIUM CHLORIDE, POTASSIUM CHLORIDE, SODIUM LACTATE AND CALCIUM CHLORIDE 125 ML/HR: 600; 310; 30; 20 INJECTION, SOLUTION INTRAVENOUS at 21:01

## 2025-05-10 RX ADMIN — DEXMEDETOMIDINE HYDROCHLORIDE IN 0.9% SODIUM CHLORIDE 20 MCG: 4 INJECTION INTRAVENOUS at 22:30

## 2025-05-10 RX ADMIN — SODIUM CHLORIDE 5 MILLION UNITS: 900 INJECTION INTRAVENOUS at 21:03

## 2025-05-10 RX ADMIN — ACETAMINOPHEN 650 MG: 325 TABLET, FILM COATED ORAL at 21:05

## 2025-05-10 RX ADMIN — SODIUM CHLORIDE, POTASSIUM CHLORIDE, SODIUM LACTATE AND CALCIUM CHLORIDE 1000 ML: 600; 310; 30; 20 INJECTION, SOLUTION INTRAVENOUS at 22:08

## 2025-05-10 NOTE — PROGRESS NOTES
Patient calls today with complaint of contractions. States she was checked by Dr. Velasquez this week and was 1 cm dilated. She is 36 weeks. This is her fourth baby. She states that she is feeling a lot of pressure and having contractions that are almost staying continuously. She is instructed to go to labor and delivery for evaluation. She denies any vaginal bleeding or that her water has broken.

## 2025-05-11 LAB
C TRACH RRNA SPEC QL NAA+PROBE: NEGATIVE
N GONORRHOEA RRNA SPEC QL NAA+PROBE: NEGATIVE
TREPONEMA PALLIDUM IGG+IGM AB [PRESENCE] IN SERUM OR PLASMA BY IMMUNOASSAY: NORMAL

## 2025-05-11 PROCEDURE — 25010000002 PENICILLIN G POTASSIUM PER 600000 UNITS: Performed by: OBSTETRICS & GYNECOLOGY

## 2025-05-11 PROCEDURE — 25810000003 LACTATED RINGERS PER 1000 ML: Performed by: OBSTETRICS & GYNECOLOGY

## 2025-05-11 PROCEDURE — 25010000002 ONDANSETRON PER 1 MG: Performed by: OBSTETRICS & GYNECOLOGY

## 2025-05-11 RX ORDER — ONDANSETRON 4 MG/1
4 TABLET, ORALLY DISINTEGRATING ORAL EVERY 8 HOURS PRN
Status: DISCONTINUED | OUTPATIENT
Start: 2025-05-11 | End: 2025-05-13 | Stop reason: HOSPADM

## 2025-05-11 RX ORDER — CALCIUM CARBONATE 500 MG/1
2 TABLET, CHEWABLE ORAL 3 TIMES DAILY PRN
Status: DISCONTINUED | OUTPATIENT
Start: 2025-05-11 | End: 2025-05-11 | Stop reason: HOSPADM

## 2025-05-11 RX ORDER — SODIUM CHLORIDE 0.9 % (FLUSH) 0.9 %
1-10 SYRINGE (ML) INJECTION AS NEEDED
Status: DISCONTINUED | OUTPATIENT
Start: 2025-05-11 | End: 2025-05-13 | Stop reason: HOSPADM

## 2025-05-11 RX ORDER — TRAMADOL HYDROCHLORIDE 50 MG/1
50 TABLET ORAL EVERY 6 HOURS PRN
Status: DISCONTINUED | OUTPATIENT
Start: 2025-05-11 | End: 2025-05-13 | Stop reason: HOSPADM

## 2025-05-11 RX ORDER — PROMETHAZINE HYDROCHLORIDE 25 MG/1
25 TABLET ORAL EVERY 6 HOURS PRN
Status: DISCONTINUED | OUTPATIENT
Start: 2025-05-11 | End: 2025-05-13 | Stop reason: HOSPADM

## 2025-05-11 RX ORDER — IBUPROFEN 600 MG/1
600 TABLET, FILM COATED ORAL EVERY 6 HOURS PRN
Status: DISCONTINUED | OUTPATIENT
Start: 2025-05-11 | End: 2025-05-11 | Stop reason: HOSPADM

## 2025-05-11 RX ORDER — HYDROCORTISONE 25 MG/G
1 CREAM TOPICAL AS NEEDED
Status: DISCONTINUED | OUTPATIENT
Start: 2025-05-11 | End: 2025-05-13 | Stop reason: HOSPADM

## 2025-05-11 RX ORDER — CALCIUM CARBONATE 500 MG/1
2 TABLET, CHEWABLE ORAL 3 TIMES DAILY PRN
Status: DISCONTINUED | OUTPATIENT
Start: 2025-05-11 | End: 2025-05-13 | Stop reason: HOSPADM

## 2025-05-11 RX ORDER — HYDROXYZINE HYDROCHLORIDE 25 MG/1
50 TABLET, FILM COATED ORAL NIGHTLY PRN
Status: DISCONTINUED | OUTPATIENT
Start: 2025-05-11 | End: 2025-05-13 | Stop reason: HOSPADM

## 2025-05-11 RX ORDER — MORPHINE SULFATE 2 MG/ML
1 INJECTION, SOLUTION INTRAMUSCULAR; INTRAVENOUS EVERY 4 HOURS PRN
Status: DISCONTINUED | OUTPATIENT
Start: 2025-05-11 | End: 2025-05-12

## 2025-05-11 RX ORDER — ONDANSETRON 4 MG/1
4 TABLET, ORALLY DISINTEGRATING ORAL EVERY 6 HOURS PRN
Status: DISCONTINUED | OUTPATIENT
Start: 2025-05-11 | End: 2025-05-11 | Stop reason: HOSPADM

## 2025-05-11 RX ORDER — ACETAMINOPHEN 325 MG/1
650 TABLET ORAL EVERY 6 HOURS PRN
Status: DISCONTINUED | OUTPATIENT
Start: 2025-05-11 | End: 2025-05-13 | Stop reason: HOSPADM

## 2025-05-11 RX ORDER — NALOXONE HCL 0.4 MG/ML
0.4 VIAL (ML) INJECTION
Status: DISCONTINUED | OUTPATIENT
Start: 2025-05-11 | End: 2025-05-13 | Stop reason: HOSPADM

## 2025-05-11 RX ORDER — METHYLERGONOVINE MALEATE 0.2 MG/ML
200 INJECTION INTRAVENOUS ONCE AS NEEDED
Status: DISCONTINUED | OUTPATIENT
Start: 2025-05-11 | End: 2025-05-13 | Stop reason: HOSPADM

## 2025-05-11 RX ORDER — ONDANSETRON 2 MG/ML
4 INJECTION INTRAMUSCULAR; INTRAVENOUS EVERY 6 HOURS PRN
Status: DISCONTINUED | OUTPATIENT
Start: 2025-05-11 | End: 2025-05-11 | Stop reason: HOSPADM

## 2025-05-11 RX ORDER — OXYTOCIN/0.9 % SODIUM CHLORIDE 30/500 ML
2-20 PLASTIC BAG, INJECTION (ML) INTRAVENOUS
Status: DISCONTINUED | OUTPATIENT
Start: 2025-05-11 | End: 2025-05-13 | Stop reason: HOSPADM

## 2025-05-11 RX ORDER — PRENATAL VIT/IRON FUM/FOLIC AC 27MG-0.8MG
1 TABLET ORAL DAILY
Status: DISCONTINUED | OUTPATIENT
Start: 2025-05-11 | End: 2025-05-13 | Stop reason: HOSPADM

## 2025-05-11 RX ORDER — BISACODYL 10 MG
10 SUPPOSITORY, RECTAL RECTAL DAILY PRN
Status: DISCONTINUED | OUTPATIENT
Start: 2025-05-12 | End: 2025-05-13 | Stop reason: HOSPADM

## 2025-05-11 RX ORDER — IBUPROFEN 600 MG/1
600 TABLET, FILM COATED ORAL EVERY 6 HOURS PRN
Status: DISCONTINUED | OUTPATIENT
Start: 2025-05-11 | End: 2025-05-13 | Stop reason: HOSPADM

## 2025-05-11 RX ORDER — DOCUSATE SODIUM 100 MG/1
100 CAPSULE, LIQUID FILLED ORAL 2 TIMES DAILY
Status: DISCONTINUED | OUTPATIENT
Start: 2025-05-11 | End: 2025-05-13 | Stop reason: HOSPADM

## 2025-05-11 RX ORDER — MISOPROSTOL 200 UG/1
600 TABLET ORAL ONCE AS NEEDED
Status: DISCONTINUED | OUTPATIENT
Start: 2025-05-11 | End: 2025-05-13 | Stop reason: HOSPADM

## 2025-05-11 RX ORDER — ONDANSETRON 2 MG/ML
4 INJECTION INTRAMUSCULAR; INTRAVENOUS EVERY 6 HOURS PRN
Status: DISCONTINUED | OUTPATIENT
Start: 2025-05-11 | End: 2025-05-13 | Stop reason: HOSPADM

## 2025-05-11 RX ORDER — PROMETHAZINE HYDROCHLORIDE 12.5 MG/1
12.5 SUPPOSITORY RECTAL EVERY 6 HOURS PRN
Status: DISCONTINUED | OUTPATIENT
Start: 2025-05-11 | End: 2025-05-13 | Stop reason: HOSPADM

## 2025-05-11 RX ORDER — OXYTOCIN/0.9 % SODIUM CHLORIDE 30/500 ML
125 PLASTIC BAG, INJECTION (ML) INTRAVENOUS ONCE AS NEEDED
Status: DISCONTINUED | OUTPATIENT
Start: 2025-05-11 | End: 2025-05-13 | Stop reason: HOSPADM

## 2025-05-11 RX ADMIN — EPHEDRINE SULFATE 10 MG: 50 INJECTION INTRAVENOUS at 08:51

## 2025-05-11 RX ADMIN — PENICILLIN G 3 MILLION UNITS: 3000000 INJECTION, SOLUTION INTRAVENOUS at 09:52

## 2025-05-11 RX ADMIN — EPHEDRINE SULFATE 10 MG: 50 INJECTION INTRAVENOUS at 00:37

## 2025-05-11 RX ADMIN — ONDANSETRON 4 MG: 2 INJECTION INTRAMUSCULAR; INTRAVENOUS at 06:18

## 2025-05-11 RX ADMIN — PENICILLIN G 3 MILLION UNITS: 3000000 INJECTION, SOLUTION INTRAVENOUS at 01:00

## 2025-05-11 RX ADMIN — Medication 2 MILLI-UNITS/MIN: at 06:06

## 2025-05-11 RX ADMIN — ACETAMINOPHEN 650 MG: 325 TABLET, FILM COATED ORAL at 12:21

## 2025-05-11 RX ADMIN — IBUPROFEN 600 MG: 600 TABLET ORAL at 15:00

## 2025-05-11 RX ADMIN — SODIUM CHLORIDE, POTASSIUM CHLORIDE, SODIUM LACTATE AND CALCIUM CHLORIDE 125 ML/HR: 600; 310; 30; 20 INJECTION, SOLUTION INTRAVENOUS at 07:13

## 2025-05-11 RX ADMIN — PENICILLIN G 3 MILLION UNITS: 3000000 INJECTION, SOLUTION INTRAVENOUS at 05:24

## 2025-05-11 RX ADMIN — Medication 250 ML/HR: at 12:37

## 2025-05-11 RX ADMIN — DOCUSATE SODIUM 100 MG: 100 CAPSULE, LIQUID FILLED ORAL at 21:43

## 2025-05-11 NOTE — INTERVAL H&P NOTE
H&P updated. The patient was examined and pt with cervical change from 1 cm to 4-5 cm. Pt admitted for delivery.

## 2025-05-11 NOTE — OBED NOTES
[facility]  [facility]    Patient Name: Dianna Damon  : 1995  MRN: [id number]  Primary Care Physician:  Provider, No Known  Date of Service: 5/10/2025    Subjective   Subjective     Chief Complaint:   Chief Complaint   Patient presents with    Abdominal Pain       HPI: Dianna Damon is a 29 y.o. year old  with an ELIZABETH of 2025, by Ultrasound, currently at 36w2d presenting with cramping. Contractions as well. No leakage of fluid or bleeding. Endorses appropriate fetal movement.    Prenatal care has been with Lavelle Velasquez MD  It has been noteworthy for:  GBS bacteruria  Varicella non-immune   Low risk fetal NIPT  Rh negative s/p RhoGAM    ROS:  All systems were reviewed and negative except for:   -endorses appropriate fetal movement  -denies leakage of fluid or vaginal bleeding  -contractions    Personal History     OB History    Para Term  AB Living   5 3 3 0 1 3   SAB IAB Ectopic Molar Multiple Live Births   1 0 0 0 0 3      # Outcome Date GA Lbr Curtis/2nd Weight Sex Type Anes PTL Lv   5 Current            4 Term 24 39w1d   F Vag-Spont EPI  ROLO      Apgar1: 8  Apgar5: 9   3 SAB 10/10/20 5w0d          2 Term 19 39w2d 03:45 / 00:11 3005 g (6 lb 10 oz) M Vag-Spont EPI N ROLO      Birth Comments: 33cm - HC      Name: JEMMA DAMON      Apgar1: 8  Apgar5: 9   1 Term 18 39w1d / 00:31 3690 g (8 lb 2.2 oz) M Vag-Vacuum EPI N ROLO      Birth Comments: 33 cm; AGA      Name: JEMMA DAMON      Apgar1: 7  Apgar5: 9       Past Medical History:   Diagnosis Date    ASCUS with positive high risk HPV cervical 10/27/2024    Chlamydia 2024    Chlamydia 2023    Chlamydia 2021    Chlamydia 2020    Depression 2023    Gonorrhea 2023    Gonorrhea 2021    Gonorrhea 2020    Herpes     HSV1- valtrex prescribed    Infection due to Mycoplasma genitalium 2019    Trichomonas vaginitis 2019    Urinary tract  infection     Urogenital trichomoniasis     + on 3/29/2018 - on 5/17/2018       Past Surgical History:   Procedure Laterality Date    DENTAL PROCEDURE      WISDOM TOOTH EXTRACTION         Family History: family history includes Diabetes in her maternal grandfather and maternal grandmother. Otherwise pertinent FHx was reviewed and not pertinent to current issue.    Social History:  reports that she has never smoked. She has never used smokeless tobacco. She reports that she does not currently use alcohol after a past usage of about 1.0 standard drink of alcohol per week. She reports that she does not use drugs.    Home Medications:  acetaminophen, famotidine, ferrous sulfate, ondansetron ODT, polyethylene glycol, prenatal vitamin, and promethazine    Allergies:  No Known Allergies    Objective   Objective     Vitals:     Temperature: Temp:  [98.3 °F (36.8 °C)] 98.3 °F (36.8 °C)       BP: BP: (127)/(75) 127/75 .vi   Pulse: Heart Rate:  [] 112   Respirations: Resp:  [18] 18   SPO2: SpO2:  [96 %-100 %] 100 %   O2 Devices     Weight:  BMI: Weight:  [64.7 kg (142 lb 9.6 oz)] 64.7 kg (142 lb 9.6 oz)  Body mass index is 25.26 kg/m².       Physical Exam  General: Well Developed, Well Nourished, not in acute distress   HEENT: normocephalic, atraumatic, conjunctiva non-icteric    Respiratory: non-labored, symmetrical chest rise   Gastrointestinal: gravid, soft, no TTP, no rebound tenderness or guarding to palpation, palpable ctx   Neurologic: alert and oriented, CN II-XII grossly intact without focal deficit, DTRs 2+ lower extremities, no clonus   Psychiatric: normal mood, pleasant, cooperative  Musculoskeletal: negative calf tenderness, no lower extremity edema  Skin: warm & dry, no cyanosis, no jaundice    Pelvic Exam:  *Consent to pelvic exam obtained verbally from the patient. RN chaperone present during the exam.  Cervix: was checked (by me): 1 cm / 30 % / -3; Position: posterior, Consistency: firm;  Vertex    Electronic Fetal Monitoring  Baseline [id number], moderate variability, (+) Accelerations, and (-) Decelerations  Tocometer: irritability, contractions every 1-2 minutes  Interpretation: reassuring, reactive, and category 1    Imaging  4/10  Intrauterine pregnancy at 32w0d  Placental location: Anterior  Normal Interval growth  EFW:62%ile, AC:59%ile  Estimated fetal weight:  2033 g  Fetal position: Vertex  TRACEE: 12.2 cm  DVP: 4.5 cm  Fetal heart rate: 138    Prenatal Labs  Lab Results   Component Value Date    ABO O 10/18/2024    ABSCRN Negative 2025    HGB 9.7 (L) 2025    RUBELLAABIGG 1.19 10/18/2024    HEPBSAG Negative 2025    LFM1RUU1 Non Reactive 2025    HEPCVIRUSABY Non Reactive 2025     2025    STREPGPB Negative 2024    URINECX No growth 2024    CHLAMNAA Negative 10/18/2024    NGONORRHON Negative 10/18/2024                     Assessment & Plan   Assessment / Plan       Abdominal pain during pregnancy in third trimester    36 weeks gestation of pregnancy     contractions      Dianna Damon is a 29 y.o. year old  with an ELIZABETH of 2025, by Ultrasound, currently at 36w2d presenting with abdominal cramping/contractions.    Pregnancy, 36w2d  Contractions    PLAN  -serial cervical checks  -Plan of care has been reviewed with patient   -Risks, benefits of treatment plan have been discussed  -All questions have been answered    Addendum  Repeat cervical check by RN noted 2-3/50/-3. Contractions still present. Painful. Start PCN for GBS prophylaxis as with GBS bacteruria. Continue inpatient monitoring with expectant management.     RN update - now /-3. Contractions every 2 minutes. Labor. Dr. Fraser, on-call OBGYN for [facility], notified of patient and will assume care.     Lavelle Velasquez MD [facility]  5/10/2025  20:33 CDT

## 2025-05-11 NOTE — INTERVAL H&P NOTE
H&P updated. The patient was examined and recently checked by RN. Now 4 cm. Admit in labor. Epidural at patient request.

## 2025-05-11 NOTE — H&P (VIEW-ONLY)
[facility]IS    Patient Name: Dianna Damon  : 1995  MRN: [id number]  Primary Care Physician:  Provider, No Known  Date of Service: 5/10/2025    Subjective   Subjective     Chief Complaint:   Chief Complaint   Patient presents with    Abdominal Pain       HPI: Dianna Damon is a 29 y.o. year old  with an ELIZABETH of 2025, by Ultrasound, currently at 36w2d presenting with cramping. Contractions as well. No leakage of fluid or bleeding. Endorses appropriate fetal movement.    Prenatal care has been with Lavelle Velasquez MD  It has been noteworthy for:  GBS bacteruria  Varicella non-immune   Low risk fetal NIPT  Rh negative s/p RhoGAM    ROS:  All systems were reviewed and negative except for:   -endorses appropriate fetal movement  -denies leakage of fluid or vaginal bleeding  -contractions    Personal History     OB History    Para Term  AB Living   5 3 3 0 1 3   SAB IAB Ectopic Molar Multiple Live Births   1 0 0 0 0 3      # Outcome Date GA Lbr Curtis/2nd Weight Sex Type Anes PTL Lv   5 Current            4 Term 24 39w1d   F Vag-Spont EPI  ROLO      Apgar1: 8  Apgar5: 9   3 SAB 10/10/20 5w0d          2 Term 19 39w2d 03:45 / 00:11 3005 g (6 lb 10 oz) M Vag-Spont EPI N ROLO      Birth Comments: 33cm - HC      Name: JEMMA DAMON      Apgar1: 8  Apgar5: 9   1 Term 18 39w1d / 00:31 3690 g (8 lb 2.2 oz) M Vag-Vacuum EPI N ROLO      Birth Comments: 33 cm; AGA      Name: JEMMA DAMON      Apgar1: 7  Apgar5: 9       Past Medical History:   Diagnosis Date    ASCUS with positive high risk HPV cervical 10/27/2024    Chlamydia 2024    Chlamydia 2023    Chlamydia 2021    Chlamydia 2020    Depression 2023    Gonorrhea 2023    Gonorrhea 2021    Gonorrhea 2020    Herpes     HSV1- valtrex prescribed    Infection due to Mycoplasma genitalium 2019    Trichomonas vaginitis 2019    Urinary tract  infection     Urogenital trichomoniasis     + on 3/29/2018 - on 5/17/2018       Past Surgical History:   Procedure Laterality Date    DENTAL PROCEDURE      WISDOM TOOTH EXTRACTION         Family History: family history includes Diabetes in her maternal grandfather and maternal grandmother. Otherwise pertinent FHx was reviewed and not pertinent to current issue.    Social History:  reports that she has never smoked. She has never used smokeless tobacco. She reports that she does not currently use alcohol after a past usage of about 1.0 standard drink of alcohol per week. She reports that she does not use drugs.    Home Medications:  acetaminophen, famotidine, ferrous sulfate, ondansetron ODT, polyethylene glycol, prenatal vitamin, and promethazine    Allergies:  No Known Allergies    Objective   Objective     Vitals:     Temperature: Temp:  [98.3 °F (36.8 °C)] 98.3 °F (36.8 °C)       BP: BP: (127)/(75) 127/75 .vi   Pulse: Heart Rate:  [] 112   Respirations: Resp:  [18] 18   SPO2: SpO2:  [96 %-100 %] 100 %   O2 Devices     Weight:  BMI: Weight:  [64.7 kg (142 lb 9.6 oz)] 64.7 kg (142 lb 9.6 oz)  Body mass index is 25.26 kg/m².       Physical Exam  General: Well Developed, Well Nourished, not in acute distress   HEENT: normocephalic, atraumatic, conjunctiva non-icteric    Respiratory: non-labored, symmetrical chest rise   Gastrointestinal: gravid, soft, no TTP, no rebound tenderness or guarding to palpation, palpable ctx   Neurologic: alert and oriented, CN II-XII grossly intact without focal deficit, DTRs 2+ lower extremities, no clonus   Psychiatric: normal mood, pleasant, cooperative  Musculoskeletal: negative calf tenderness, no lower extremity edema  Skin: warm & dry, no cyanosis, no jaundice    Pelvic Exam:  *Consent to pelvic exam obtained verbally from the patient. RN chaperone present during the exam.  Cervix: was checked (by me): 1 cm / 30 % / -3; Position: posterior, Consistency: firm;  Vertex    Electronic Fetal Monitoring  Baseline [id number], moderate variability, (+) Accelerations, and (-) Decelerations  Tocometer: irritability, contractions every 1-2 minutes  Interpretation: reassuring, reactive, and category 1    Imaging  4/10  Intrauterine pregnancy at 32w0d  Placental location: Anterior  Normal Interval growth  EFW:62%ile, AC:59%ile  Estimated fetal weight:  2033 g  Fetal position: Vertex  TRACEE: 12.2 cm  DVP: 4.5 cm  Fetal heart rate: 138    Prenatal Labs  Lab Results   Component Value Date    ABO O 10/18/2024    ABSCRN Negative 2025    HGB 9.7 (L) 2025    RUBELLAABIGG 1.19 10/18/2024    HEPBSAG Negative 2025    BGJ2UXP1 Non Reactive 2025    HEPCVIRUSABY Non Reactive 2025     2025    STREPGPB Negative 2024    URINECX No growth 2024    CHLAMNAA Negative 10/18/2024    NGONORRHON Negative 10/18/2024                     Assessment & Plan   Assessment / Plan       Abdominal pain during pregnancy in third trimester    36 weeks gestation of pregnancy     contractions      Dianna Damon is a 29 y.o. year old  with an ELIZABETH of 2025, by Ultrasound, currently at 36w2d presenting with abdominal cramping/contractions.    Pregnancy, 36w2d  Contractions    PLAN  -serial cervical checks  -Plan of care has been reviewed with patient   -Risks, benefits of treatment plan have been discussed  -All questions have been answered    Addendum  Repeat cervical check by RN noted 2-3/50/-3. Contractions still present. Painful. Start PCN for GBS prophylaxis as with GBS bacteruria. Continue inpatient monitoring with expectant management.     RN update - now /-3. Contractions every 2 minutes. Labor. Dr. Fraser, on-call OBGYN for [facility], notified of patient and will assume care.     Lavelle Velasquez MD [facility]  5/10/2025  20:33 CDT

## 2025-05-11 NOTE — L&D DELIVERY NOTE
[facility]  Vaginal Delivery Note  Review the Delivery Report for details.       Delivery     Delivery: Vacuum assisted vaginal delivery    YOB: 2024   Time of Birth:  Gestational Age 11:52 AM  36w3d     Anesthesia: Epidural    Delivering clinician: Karrie Fraser   Forceps?   No   Vacuum? Yes  Vacuum Delivery  Vacuum attempted?      Vacuum indication:     Vacuum type: Kiwi Pro (bell)  Kiwi    Application location:     First Attempt     Time applied:     Time removed:     Second Attempt    Time applied:     Time removed:     Third Attempt    Time applied:     Time removed:     Number of pulls: 1   Number of pop-offs: 0   Low-end pressure range:     High-end pressure range:     Total application time:     Applied by: DR. FRASER   Failed? No       Shoulder dystocia present: No        Delivery narrative:  The patient was noted to be completely dilated and effaced with the fetal head at +4 station. FHTs in the 's for several minutes with poor maternal effort. Vacuum placed for one pull, no popoffs.  The fetal vertex was delivered OA  spontaneously with maternal pushing efforts after baby rotating on the last push. Nuchal cord times 2 easily reduced. The left anterior shoulder was delivered atraumatically by maternal expulsive efforts with the assistance of routine downward traction. The posterior shoulder delivered with maternal expulsive efforts and routine upward traction. The remainder of the fetus delivered spontaneously. Upon delivery, the infant was noted to be vigorous and was passed to the mother's abdomen into the care of the awaiting infant care nurse. Following a 60-second delay in cord clamping, the cord was clamped x2 and cut. Cord blood was obtained for analysis and blood gas analysis. During the third stage of labor, IV Pitocin was administered to enhance uterine contraction. The placenta delivered spontaneously, intact, with a three-vessel cord.The cervix, vagina and perineum  "were inspected for lacerations.No lacerations. Good hemostasis was confirmed. No lacerations were noted to the cervix, perineum, or vulva. The uterine fundus was firm at the end of the procedure. Mom and baby tolerated the delivery well. All needle, sponge, and instrument counts were noted to be correct x2 at the end of the procedure.       Infant    Findings: male infant     Infant observations: Weight: No birth weight on file.  Length:   in  Observations/Comments:        Apgars:   @ 1 minute /      @ 5 minutes   Infant Name:      Placenta, Cord, and Fluid    Placenta delivered  Spontaneous at   5/11/2025 11:56 AM    Cord: 3 vessels present.   Nuchal Cord?  yes; Number of nuchal loops present:  2   Cord blood obtained: Yes   Cord gases obtained:  Yes   Cord gas results: Venous:  No results found for: \"PHCVEN\", \"BECVEN\"    Arterial:  No results found for: \"PHCART\", \"BECART\"     Repair    Episiotomy: None    No    Lacerations: No   Estimated Blood Loss:  100 cc             Quantitative Blood Loss:    QBL from VAG DEL: 94 (05/11/25 1158)             Complications  none    Disposition  Mother to Mother Baby/Postpartum  in stable condition currently.  Baby to remains with mom  in stable condition currently.      Karrie Fraser MD  05/11/25  12:02 CDT       "

## 2025-05-11 NOTE — PLAN OF CARE
Goal Outcome Evaluation:  Plan of Care Reviewed With: patient, significant other        Progress: improving  Outcome Evaluation: VSS. [id number]. Voiding, ambulating, passing gas. C/o cramping, PRN medications given. Bonding well with infant.

## 2025-05-11 NOTE — PLAN OF CARE
Goal Outcome Evaluation:              Outcome Evaluation:  36w3d spontaneous labor augmented with pitocin and AROM. Delivered vaginal with vacuum asssit at 1152. No lacerations. FF, midline, 1 below, scant lochia. VSS. Plan to transfer to .

## 2025-05-11 NOTE — ANESTHESIA PREPROCEDURE EVALUATION
Anesthesia Evaluation     Patient summary reviewed and Nursing notes reviewed   NPO Solid Status: > 8 hours  NPO Liquid Status: > 6 hours           Airway   Mallampati: I  TM distance: >3 FB  Neck ROM: full  No difficulty expected  Dental    (+) poor dentition    Pulmonary - negative pulmonary ROS and normal exam   Cardiovascular - negative cardio ROS and normal exam        Neuro/Psych    ROS Comment: Hx scoliosis  GI/Hepatic/Renal/Endo - negative ROS     Musculoskeletal (-) negative ROS    Abdominal  - normal exam   Substance History - negative use     OB/GYN    (+) Pregnant        Other - negative ROS                   Anesthesia Plan    ASA 2     epidural       Anesthetic plan, risks, benefits, and alternatives have been provided, discussed and informed consent has been obtained with: patient.    CODE STATUS:    Code Status (Patient has no pulse and is not breathing): CPR (Attempt to Resuscitate)  Medical Interventions (Patient has pulse or is breathing): Full Support

## 2025-05-11 NOTE — PLAN OF CARE
Problem: Adult Inpatient Plan of Care  Goal: Patient-Specific Goal (Individualized)  Outcome: Progressing  Flowsheets (Taken 5/11/2025 0647)  Patient/Family-Specific Goals (Include Timeframe): successful vaginal delivery today  Individualized Care Needs: none  Anxieties, Fears or Concerns: concerned about length of labor   Goal Outcome Evaluation:  Plan of Care Reviewed With: patient, significant other, family        Progress: improving  Outcome Evaluation: spontaneous labor with augmentation now-pitocin; latest SVE at 0603 4/70/-3 with bloody show; epidural in place and pain controlled; intermittent nausea resolved with zofran; GBS+ and 3 doses of Pen G given; baby boy, bottle feeding, ped-[place]back

## 2025-05-11 NOTE — ANESTHESIA PROCEDURE NOTES
Labor Epidural      Patient location during procedure: OB  Indication:at surgeon's request  Performed By  CRNA/CATALINA: Hiram Lester CRNA  Preanesthetic Checklist  Completed: patient identified, IV checked, site marked, risks and benefits discussed, surgical consent, monitors and equipment checked, pre-op evaluation and timeout performed  Prep:  Pt Position:sitting  Sterile Tech:gloves and sterile barrier  Prep:DuraPrep  Monitoring:blood pressure monitoring and continuous pulse oximetry  Epidural Block Procedure:  Approach:midline  Guidance:landmark technique and palpation technique  Location:L3-L4  Needle Type:Tuohy  Needle Gauge:17 G  Loss of Resistance Medium: saline  Loss of Resistance: 6cm  Paresthesia: none, right and transient  Aspiration:negative  Test Dose:negative  Number of Attempts: 1  Post Assessment:  Dressing:occlusive dressing applied and secured with tape  Pt Tolerance:patient tolerated the procedure well with no apparent complications  Complications:no

## 2025-05-12 LAB
BASOPHILS # BLD AUTO: 0.07 10*3/MM3 (ref 0–0.2)
BASOPHILS NFR BLD AUTO: 0.4 % (ref 0–1.5)
DEPRECATED RDW RBC AUTO: 45.9 FL (ref 37–54)
EOSINOPHIL # BLD AUTO: 0.15 10*3/MM3 (ref 0–0.4)
EOSINOPHIL NFR BLD AUTO: 0.9 % (ref 0.3–6.2)
ERYTHROCYTE [DISTWIDTH] IN BLOOD BY AUTOMATED COUNT: 15.4 % (ref 12.3–15.4)
HCT VFR BLD AUTO: 26.9 % (ref 34–46.6)
HGB BLD-MCNC: 8.2 G/DL (ref 12–15.9)
IMM GRANULOCYTES # BLD AUTO: 0.12 10*3/MM3 (ref 0–0.05)
IMM GRANULOCYTES NFR BLD AUTO: 0.7 % (ref 0–0.5)
LYMPHOCYTES # BLD AUTO: 4.05 10*3/MM3 (ref 0.7–3.1)
LYMPHOCYTES NFR BLD AUTO: 24.4 % (ref 19.6–45.3)
MCH RBC QN AUTO: 25.1 PG (ref 26.6–33)
MCHC RBC AUTO-ENTMCNC: 30.5 G/DL (ref 31.5–35.7)
MCV RBC AUTO: 82.3 FL (ref 79–97)
MONOCYTES # BLD AUTO: 1.58 10*3/MM3 (ref 0.1–0.9)
MONOCYTES NFR BLD AUTO: 9.5 % (ref 5–12)
NEUTROPHILS NFR BLD AUTO: 10.66 10*3/MM3 (ref 1.7–7)
NEUTROPHILS NFR BLD AUTO: 64.1 % (ref 42.7–76)
NRBC BLD AUTO-RTO: 0 /100 WBC (ref 0–0.2)
PLATELET # BLD AUTO: 274 10*3/MM3 (ref 140–450)
PMV BLD AUTO: 9.7 FL (ref 6–12)
RBC # BLD AUTO: 3.27 10*6/MM3 (ref 3.77–5.28)
WBC NRBC COR # BLD AUTO: 16.63 10*3/MM3 (ref 3.4–10.8)

## 2025-05-12 PROCEDURE — 85025 COMPLETE CBC W/AUTO DIFF WBC: CPT | Performed by: OBSTETRICS & GYNECOLOGY

## 2025-05-12 PROCEDURE — 0503F POSTPARTUM CARE VISIT: CPT | Performed by: OBSTETRICS & GYNECOLOGY

## 2025-05-12 RX ORDER — ASCORBIC ACID 500 MG
500 TABLET ORAL DAILY
Status: DISCONTINUED | OUTPATIENT
Start: 2025-05-12 | End: 2025-05-13 | Stop reason: HOSPADM

## 2025-05-12 RX ORDER — FERROUS SULFATE 325(65) MG
325 TABLET ORAL 2 TIMES DAILY WITH MEALS
Status: DISCONTINUED | OUTPATIENT
Start: 2025-05-12 | End: 2025-05-13 | Stop reason: HOSPADM

## 2025-05-12 RX ADMIN — PRENATAL VIT W/ FE FUMARATE-FA TAB 27-0.8 MG 1 TABLET: 27-0.8 TAB at 08:06

## 2025-05-12 RX ADMIN — ACETAMINOPHEN 650 MG: 325 TABLET, FILM COATED ORAL at 21:58

## 2025-05-12 RX ADMIN — FERROUS SULFATE TAB 325 MG (65 MG ELEMENTAL FE) 325 MG: 325 (65 FE) TAB at 19:25

## 2025-05-12 RX ADMIN — OXYCODONE HYDROCHLORIDE AND ACETAMINOPHEN 500 MG: 500 TABLET ORAL at 09:27

## 2025-05-12 RX ADMIN — DOCUSATE SODIUM 100 MG: 100 CAPSULE, LIQUID FILLED ORAL at 08:06

## 2025-05-12 RX ADMIN — IBUPROFEN 600 MG: 600 TABLET ORAL at 01:57

## 2025-05-12 RX ADMIN — DOCUSATE SODIUM 100 MG: 100 CAPSULE, LIQUID FILLED ORAL at 21:58

## 2025-05-12 RX ADMIN — FERROUS SULFATE TAB 325 MG (65 MG ELEMENTAL FE) 325 MG: 325 (65 FE) TAB at 09:27

## 2025-05-12 NOTE — PLAN OF CARE
Goal Outcome Evaluation:  Plan of Care Reviewed With: patient, spouse        Progress: improving  Outcome Evaluation: VSS: FF/ML/U1 with scant lochia, pt voiding and ambulating, shower today, d/c'd IV today, patient bonding well with infant

## 2025-05-12 NOTE — PLAN OF CARE
Goal Outcome Evaluation:  Plan of Care Reviewed With: patient        Progress: improving     VSS. Voiding passing gas and showered. U1FFML scant bleeding. C/o intermittent cramping. Ibuprofen given this shift. Bonding well with infant.

## 2025-05-12 NOTE — ANESTHESIA POSTPROCEDURE EVALUATION
Patient: Dianna Damon    Procedure Summary       Date: 05/10/25 Room / Location:     Anesthesia Start: 2223 Anesthesia Stop: 05/11/25 1152    Procedure: LABOR ANALGESIA Diagnosis:     Scheduled Providers:  Provider: Hiram Lester CRNA    Anesthesia Type: epidural ASA Status: 2            Anesthesia Type: epidural    Vitals  Vitals Value Taken Time   /69 05/12/25 08:00   Temp 98 °F (36.7 °C) 05/12/25 08:00   Pulse 86 05/12/25 08:00   Resp 20 05/12/25 08:00   SpO2 98 % 05/12/25 08:00           Post Anesthesia Care and Evaluation    Patient location during evaluation: PACU  Patient participation: complete - patient participated  Level of consciousness: awake and alert  Pain score: 0  Pain management: adequate    Airway patency: patent  Anesthetic complications: No anesthetic complications    Cardiovascular status: acceptable and stable  Respiratory status: acceptable and unassisted  Hydration status: acceptable  Post Neuraxial Block status: Motor and sensory function returned to baseline and No signs or symptoms of PDPH

## 2025-05-12 NOTE — PROGRESS NOTES
"      Lavelle Velasquez MD  [facility]  [address]  [place] [address]  Office [phone]  Fax [phone]    [facility]  Vaginal Delivery Progress Note    Subjective   Postpartum Day 1: VAVD    The patient feels well.  Her pain is well controlled.   She is ambulating well.  Patient describes her bleeding as thin lochia.    Breastfeeding: declines. Denies breast complaints    Objective     Vital Signs Range for the last 24 hours  Temperature: Temp:  [97.2 °F (36.2 °C)-98.9 °F (37.2 °C)] 97.8 °F (36.6 °C)   Temp Source: Temp src: Temporal   BP: BP: ()/(37-76) 102/64   Pulse: Heart Rate:  [] 77   Respirations: Resp:  [16-18] 16   SPO2: SpO2:  [96 %-100 %] 97 %   O2 Amount (l/min):     O2 Devices Device (Oxygen Therapy): room air   Weight:       Admit Height:  Height: 160 cm (63\")    Physical Exam:    General Alert, No acute distress, non-toxic appearing    Lungs Non-labored, symmetrical chest rise   Abdomen abdomen is soft without significant tenderness, masses, organomegaly or guarding. Fundus: appropriate, firm, non tender   Extremities  extremities normal, atraumatic, no cyanosis or edema and Homans sign is negative, no sign of DVT     Result Review     Lab results reviewed:  Yes     Prenatal Labs  Lab Results   Component Value Date    HGB 8.2 (L) 05/12/2025    RUBELLAABIGG 1.19 10/18/2024    HEPBSAG Negative 03/13/2025    ABSCRN Positive 05/10/2025    [id number] Non Reactive 03/13/2025    HEPCVIRUSABY Non Reactive 03/13/2025     03/13/2025    STREPGPB Negative 03/26/2024    URINECX No growth 11/20/2024    CHLAMNAA Negative 05/08/2025    NGONORRHON Negative 05/08/2025       Immunizations:   Immunization History   Administered Date(s) Administered    DTaP 01/02/1997, 03/11/1997, 05/19/1997, 11/19/1997, 11/30/2000    DTaP, Unspecified 11/30/2000    FluMist 2-49yrs (Nasal) 09/10/2013, 01/05/2016    HPV Quadrivalent 09/10/2013, 09/16/2014, 01/05/2016    Hepatitis B Adult/Adolescent IM " 1995, 1995, 05/19/1997    HiB 01/02/1997, 03/04/1997, 05/19/1997, 11/19/1997    IPV 01/02/1997, 03/04/1997, 05/19/1997, 11/19/1997, 11/30/2000    MCV4 Unspecified 09/10/2013, 01/05/2016    MMR 11/19/1997, 11/30/2000    Meningococcal MCV4P (Menactra) 09/10/2013, 01/05/2016    Rho (D) Immune Globulin 09/28/2018, 11/29/2018, 08/13/2019, 11/13/2019, 10/07/2020, 01/30/2024, 04/18/2024, 03/13/2025    Tdap 02/07/2008, 11/29/2018, 01/30/2024, 03/13/2025    Varicella 11/30/2018     Lab Results (last 24 hours)       Procedure Component Value Units Date/Time    CBC & Differential [[id number]]  (Abnormal) Collected: 05/12/25 0727    Specimen: Blood Updated: 05/12/25 0742    Narrative:      The following orders were created for panel order CBC & Differential.  Procedure                               Abnormality         Status                     ---------                               -----------         ------                     CBC Auto Differential[[id number]]        Abnormal            Final result                 Please view results for these tests on the individual orders.    CBC Auto Differential [[id number]]  (Abnormal) Collected: 05/12/25 0727    Specimen: Blood Updated: 05/12/25 0742     WBC 16.63 10*3/mm3      RBC 3.27 10*6/mm3      Hemoglobin 8.2 g/dL      Hematocrit 26.9 %      MCV 82.3 fL      MCH 25.1 pg      MCHC 30.5 g/dL      RDW 15.4 %      RDW-SD 45.9 fl      MPV 9.7 fL      Platelets 274 10*3/mm3      Neutrophil % 64.1 %      Lymphocyte % 24.4 %      Monocyte % 9.5 %      Eosinophil % 0.9 %      Basophil % 0.4 %      Immature Grans % 0.7 %      Neutrophils, Absolute 10.66 10*3/mm3      Lymphocytes, Absolute 4.05 10*3/mm3      Monocytes, Absolute 1.58 10*3/mm3      Eosinophils, Absolute 0.15 10*3/mm3      Basophils, Absolute 0.07 10*3/mm3      Immature Grans, Absolute 0.12 10*3/mm3      nRBC 0.0 /100 WBC     Treponema pallidum AB w/Reflex RPR [[id number]]  (Normal) Collected: 05/10/25 2055     Specimen: Blood Updated: 25 1201     Treponemal AB Total Non-Reactive    Narrative:      Reactive results will reflex RPR testing.            CBC          3/13/2025    10:01 5/10/2025    20:55 2025    07:27   CBC   WBC 11.37  15.67  16.63    RBC 3.19  3.75  3.27    Hemoglobin 9.7  9.4  8.2    Hematocrit 28.9  30.0  26.9    MCV 90.6  80.0  82.3    MCH 30.4  25.1  25.1    MCHC 33.6  31.3  30.5    RDW 12.1  15.4  15.4    Platelets 344  380  274        Postpartum Depression: Low Risk  (2024)    [place]  Depression Scale     Last EPDS Total Score: 2     Last EPDS Self Harm Result: Unrecognized value              Assessment & Plan       Abdominal pain during pregnancy in third trimester    36 weeks gestation of pregnancy     contractions        Dianna Damon is Day 1  post-partum  Vaginal, Vacuum (Extractor)  .      Plan:    Continue current care  PO iron supplement  DC IV  Varicella non-immune: vaccine recommended; patient declines  RhoGAM: not indicated, baby is O negative blood type      Lavelle Velasquez MD  2025  08:44 CDT

## 2025-05-12 NOTE — LACTATION NOTE
Verified that mother desires to formula feed. Formula booklet and suppression handout provided and reviewed. Mother currently wearing bra, educated to continue wearing bra for 1-2 weeks. Educated on s/s of mastitis and engorgement and how to treat and when to see provider. No questions at this time.     Suppression of Lactation for Non-Nursing Mothers handout    If you choose not to breastfeed, please let us know if you have any questions about whether yours was the right choice for you and your family.  While there are a very few conditions where breastfeeding is not recommended, most uses surrounding breastfeeding can be managed with proper support.  We are here to hep and support you no matter how you choose to feed your baby.    To suppress further lactation and prevent milk from coming in-- as much as possible:  **Wear a good fitting support bra without an under wire (sports bras are good)   **Wear bra continuously day and night for about 1-2 weeks  **Avoid any kind of breast stimulation such as rubbing, warmth or massage.  ** While showering, try to stand with your back to the water. The warm water will     encourage milk flow.  **Cold compression may be applied for 20 minutes once per hour as needed.  **Anti-Inflammatory medications such as ibuprofen (Motrin) may help.  Ue per your doctors and/or manufacture instructions.  ** If you develop a fever greater than 101 F, especially if you also have flu- like symptoms and any areas of redness or swelling in your breasts, please call your physician. You may need treatment for a condition called mastitis.      The Many Benefits if Breastfeeding   Breastfeeding saves time  *Breastfeeding allows you to calm or feed your baby immediately, which leads to a happier baby who cries less  *There is nothing to buy, prepare, or maintain.There is nothing to clean or sterilize.  Breastfeeding builds a mothers confidence  *She knows all her baby needs to thrive is  her!  Breastfeeding saves Money  *There is no formula to buy and healthier breast fed babies have less medical costs  Healthy Mom/Healthy baby  * babies get sick less often, and when they do they are usually sick less severely and for a shorter time  * babies have fewer ear infections  * babies have fewer allergies  *Mothers who breastfeed have a lower risk for cancer, osteoporosis, anemia, high blood pressure, obesity, and Type ll diabetes  *Mothers miss less work days with sick babies  Breast fed babies have a better dental health  * babies have better jaw development which requires lest orthodontic work  *Breast milk does not promote cavities  * babies can nurse at night without worry of tooth decay  Breastfeeding allows a baby to reach his full IQ potential  *The longer a baby is breast fed, the better their brain development  Breast fed babies and moms are more relaxed  *The hormones released during breastfeeding have a calming effect on mothers  *Breastfeeding requires mom to take a break; this may help mom get more rest after delivery  *Breastfeeding is quicker than preparing formula which allows mom and baby to get back to sleep faster  *Breastfeeding promotes bonding and allows mom to learn babies cues and care needs more quickly  Breastfeeding cleanup is easier  *The bowel movements and spit up of breast fed babies doesn't smell as bad  *Spit-up of breast fed babies doesn't stain clothing  Getting out of the hourse is easier  *No formula bottles to prepare and carry safely   *No time restraints due to worry about what baby will eat  *No worries about warming a bottle or finding safe water to prepare bottles  Breastfeeding mother get their bodies back sooner  *The uterus shrinks more quickly and completely, which allows a flatter tummy  *Breastfeeding burns 400-500 calories a day; making milk torches stored fat!  Breastfeeding is better for the  environment  *There is no trash to dispose of after breastfeeding  *There is no production facility to produce breast milk; moms body does it all without the pollution of a factory      Your Guide to formula feeding your baby by its learning, www.[employer]

## 2025-05-13 VITALS
TEMPERATURE: 97.8 F | RESPIRATION RATE: 16 BRPM | HEIGHT: 63 IN | DIASTOLIC BLOOD PRESSURE: 72 MMHG | HEART RATE: 77 BPM | BODY MASS INDEX: 25.27 KG/M2 | WEIGHT: 142.6 LBS | OXYGEN SATURATION: 98 % | SYSTOLIC BLOOD PRESSURE: 115 MMHG

## 2025-05-13 PROBLEM — O23.41 GROUP B STREPTOCOCCUS URINARY TRACT INFECTION AFFECTING PREGNANCY IN FIRST TRIMESTER: Status: RESOLVED | Noted: 2024-11-13 | Resolved: 2025-05-13

## 2025-05-13 PROBLEM — R10.9 ABDOMINAL PAIN DURING PREGNANCY IN THIRD TRIMESTER: Status: RESOLVED | Noted: 2025-05-10 | Resolved: 2025-05-13

## 2025-05-13 PROBLEM — Z87.440 HISTORY OF GBS (GROUP B STREPTOCOCCUS) UTI, CURRENTLY PREGNANT: Status: RESOLVED | Noted: 2024-12-28 | Resolved: 2025-05-13

## 2025-05-13 PROBLEM — O47.00 PRETERM CONTRACTIONS: Status: RESOLVED | Noted: 2025-05-10 | Resolved: 2025-05-13

## 2025-05-13 PROBLEM — Z3A.36 36 WEEKS GESTATION OF PREGNANCY: Status: RESOLVED | Noted: 2025-05-10 | Resolved: 2025-05-13

## 2025-05-13 PROBLEM — O09.899 SHORT INTERVAL BETWEEN PREGNANCIES AFFECTING PREGNANCY, ANTEPARTUM: Status: RESOLVED | Noted: 2024-10-18 | Resolved: 2025-05-13

## 2025-05-13 PROBLEM — O26.893 ABDOMINAL PAIN DURING PREGNANCY IN THIRD TRIMESTER: Status: RESOLVED | Noted: 2025-05-10 | Resolved: 2025-05-13

## 2025-05-13 PROBLEM — B95.1 GROUP B STREPTOCOCCUS URINARY TRACT INFECTION AFFECTING PREGNANCY IN FIRST TRIMESTER: Status: RESOLVED | Noted: 2024-11-13 | Resolved: 2025-05-13

## 2025-05-13 PROBLEM — O09.899 HISTORY OF GBS (GROUP B STREPTOCOCCUS) UTI, CURRENTLY PREGNANT: Status: RESOLVED | Noted: 2024-12-28 | Resolved: 2025-05-13

## 2025-05-13 PROBLEM — Z34.90 PREGNANCY: Status: RESOLVED | Noted: 2024-10-18 | Resolved: 2025-05-13

## 2025-05-13 PROBLEM — Z64.1 MULTIGRAVIDA: Status: RESOLVED | Noted: 2024-10-18 | Resolved: 2025-05-13

## 2025-05-13 PROCEDURE — 0503F POSTPARTUM CARE VISIT: CPT | Performed by: ADVANCED PRACTICE MIDWIFE

## 2025-05-13 RX ORDER — IBUPROFEN 200 MG
400 TABLET ORAL EVERY 4 HOURS PRN
Start: 2025-05-13

## 2025-05-13 RX ADMIN — DOCUSATE SODIUM 100 MG: 100 CAPSULE, LIQUID FILLED ORAL at 08:49

## 2025-05-13 RX ADMIN — PRENATAL VIT W/ FE FUMARATE-FA TAB 27-0.8 MG 1 TABLET: 27-0.8 TAB at 08:49

## 2025-05-13 RX ADMIN — OXYCODONE HYDROCHLORIDE AND ACETAMINOPHEN 500 MG: 500 TABLET ORAL at 08:49

## 2025-05-13 RX ADMIN — FERROUS SULFATE TAB 325 MG (65 MG ELEMENTAL FE) 325 MG: 325 (65 FE) TAB at 08:49

## 2025-05-13 NOTE — NURSING NOTE
RN to bedside to educate pt  on discharge instructions written and verbal.  Pt discharged home in stable condition.  No S/S of distress noted.       IV not present upon discharge.

## 2025-05-13 NOTE — PLAN OF CARE
Goal Outcome Evaluation:  Plan of Care Reviewed With: patient        Progress: improving     VSS, fundus firm midline 1cm below umbilicus, scant bleeding. Ambulating, voiding, formula feeding baby, bonding with baby

## 2025-05-13 NOTE — DISCHARGE SUMMARY
[facility]    Nathalia Clark, APRN  [address]  [place] [address]  [phone]      Discharge Summary     Alexi Damon  : 1995  MRN: [id number]  CSN: [id number]    Date of Admission: 5/10/2025   Date of Discharge:  2025   Delivering Physician: Karrie Fraser       Admission Diagnosis:  contractions [O47.00]   Discharge Diagnosis: Pregnancy at 36w3d - delivered       Procedures: 2025 - Vaginal, Vacuum (Extractor)      Hospital Course  Patient is a 29 y.o.  who at 36w3d had a vaginal birth.  Her postpartum course was without complications.  On PPD 2, she was ready for discharge.  She had normal lochia and pain managed with oral medication and comfort products.     Infant  male fetus weighing 3460 g (7 lb 10.1 oz)  Apgars -  8 @ 1 minute /  9 @ 5 minutes.    Discharge labs  Lab Results   Component Value Date    WBC 16.63 (H) 2025    HGB 8.2 (L) 2025    HCT 26.9 (L) 2025     2025       Discharge Medications     Discharge Medications        New Medications        Instructions Start Date   ibuprofen 200 MG tablet  Commonly known as: ADVIL,MOTRIN   400 mg, Oral, Every 4 Hours PRN             Continue These Medications        Instructions Start Date   acetaminophen 325 MG tablet  Commonly known as: TYLENOL   650 mg, Oral, Every 4 Hours PRN      famotidine 20 MG tablet  Commonly known as: Pepcid   20 mg, Oral, Daily      ferrous sulfate 325 (65 FE) MG tablet   325 mg, Oral, Daily With Breakfast      ondansetron ODT 4 MG disintegrating tablet  Commonly known as: ZOFRAN-ODT   4 mg, Translingual, Every 8 Hours PRN      polyethylene glycol 17 g packet  Commonly known as: MiraLax   17 g, Oral, Daily      prenatal (CLASSIC) vitamin 28-0.8 MG tablet tablet  Generic drug: prenatal vitamin   Daily      promethazine 25 MG tablet  Commonly known as: PHENERGAN   25 mg, Oral, Every 6 Hours PRN               External  Prenatal Results       Pregnancy Outside Results - Transcribed From Office Records - See Scanned Records For Details       Test Value Date Time    ABO  O  05/10/25 2055    Rh  Negative  05/10/25 2055    Antibody Screen  Positive  05/10/25 2055       Negative  03/13/25 1001       Negative  10/18/24 1317    Varicella IgG  Non Reactive  10/18/24 1317    Rubella  1.19 index 10/18/24 1317    Hgb  8.2 g/dL 05/12/25 0727       9.4 g/dL 05/10/25 2055       9.7 g/dL 03/13/25 1001       12.1 g/dL 11/20/24 1705       12.3 g/dL 10/28/24 2014       12.7 g/dL 10/18/24 1317    Hct  26.9 % 05/12/25 0727       30.0 % 05/10/25 2055       28.9 % 03/13/25 1001       34.9 % 11/20/24 1705       37.2 % 10/28/24 2014       38.8 % 10/18/24 1317    HgB A1c   5.4 % 10/18/24 1317    1h GTT  126 mg/dL 03/13/25 1001    3h GTT Fasting       3h GTT 1 hour       3h GTT 2 hour       3h GTT 3 hour        Gonorrhea (discrete)  Negative  05/08/25 1004       Negative  10/18/24 1317    Chlamydia (discrete)  Negative  05/08/25 1004       Negative  10/18/24 1317    RPR  Non Reactive  10/18/24 1317    Syphils cascade: TP-Ab (FTA)  Non-Reactive  05/10/25 2055       Non Reactive  03/13/25 1001    TP-Ab  Non-Reactive  05/10/25 2055       Non Reactive  03/13/25 1001    TP-Ab (EIA)       TPPA       HBsAg  Negative  03/13/25 1001       Negative  10/18/24 1317    Herpes Simplex Virus PCR       Herpes Simplex VIrus Culture       HIV  Non Reactive  03/13/25 1001       Non Reactive  10/18/24 1317    Hep C RNA Quant PCR       Hep C Antibody  Non Reactive  03/13/25 1001    AFP       NIPT       Cystic Fibrosis (Kita)       Cystic Fibroisis        Spinal Muscular atrophy       Fragile X       Group B Strep       GBS Susceptibility to Clindamycin       GBS Susceptibility to Erythromycin       Fetal Fibronectin       Genetic Testing, Maternal Blood                 Drug Screening       Test Value Date Time    Urine Drug Screen       Amphetamine Screen       Barbiturate  Screen       Benzodiazepine Screen       Methadone Screen       Phencyclidine Screen       Opiates Screen       THC Screen       Cocaine Screen       Propoxyphene Screen       Buprenorphine Screen       Methamphetamine Screen       Oxycodone Screen       Tricyclic Antidepressants Screen                 Legend    ^: Historical                            Discharge Disposition Home or Self Care   Condition on Discharge: Good   Follow-up: 6 weeks with Dr. Velasquez       Plan for discharge reviewed with Dr. Martinez.    This note has been signed electronically.    Nathalia Clark, KATY, APRN, CNM  5/13/2025

## 2025-05-13 NOTE — PROGRESS NOTES
"      KIANA Gonzalez  [facility] [place] [place] [facility]  [address]  [place] [address]  Office [phone]  Fax [phone]    [facility]  Vaginal Delivery Progress Note    Subjective   Postpartum Day 2: Vaginal Delivery    Feeling well and prepared for discharge today.  Taking Motrin and Tylenol for discomfort.  She is also using the comfort products such as the Xochitl bottle and ice packs.  Ambulating and voiding without difficulty.  Describes bleeding as light, thin lochia.  Declines breast-feeding.      Objective     Vital Signs Range for the last 24 hours  Temperature: Temp:  [98 °F (36.7 °C)-98.3 °F (36.8 °C)] 98.3 °F (36.8 °C)   Temp Source: Temp src: Oral   BP: BP: (111-124)/(69-72) 124/72   Pulse: Heart Rate:  [72-86] 72   Respirations: Resp:  [18-20] 18   SPO2: SpO2:  [97 %-98 %] 97 %   O2 Amount (l/min):     O2 Devices Device (Oxygen Therapy): room air   Weight:       Admit Height:  Height: 160 cm (63\")      Physical Exam:  General:  no acute distresss. Cooperative. Calm. Normal mood and affect.  Abdomen: soft, nontender and with firm fundus.   Extremities: calves nontender, movement without difficulty, no edema.     Lab results reviewed: Yes  Rubella:  No results found for: \"RUBELLAIGGIN\" Nurse Transcribed from prenatal record --  No components found for: \"EXTRUBELQUAL\"  Rh Status:    RH type   Date Value Ref Range Status   05/10/2025 Negative  Final     Immunizations:   Immunization History   Administered Date(s) Administered    DTaP 01/02/1997, 03/11/1997, 05/19/1997, 11/19/1997, 11/30/2000    DTaP, Unspecified 11/30/2000    FluMist 2-49yrs (Nasal) 09/10/2013, 01/05/2016    HPV Quadrivalent 09/10/2013, 09/16/2014, 01/05/2016    Hepatitis B Adult/Adolescent IM 1995, 1995, 05/19/1997    HiB 01/02/1997, 03/04/1997, 05/19/1997, 11/19/1997    IPV 01/02/1997, 03/04/1997, 05/19/1997, 11/19/1997, 11/30/2000    MCV4 Unspecified 09/10/2013, 01/05/2016    MMR 11/19/1997, 11/30/2000    Meningococcal " MCV4P (Menactra) 09/10/2013, 01/05/2016    Rho (D) Immune Globulin 09/28/2018, 11/29/2018, 08/13/2019, 11/13/2019, 10/07/2020, 01/30/2024, 04/18/2024, 03/13/2025    Tdap 02/07/2008, 11/29/2018, 01/30/2024, 03/13/2025    Varicella 11/30/2018     Lab Results (last 24 hours)       ** No results found for the last 24 hours. **            External Prenatal Results       Pregnancy Outside Results - Transcribed From Office Records - See Scanned Records For Details       Test Value Date Time    ABO  O  05/10/25 2055    Rh  Negative  05/10/25 2055    Antibody Screen  Positive  05/10/25 2055       Negative  03/13/25 1001       Negative  10/18/24 1317    Varicella IgG  Non Reactive  10/18/24 1317    Rubella  1.19 index 10/18/24 1317    Hgb  8.2 g/dL 05/12/25 0727       9.4 g/dL 05/10/25 2055       9.7 g/dL 03/13/25 1001       12.1 g/dL 11/20/24 1705       12.3 g/dL 10/28/24 2014       12.7 g/dL 10/18/24 1317    Hct  26.9 % 05/12/25 0727       30.0 % 05/10/25 2055       28.9 % 03/13/25 1001       34.9 % 11/20/24 1705       37.2 % 10/28/24 2014       38.8 % 10/18/24 1317    HgB A1c   5.4 % 10/18/24 1317    1h GTT  126 mg/dL 03/13/25 1001    3h GTT Fasting       3h GTT 1 hour       3h GTT 2 hour       3h GTT 3 hour        Gonorrhea (discrete)  Negative  05/08/25 1004       Negative  10/18/24 1317    Chlamydia (discrete)  Negative  05/08/25 1004       Negative  10/18/24 1317    RPR  Non Reactive  10/18/24 1317    Syphils cascade: TP-Ab (FTA)  Non-Reactive  05/10/25 2055       Non Reactive  03/13/25 1001    TP-Ab  Non-Reactive  05/10/25 2055       Non Reactive  03/13/25 1001    TP-Ab (EIA)       TPPA       HBsAg  Negative  03/13/25 1001       Negative  10/18/24 1317    Herpes Simplex Virus PCR       Herpes Simplex VIrus Culture       HIV  Non Reactive  03/13/25 1001       Non Reactive  10/18/24 1317    Hep C RNA Quant PCR       Hep C Antibody  Non Reactive  03/13/25 1001    AFP       NIPT       Cystic Fibrosis (Kita)       Cystic  Fibroisis        Spinal Muscular atrophy       Fragile X       Group B Strep       GBS Susceptibility to Clindamycin       GBS Susceptibility to Erythromycin       Fetal Fibronectin       Genetic Testing, Maternal Blood                 Drug Screening       Test Value Date Time    Urine Drug Screen       Amphetamine Screen       Barbiturate Screen       Benzodiazepine Screen       Methadone Screen       Phencyclidine Screen       Opiates Screen       THC Screen       Cocaine Screen       Propoxyphene Screen       Buprenorphine Screen       Methamphetamine Screen       Oxycodone Screen       Tricyclic Antidepressants Screen                 Legend    ^: Historical                            Assessment & Plan       Abdominal pain during pregnancy in third trimester    36 weeks gestation of pregnancy     contractions      Dianna Damon is Day 2  post-partum  Vaginal, Vacuum (Extractor)  .      Plan: Continue with current postpartum plan of care.  Discharge home with standard precautions.  Questions answered.  Follow-up in clinic in 6 weeks with Dr. Velasquez..    Plan for discharge reviewed with Dr. Martinez.    This note has been signed electronically.    Nathalia Clark DNP, APRN, CNM  2025  07:46 CDT

## 2025-05-14 ENCOUNTER — MATERNAL SCREENING (OUTPATIENT)
Dept: CALL CENTER | Facility: HOSPITAL | Age: 30
End: 2025-05-14
Payer: COMMERCIAL

## 2025-05-14 NOTE — OUTREACH NOTE
Maternal Screening Survey      Flowsheet Row Responses   Eligibility Eligible   Prep survey completed? Yes   Facility patient discharged from? [facility] - Registered Nurse                   Pt complaining of pain in between shoulder blades, pt also states his chest hurts. Since 1 am today.

## 2025-05-21 ENCOUNTER — TELEPHONE (OUTPATIENT)
Dept: OBSTETRICS AND GYNECOLOGY | Age: 30
End: 2025-05-21
Payer: COMMERCIAL

## 2025-05-21 NOTE — TELEPHONE ENCOUNTER
Pt calling to report she received letter stating due for f/u on CT for nodule surveillance last performed 6/2024. Spoke with Dr. Velasquez and he informs will discuss further imaging at next OV. Pt informed and advised she may also establish with PCP for monitoring of imaging and bring letter to OV scheduled 6/24/25 to discuss further imaging. Pt voices understanding.

## 2025-05-22 ENCOUNTER — MATERNAL SCREENING (OUTPATIENT)
Dept: CALL CENTER | Facility: HOSPITAL | Age: 30
End: 2025-05-22
Payer: COMMERCIAL

## 2025-05-22 NOTE — OUTREACH NOTE
Maternal Screening Survey      Flowsheet Row Responses   Facility patient discharged from? [facility]   Attempt successful? Yes   Call start time 1503   Call end time 1505   I have been able to laugh and see the funny side of things. 0   I have looked forward with enjoyment to things. 0   I have blamed myself unnecessarily when things went wrong. 0   I have been anxious or worried for no good reason. 0   I have felt scared or panicky for no good reason. 0   Things have been getting on top of me. 0   I have been so unhappy that I have had difficulty sleeping. 0   I have felt sad or miserable. 0   I have been so unhappy that I have been crying. 0   The thought of harming myself has occurred to me. 0   [place]  Depression Scale Total 0   Did any of your parents have problems with alcohol or drug use? No   Do any of your peers have problems with alcohol or drug use? No   Does your partner have problems with alcohol or drug use? No   Before you were pregnant did you have problems with alcohol or drug use? (past) No   In the past month, did you drink beer, wine, liquor or use any other drugs? (pregnancy) No   Maternal Screening call completed Yes   Call end time 1505              [facility] - Registered Nurse

## 2025-08-25 ENCOUNTER — TELEPHONE (OUTPATIENT)
Dept: OBSTETRICS AND GYNECOLOGY | Age: 30
End: 2025-08-25
Payer: COMMERCIAL